# Patient Record
Sex: MALE | Race: WHITE | Employment: OTHER | URBAN - METROPOLITAN AREA
[De-identification: names, ages, dates, MRNs, and addresses within clinical notes are randomized per-mention and may not be internally consistent; named-entity substitution may affect disease eponyms.]

---

## 2017-11-16 ENCOUNTER — CASE MANAGEMENT (OUTPATIENT)
Dept: EMERGENCY DEPT | Age: 62
End: 2017-11-16

## 2017-11-16 PROBLEM — R79.89 ELEVATED LFTS: Status: ACTIVE | Noted: 2017-11-16

## 2017-11-16 PROBLEM — K62.89 PROCTITIS: Status: ACTIVE | Noted: 2017-11-16

## 2017-11-16 PROBLEM — K62.89 RECTAL MASS: Status: ACTIVE | Noted: 2017-11-16

## 2017-11-16 PROBLEM — E87.5 HYPERKALEMIA: Status: ACTIVE | Noted: 2017-11-16

## 2017-12-11 ENCOUNTER — OFFICE VISIT (OUTPATIENT)
Dept: FAMILY MEDICINE CLINIC | Age: 62
End: 2017-12-11

## 2017-12-11 VITALS
BODY MASS INDEX: 21.77 KG/M2 | DIASTOLIC BLOOD PRESSURE: 78 MMHG | SYSTOLIC BLOOD PRESSURE: 136 MMHG | HEIGHT: 69 IN | OXYGEN SATURATION: 95 % | HEART RATE: 66 BPM | TEMPERATURE: 98.1 F | WEIGHT: 147 LBS

## 2017-12-11 DIAGNOSIS — R74.8 ELEVATED LIVER ENZYMES: Primary | ICD-10-CM

## 2017-12-11 DIAGNOSIS — F10.10 ALCOHOL ABUSE: ICD-10-CM

## 2017-12-11 LAB
A/G RATIO: 0.8 (ref 1.1–2.2)
ALBUMIN SERPL-MCNC: 4 G/DL (ref 3.4–5)
ALP BLD-CCNC: 98 U/L (ref 40–129)
ALT SERPL-CCNC: 51 U/L (ref 10–40)
ANION GAP SERPL CALCULATED.3IONS-SCNC: 19 MMOL/L (ref 3–16)
AST SERPL-CCNC: 119 U/L (ref 15–37)
BASOPHILS ABSOLUTE: 0 K/UL (ref 0–0.2)
BASOPHILS RELATIVE PERCENT: 1.2 %
BILIRUB SERPL-MCNC: 1.8 MG/DL (ref 0–1)
BUN BLDV-MCNC: 11 MG/DL (ref 7–20)
CALCIUM SERPL-MCNC: 9.7 MG/DL (ref 8.3–10.6)
CHLORIDE BLD-SCNC: 99 MMOL/L (ref 99–110)
CO2: 22 MMOL/L (ref 21–32)
CREAT SERPL-MCNC: 0.6 MG/DL (ref 0.8–1.3)
EOSINOPHILS ABSOLUTE: 0 K/UL (ref 0–0.6)
EOSINOPHILS RELATIVE PERCENT: 0.9 %
GFR AFRICAN AMERICAN: >60
GFR NON-AFRICAN AMERICAN: >60
GLOBULIN: 5.2 G/DL
GLUCOSE BLD-MCNC: 96 MG/DL (ref 70–99)
HCT VFR BLD CALC: 38.7 % (ref 40.5–52.5)
HEMOGLOBIN: 13 G/DL (ref 13.5–17.5)
LYMPHOCYTES ABSOLUTE: 0.8 K/UL (ref 1–5.1)
LYMPHOCYTES RELATIVE PERCENT: 24.2 %
MCH RBC QN AUTO: 35.5 PG (ref 26–34)
MCHC RBC AUTO-ENTMCNC: 33.7 G/DL (ref 31–36)
MCV RBC AUTO: 105.3 FL (ref 80–100)
MONOCYTES ABSOLUTE: 0.4 K/UL (ref 0–1.3)
MONOCYTES RELATIVE PERCENT: 13 %
NEUTROPHILS ABSOLUTE: 2 K/UL (ref 1.7–7.7)
NEUTROPHILS RELATIVE PERCENT: 60.7 %
PDW BLD-RTO: 13.4 % (ref 12.4–15.4)
PLATELET # BLD: 137 K/UL (ref 135–450)
PMV BLD AUTO: 9.4 FL (ref 5–10.5)
POTASSIUM SERPL-SCNC: 3.8 MMOL/L (ref 3.5–5.1)
RBC # BLD: 3.68 M/UL (ref 4.2–5.9)
SODIUM BLD-SCNC: 140 MMOL/L (ref 136–145)
TOTAL PROTEIN: 9.2 G/DL (ref 6.4–8.2)
WBC # BLD: 3.3 K/UL (ref 4–11)

## 2017-12-11 PROCEDURE — G8598 ASA/ANTIPLAT THER USED: HCPCS | Performed by: NURSE PRACTITIONER

## 2017-12-11 PROCEDURE — G8420 CALC BMI NORM PARAMETERS: HCPCS | Performed by: NURSE PRACTITIONER

## 2017-12-11 PROCEDURE — 4004F PT TOBACCO SCREEN RCVD TLK: CPT | Performed by: NURSE PRACTITIONER

## 2017-12-11 PROCEDURE — G8484 FLU IMMUNIZE NO ADMIN: HCPCS | Performed by: NURSE PRACTITIONER

## 2017-12-11 PROCEDURE — G8427 DOCREV CUR MEDS BY ELIG CLIN: HCPCS | Performed by: NURSE PRACTITIONER

## 2017-12-11 PROCEDURE — 1111F DSCHRG MED/CURRENT MED MERGE: CPT | Performed by: NURSE PRACTITIONER

## 2017-12-11 PROCEDURE — 99204 OFFICE O/P NEW MOD 45 MIN: CPT | Performed by: NURSE PRACTITIONER

## 2017-12-11 PROCEDURE — 3017F COLORECTAL CA SCREEN DOC REV: CPT | Performed by: NURSE PRACTITIONER

## 2017-12-11 PROCEDURE — 36415 COLL VENOUS BLD VENIPUNCTURE: CPT | Performed by: NURSE PRACTITIONER

## 2017-12-11 ASSESSMENT — ENCOUNTER SYMPTOMS
BLOOD IN STOOL: 0
SHORTNESS OF BREATH: 0
DIARRHEA: 0
NAUSEA: 0
CONSTIPATION: 0
VOMITING: 0
WHEEZING: 0

## 2017-12-11 NOTE — PROGRESS NOTES
Subjective:      Patient ID: Smita Castellanos is a 58 y.o. male. HPI pt is here to establish care. Pt said the last beer was Thursday. Pt is an acholics. Pt said he wants to stop drinking. Pt was recently hospitalized for alcoholic. 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 Pt will go part time in January. Review of Systems   Respiratory: Negative for shortness of breath and wheezing. Gastrointestinal: Negative for blood in stool, constipation, diarrhea, nausea and vomiting. All other systems reviewed and are negative. Objective:   Physical Exam   Constitutional: He is oriented to person, place, and time. He appears well-developed and well-nourished. Eyes: Conjunctivae are normal. Pupils are equal, round, and reactive to light. Cardiovascular: Normal rate, regular rhythm and normal heart sounds. No murmur heard. Pulmonary/Chest: Effort normal and breath sounds normal. He has no wheezes. He has no rales. Abdominal: Bowel sounds are normal. There is no tenderness. There is no rebound and no guarding. Neurological: He is alert and oriented to person, place, and time. Tremor a resting state. Continues when holding object. Slight head tremor as well. Skin: Skin is warm and dry. Psychiatric: He has a normal mood and affect. His behavior is normal. Judgment and thought content normal.   Vitals reviewed. Assessment:      1. Elevated liver enzymes  [facility] Grace Maciel MD    COMPREHENSIVE METABOLIC PANEL    CBC Auto Differential   2. Alcohol abuse  [address], MD    COMPREHENSIVE METABOLIC PANEL    CBC Auto Differential           Plan:      Alyssia Michaud was seen today for establish care. Diagnoses and all orders for this visit:    Elevated liver enzymes - will check patient's CBC along with liver enzymes today.  Patient will make appointment with Dr. Magno Phelps MD  - COMPREHENSIVE METABOLIC PANEL  -     CBC Auto Differential    Alcohol abuse -had a long talk today with patient about not drinking. Patient educated about liver results from ultrasound. Patient will see Dr. Corry Travis for evaluation. 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 Talk to patient about AA as well as outpatient or inpatient rehabilitation. Patient is not interested at this time. -     Ul. Lindsay Bradley MD  -     COMPREHENSIVE METABOLIC PANEL  -     CBC Auto Differential          patient will follow-up in one month. Suspect patient will do better on some kind of antianxiety medication. Is almost patient is drinking will not refill Librium.

## 2017-12-13 ENCOUNTER — TELEPHONE (OUTPATIENT)
Dept: FAMILY MEDICINE CLINIC | Age: 62
End: 2017-12-13

## 2017-12-13 RX ORDER — CHLORDIAZEPOXIDE HYDROCHLORIDE 25 MG/1
25 CAPSULE, GELATIN COATED ORAL 3 TIMES DAILY PRN
Qty: 30 CAPSULE | Refills: 1 | OUTPATIENT
Start: 2017-12-13

## 2017-12-13 NOTE — TELEPHONE ENCOUNTER
Pt called back requesting refill on librium. States this was given to him at the hospital and he thought the refill was being sent the day of his appt, but was not.

## 2017-12-13 NOTE — TELEPHONE ENCOUNTER
Pt is calling back to talk to Benjy. Pt said she called him. Pt is available at [phone] today. Pt was here on 12/11/17 for ov.

## 2017-12-14 RX ORDER — BUSPIRONE HYDROCHLORIDE 7.5 MG/1
7.5 TABLET ORAL 2 TIMES DAILY
Qty: 60 TABLET | Refills: 0 | Status: SHIPPED | OUTPATIENT
Start: 2017-12-14 | End: 2021-04-16 | Stop reason: ALTCHOICE

## 2017-12-14 NOTE — TELEPHONE ENCOUNTER
Patient called back about his prescription, advised him per Catee it was denied because he is still drinking and it is lethal when is drinking. He stated \"no I haven't had a drink since Monday and I'm going to quit drinking. \" Asking for Catee or someone call him back.

## 2017-12-15 ENCOUNTER — TELEPHONE (OUTPATIENT)
Dept: FAMILY MEDICINE CLINIC | Age: 62
End: 2017-12-15

## 2017-12-27 ENCOUNTER — INITIAL CONSULT (OUTPATIENT)
Dept: GASTROENTEROLOGY | Age: 62
End: 2017-12-27

## 2017-12-27 VITALS
HEIGHT: 69 IN | SYSTOLIC BLOOD PRESSURE: 126 MMHG | BODY MASS INDEX: 21.92 KG/M2 | DIASTOLIC BLOOD PRESSURE: 80 MMHG | WEIGHT: 148 LBS

## 2017-12-27 DIAGNOSIS — R63.4 WEIGHT LOSS: ICD-10-CM

## 2017-12-27 DIAGNOSIS — D69.6 THROMBOCYTOPENIA (HCC): ICD-10-CM

## 2017-12-27 DIAGNOSIS — R79.89 ELEVATED LFTS: ICD-10-CM

## 2017-12-27 DIAGNOSIS — D62 ACUTE BLOOD LOSS ANEMIA: ICD-10-CM

## 2017-12-27 DIAGNOSIS — F10.10 ALCOHOL ABUSE: Primary | ICD-10-CM

## 2017-12-27 PROCEDURE — 99204 OFFICE O/P NEW MOD 45 MIN: CPT | Performed by: INTERNAL MEDICINE

## 2017-12-27 NOTE — PROGRESS NOTES
[address]  [facility]  Phone: [phone]    CHIEF COMPLAINT     Chief Complaint   Patient presents with   [facility].     NP- elevated LFT's, referred by Dr. Soumya NGO     Thank you Soheila Tripp CNP for asking me to see Yola Garcia in consultation. He is a  [2] White [1] 58 y.o. . male seen independently who presents with the following GI complaints:  . Puneet Rivera  Has a new diagnosis of alcoholic cirrhosis by Ct. He is noted to be slightly anemic. CT showed rectal thickening and colonoscopy with CGI showed some polyps. He has lost 15lbs or so in the past month. Annamarie Loge has been started in this same time and he has cut down on his drinking some. He drank most of his life. His father made moonshine. He talks about wanting to start Chantix in 6 months. There is a mild thrombocytopenia. CHADD, F actin, A1AT, iron studies noted. AST>ALT c/w alcoholic liver disease. Alcohol level tony 6 weeks ago. Last Encounter Reviewed:   Pertinent PMH, FH, SH is reviewed below.   Last EGD: none  Last Colonoscopy: 12/2017 polyps, NA    No components found for: HGBA1C  BP Readings from Last 3 Encounters:   12/27/17 126/80   12/11/17 136/78   11/17/17 (!) 153/92     Health Maintenance   Topic Date Due    HIV screen  04/30/1970    DTaP/Tdap/Td vaccine (1 - Tdap) 04/30/1974    Pneumococcal med risk (1 of 1 - PPSV23) 04/30/1974    Smoker: low dose lung CT screening  04/30/2010    Zostavax vaccine  04/30/2015    Flu vaccine (1) 09/01/2017    Colon Cancer Screen FIT/FOBT  11/16/2018    Lipid screen  04/05/2021    Hepatitis C screen  Completed       No components found for: [facility]     PAST MEDICAL HISTORY     Past Medical History:   Diagnosis Date    Hepatitis C antibody positive in blood 11/16/2017    MI (myocardial infarction)      FAMILY HISTORY     Family History   Problem Relation Age of Onset    Other Mother      SOCIAL HISTORY     Social History     Social History    Marital status:      Spouse name: N/A    Number of children: N/A    Years of education: N/A     Occupational History    Not on file. Social History Main Topics    Smoking status: Current Every Day Smoker     Packs/day: 1.00    Smokeless tobacco: Never Used    Alcohol use 3.0 oz/week     5 Cans of beer per week    Drug use: No    Sexual activity: Not on file     Other Topics Concern    Not on file     Social History Narrative    No narrative on file     SURGICAL HISTORY     Past Surgical History:   Procedure Laterality Date    CARDIAC CATHETERIZATION      COLONOSCOPY  11/17/2017    Initial Colonoscopy; Multiple Colon Polyps     CURRENT MEDICATIONS   (This list may include medications prescribed during this encounter as epic can not insert only the list prior to this encounter.)  Current Outpatient Rx   Medication Sig Dispense Refill    aspirin 81 MG tablet Take 81 mg by mouth daily      busPIRone (BUSPAR) 7.5 MG tablet Take 1 tablet by mouth 2 times daily 60 tablet 0    folic acid (FOLVITE) 1 MG tablet Take 1 tablet by mouth daily 30 tablet 3    vitamin B-1 100 MG tablet Take 1 tablet by mouth daily 30 tablet 0    famotidine (PEPCID) 20 MG tablet Take 1 tablet by mouth 2 times daily 60 tablet 0    Multiple Vitamins-Minerals (THERAPEUTIC MULTIVITAMIN-MINERALS) tablet Take 1 tablet by mouth daily 30 tablet 0     ALLERGIES     Allergies   Allergen Reactions    Codeine Hives     IMMUNIZATIONS     There is no immunization history on file for this patient. REVIEW OF SYSTEMS   See HPI for further details and pertinent postiives. Negative for the following:  Constitutional: Negative for weight change. Negative for appetite change and fatigue. HENT: Negative for nosebleeds, sore throat, mouth sores, and voice change. Respiratory: Negative for cough, choking and chest tightness.    Cardiovascular: Negative for chest 12/27/2018    Protime-INR     Standing Status:   Future     Standing Expiration Date:   12/27/2018     Order Specific Question:   Daily Coumadin Dose? Answer:   none    NV ESOPHAGOGASTRODUODENOSCOPY TRANSORAL DIAGNOSTIC     With diprivan     Puneet was seen today for establish care. Diagnoses and all orders for this visit:    Alcohol abuse  -     US Gallbladder Ruq; Future  -     Protime-INR; Future    Thrombocytopenia ([address]  -     US Gallbladder Ruq; Future  -     Protime-INR; Future    Elevated LFTs  -     AFP TUMOR MARKER; Future  -     US Gallbladder Ruq; Future  -     IgA; Future  -     Tissue Transglutaminase, IgA; Future  -     Protime-INR; Future    Acute blood loss anemia  -     NV ESOPHAGOGASTRODUODENOSCOPY TRANSORAL DIAGNOSTIC    Weight loss        ORDERED FUTURE/PENDING TESTS       FOLLOWUP   Return for EGD.           David LE 12/27/17 2:33 PM    CC:  Gaurang Washington, CNP

## 2017-12-29 ENCOUNTER — TELEPHONE (OUTPATIENT)
Dept: GASTROENTEROLOGY | Age: 62
End: 2017-12-29

## 2018-01-09 ENCOUNTER — TELEPHONE (OUTPATIENT)
Dept: GASTROENTEROLOGY | Age: 63
End: 2018-01-09

## 2018-01-09 NOTE — LETTER
[address] [facility]  [address] [phone]  f [phone]    Salomon Pena MD                        [facility] 18    2:14 PM    Facility:   [facility] ENDO                                                            Procedure Date & Time: [email or web address]       Pt arrival: 9:00AM                                                                                      Patient Name:  Fiona Hudson     :  1955 PCP:  Guillermina Soto CNP       Home Ph:    [phone] (home)           SSN:                                         PROCEDURE:  EGD                                                      [id number]    DIAGNOSIS:  Anemia                      D62    Anesthesia: _none_  Time Needed: 20 minutes  Pt Position:  lateral, right side up         Outpatient _X_                                    _x_PREP:  NPO 8 hours prior                           _____Cardiac Clearance by; ___________     Medications to be stopped 5 days before procedure: _________  Additional / Special Orders:                                                                                                                                                                                                                                 Puneet Rivera    1955                                                    Endoscopy Order   IN ACCORDANCE WITH OUR FORMULARY SYSTEM, A GENERIC EQUIVALENT DRUG MAY BE DISPNSED AND ADMINISTERED UNLESS D. A. W. IS WRITTEN WITH THE MEDICATION ORDER.   DATE HOUR PHYSICIAN:  RECORD DATE, HOUR AND SIGN EACH ENTRY   18 10:00Am 1)  Admit for:   Colonoscopy  EGD     Anesthesia/MAC        ERCP     Upper EUS     Lower EUS                             2)  Diagnosis: D62     3)  Establish IV access     Solution:  0.9 Normal Saline   Lactated Ringers    Other:                            Rate:   Pamela Abreu      Other:

## 2018-01-17 ENCOUNTER — TELEPHONE (OUTPATIENT)
Dept: GASTROENTEROLOGY | Age: 63
End: 2018-01-17

## 2018-01-18 ENCOUNTER — HOSPITAL ENCOUNTER (OUTPATIENT)
Dept: ULTRASOUND IMAGING | Age: 63
Discharge: HOME | End: 2018-01-18
Attending: INTERNAL MEDICINE | Admitting: INTERNAL MEDICINE

## 2018-01-18 ENCOUNTER — HOSPITAL ENCOUNTER (OUTPATIENT)
Dept: SURGERY | Age: 63
Discharge: HOME | End: 2018-01-18
Attending: INTERNAL MEDICINE | Admitting: INTERNAL MEDICINE

## 2018-01-18 VITALS
OXYGEN SATURATION: 96 % | HEART RATE: 65 BPM | DIASTOLIC BLOOD PRESSURE: 78 MMHG | RESPIRATION RATE: 18 BRPM | SYSTOLIC BLOOD PRESSURE: 115 MMHG | TEMPERATURE: 98.5 F

## 2018-01-18 DIAGNOSIS — D69.6 THROMBOCYTOPENIA (HCC): ICD-10-CM

## 2018-01-18 DIAGNOSIS — F10.10 UNCOMPLICATED ALCOHOL ABUSE: ICD-10-CM

## 2018-01-18 DIAGNOSIS — R79.89 ELEVATED LFTS: ICD-10-CM

## 2018-01-18 DIAGNOSIS — F10.10 ALCOHOL ABUSE: ICD-10-CM

## 2018-01-18 LAB
IGA: 273 MG/DL (ref 70–400)
INR BLD: 1.16 (ref 0.85–1.15)
PROTHROMBIN TIME: 13.1 SEC (ref 9.6–13)

## 2018-01-18 RX ORDER — HYDROMORPHONE HCL 110MG/55ML
0.5 PATIENT CONTROLLED ANALGESIA SYRINGE INTRAVENOUS EVERY 5 MIN PRN
Status: DISCONTINUED | OUTPATIENT
Start: 2018-01-18 | End: 2018-01-19 | Stop reason: HOSPADM

## 2018-01-18 RX ORDER — HYDROMORPHONE HCL 110MG/55ML
0.25 PATIENT CONTROLLED ANALGESIA SYRINGE INTRAVENOUS EVERY 5 MIN PRN
Status: DISCONTINUED | OUTPATIENT
Start: 2018-01-18 | End: 2018-01-19 | Stop reason: HOSPADM

## 2018-01-18 RX ORDER — SODIUM CHLORIDE, SODIUM LACTATE, POTASSIUM CHLORIDE, CALCIUM CHLORIDE 600; 310; 30; 20 MG/100ML; MG/100ML; MG/100ML; MG/100ML
INJECTION, SOLUTION INTRAVENOUS ONCE
Status: COMPLETED | OUTPATIENT
Start: 2018-01-18 | End: 2018-01-18

## 2018-01-18 RX ORDER — ONDANSETRON 2 MG/ML
4 INJECTION INTRAMUSCULAR; INTRAVENOUS PRN
Status: DISCONTINUED | OUTPATIENT
Start: 2018-01-18 | End: 2018-01-19 | Stop reason: HOSPADM

## 2018-01-18 RX ORDER — OXYCODONE HYDROCHLORIDE AND ACETAMINOPHEN 5; 325 MG/1; MG/1
2 TABLET ORAL PRN
Status: ACTIVE | OUTPATIENT
Start: 2018-01-18 | End: 2018-01-18

## 2018-01-18 RX ORDER — LABETALOL HYDROCHLORIDE 5 MG/ML
5 INJECTION, SOLUTION INTRAVENOUS EVERY 10 MIN PRN
Status: DISCONTINUED | OUTPATIENT
Start: 2018-01-18 | End: 2018-01-19 | Stop reason: HOSPADM

## 2018-01-18 RX ORDER — MORPHINE SULFATE 4 MG/ML
1 INJECTION, SOLUTION INTRAMUSCULAR; INTRAVENOUS EVERY 5 MIN PRN
Status: DISCONTINUED | OUTPATIENT
Start: 2018-01-18 | End: 2018-01-19 | Stop reason: HOSPADM

## 2018-01-18 RX ORDER — DIPHENHYDRAMINE HYDROCHLORIDE 50 MG/ML
12.5 INJECTION INTRAMUSCULAR; INTRAVENOUS
Status: ACTIVE | OUTPATIENT
Start: 2018-01-18 | End: 2018-01-18

## 2018-01-18 RX ORDER — OXYCODONE HYDROCHLORIDE AND ACETAMINOPHEN 5; 325 MG/1; MG/1
1 TABLET ORAL PRN
Status: ACTIVE | OUTPATIENT
Start: 2018-01-18 | End: 2018-01-18

## 2018-01-18 RX ORDER — MORPHINE SULFATE 10 MG/ML
2 INJECTION, SOLUTION INTRAMUSCULAR; INTRAVENOUS EVERY 5 MIN PRN
Status: DISCONTINUED | OUTPATIENT
Start: 2018-01-18 | End: 2018-01-19 | Stop reason: HOSPADM

## 2018-01-18 RX ORDER — PROMETHAZINE HYDROCHLORIDE 25 MG/ML
6.25 INJECTION, SOLUTION INTRAMUSCULAR; INTRAVENOUS
Status: DISCONTINUED | OUTPATIENT
Start: 2018-01-18 | End: 2018-01-19 | Stop reason: HOSPADM

## 2018-01-18 RX ORDER — MEPERIDINE HYDROCHLORIDE 25 MG/ML
12.5 INJECTION INTRAMUSCULAR; INTRAVENOUS; SUBCUTANEOUS EVERY 5 MIN PRN
Status: DISCONTINUED | OUTPATIENT
Start: 2018-01-18 | End: 2018-01-19 | Stop reason: HOSPADM

## 2018-01-18 RX ORDER — HYDRALAZINE HYDROCHLORIDE 20 MG/ML
5 INJECTION INTRAMUSCULAR; INTRAVENOUS EVERY 10 MIN PRN
Status: DISCONTINUED | OUTPATIENT
Start: 2018-01-18 | End: 2018-01-19 | Stop reason: HOSPADM

## 2018-01-18 RX ADMIN — SODIUM CHLORIDE, SODIUM LACTATE, POTASSIUM CHLORIDE, CALCIUM CHLORIDE: 600; 310; 30; 20 INJECTION, SOLUTION INTRAVENOUS at 10:11

## 2018-01-18 NOTE — ANESTHESIA PRE-OP
Department of Anesthesiology  Preprocedure Note       Name:  Gonzalo Kat   Age:  58 y.o.  :  1955                                          MRN:  [id number]         Date:  2018      Surgeon:    Procedure:    Medications prior to admission:   Prior to Admission medications    Medication Sig Start Date End Date Taking? Authorizing Provider   aspirin 81 MG tablet Take 81 mg by mouth daily    Historical Provider, MD   busPIRone (BUSPAR) 7.5 MG tablet Take 1 tablet by mouth 2 times daily 17   Bunny Seat, CNP   folic acid (FOLVITE) 1 MG tablet Take 1 tablet by mouth daily 17   Whit Betancourt MD   vitamin B-1 100 MG tablet Take 1 tablet by mouth daily 17   Whit Betancourt MD   famotidine (PEPCID) 20 MG tablet Take 1 tablet by mouth 2 times daily 17   Whit Betancourt MD   Multiple Vitamins-Minerals (THERAPEUTIC MULTIVITAMIN-MINERALS) tablet Take 1 tablet by mouth daily 17  Whit Betancourt MD       Current medications:    Current Outpatient Prescriptions   Medication Sig Dispense Refill    aspirin 81 MG tablet Take 81 mg by mouth daily      busPIRone (BUSPAR) 7.5 MG tablet Take 1 tablet by mouth 2 times daily 60 tablet 0    folic acid (FOLVITE) 1 MG tablet Take 1 tablet by mouth daily 30 tablet 3    vitamin B-1 100 MG tablet Take 1 tablet by mouth daily 30 tablet 0    famotidine (PEPCID) 20 MG tablet Take 1 tablet by mouth 2 times daily 60 tablet 0    Multiple Vitamins-Minerals (THERAPEUTIC MULTIVITAMIN-MINERALS) tablet Take 1 tablet by mouth daily 30 tablet 0     No current facility-administered medications for this encounter. Allergies:     Allergies   Allergen Reactions    Codeine Hives       Problem List:    Patient Active Problem List   Diagnosis Code    Open wound of finger with complication G88.363O    Chronic dermatitis L30.9    Chest pain R07.9    Unstable angina (HCC) I20.0    Angina pectoris (HCC) I20.9    Alcohol abuse F10.10    Negative Endo/Other ROS                    Abdominal:           Vascular:                                      Anesthesia Plan      general     ASA 3     (I discussed with the patient the risks and benefits of PIV, general anesthesia, IV Narcotics, PACU. All questions were answered the patient agrees with the plan.)  Induction: intravenous. Cath 4/5/2016  LM/LAD/Cx: <20%  RCA 20-30%  LVEF 55-60%    Pre-Operative Diagnosis: ANEMIA    58 y.o.   BMI:  There is no height or weight on file to calculate BMI. Vitals:    01/18/18 0937   BP: (!) 156/99   Pulse: 73   Resp: 18   Temp: 98.5 °F (36.9 °C)   TempSrc: Oral   SpO2: 98%       Allergies   Allergen Reactions    Codeine Hives       Social History   Substance Use Topics    Smoking status: Current Every Day Smoker     Packs/day: 1.00     Years: 45.00    Smokeless tobacco: Never Used      Comment: refused, is seeing primary physician.      Alcohol use 3.0 oz/week     5 Cans of beer per week      Comment: 2 beers a day       LABS:    CBC  Lab Results   Component Value Date/Time    WBC 3.3 (L) 12/11/2017 11:54 AM    HGB 13.0 (L) 12/11/2017 11:54 AM    HCT 38.7 (L) 12/11/2017 11:54 AM     12/11/2017 11:54 AM     RENAL  Lab Results   Component Value Date/Time     12/11/2017 11:54 AM    K 3.8 12/11/2017 11:54 AM    CL 99 12/11/2017 11:54 AM    CO2 22 12/11/2017 11:54 AM    BUN 11 12/11/2017 11:54 AM    CREATININE 0.6 (L) 12/11/2017 11:54 AM    GLUCOSE 96 12/11/2017 11:54 AM     COAGS  Lab Results   Component Value Date/Time    PROTIME 13.1 (H) 01/18/2018 08:36 AM    INR 1.16 (H) 01/18/2018 08:36 AM    APTT 32.8 04/05/2016 10:30 AM       Katelin Ken MD   1/18/2018

## 2018-01-18 NOTE — PROGRESS NOTES
Discharge        2.  Weight & Height Noted ( PRN)        3.   Assess Baseline SPo2 (PRN)         4.  Monitor SAO2 Continuously During Sedation/ Analgesia & Until Patient Meets D/C Criteria.        5.  Resuscitation Equipment Available (PRN)    GASTROINTESTINAL  INTERDISCIPLINARY    Goal: Bowel Sounds Maintained   Interventions        1.  Evaluate Baseline Bowel Sounds, (PRN), & Prior to [address]       2.  Monitor Abdominal status of Patient Throughout Procedure       KNOWLEDGE DEFICIT, EDUCATION, DISCHARGE PLAN    INTERDISCIPLINARY    Patient / SO verbalize Understanding Of Procedural discharge Instructions  Interventions        1.   Obtain Informed Consent (PRN)         2.   Initiate ENDO Plan of Care, Answer Questions (PRN)         3.  Assess Patients Ability to Understand Information (PRN)        3.   Discharge Planning ; Assure Presence of  upon Patient [address]       4.   Education / Communication Ongoing As Appropriate        5.  Keep Families Aware of Delays As Appropriate        6.  Reinforce Discharge Teaching / Post Procedure Instructions (PRN)    PAIN MANAGEMENT  INTERDISCIPLINARY    Goal:Patient Return to Pre Procedure Comfort  Interventions        1.   Assess Baseline Pain Level (PRN)        2.   Intra Procedure ; Evaluation & Assessment Of Pain is Ongoing        3.  Post procedure; Assess Pain Level Once Awake/ Prior To Discharge        2.   Administer Analgesics as Ordered (PRN)         3.   Assess Effectiveness of Pain Management (PRN)           Re-Assess Patient after all Interventions.  Assess Pain Level 30 - 60 Minutes After Pain Management Intervention.        4.  Provide Discharge Teaching

## 2018-01-19 LAB — TISSUE TRANSGLUTAMINASE IGA: 1 U/ML (ref 0–3)

## 2018-01-20 LAB — AFP: 3.6 UG/L

## 2018-07-09 PROBLEM — R53.1 RIGHT SIDED WEAKNESS: Status: ACTIVE | Noted: 2018-07-09

## 2018-07-10 ENCOUNTER — TELEPHONE (OUTPATIENT)
Dept: ORTHOPEDIC SURGERY | Age: 63
End: 2018-07-10

## 2018-07-16 ENCOUNTER — OFFICE VISIT (OUTPATIENT)
Dept: ORTHOPEDIC SURGERY | Age: 63
End: 2018-07-16

## 2018-07-16 VITALS
SYSTOLIC BLOOD PRESSURE: 132 MMHG | HEART RATE: 66 BPM | DIASTOLIC BLOOD PRESSURE: 76 MMHG | BODY MASS INDEX: 22.96 KG/M2 | WEIGHT: 155 LBS | HEIGHT: 69 IN

## 2018-07-16 DIAGNOSIS — M50.00 HNP (HERNIATED NUCLEUS PULPOSUS) WITH MYELOPATHY, CERVICAL: Primary | ICD-10-CM

## 2018-07-16 PROCEDURE — 1111F DSCHRG MED/CURRENT MED MERGE: CPT | Performed by: PHYSICIAN ASSISTANT

## 2018-07-16 PROCEDURE — 99213 OFFICE O/P EST LOW 20 MIN: CPT | Performed by: PHYSICIAN ASSISTANT

## 2018-07-16 PROCEDURE — G8427 DOCREV CUR MEDS BY ELIG CLIN: HCPCS | Performed by: PHYSICIAN ASSISTANT

## 2018-07-16 PROCEDURE — 3017F COLORECTAL CA SCREEN DOC REV: CPT | Performed by: PHYSICIAN ASSISTANT

## 2018-07-16 PROCEDURE — G8598 ASA/ANTIPLAT THER USED: HCPCS | Performed by: PHYSICIAN ASSISTANT

## 2018-07-16 PROCEDURE — G8420 CALC BMI NORM PARAMETERS: HCPCS | Performed by: PHYSICIAN ASSISTANT

## 2018-07-16 PROCEDURE — 4004F PT TOBACCO SCREEN RCVD TLK: CPT | Performed by: PHYSICIAN ASSISTANT

## 2018-07-16 RX ORDER — TRAMADOL HYDROCHLORIDE 50 MG/1
50 TABLET ORAL EVERY 8 HOURS PRN
Qty: 30 TABLET | Refills: 0 | Status: SHIPPED | OUTPATIENT
Start: 2018-07-16 | End: 2018-07-26

## 2018-07-24 ENCOUNTER — OFFICE VISIT (OUTPATIENT)
Dept: ORTHOPEDIC SURGERY | Age: 63
End: 2018-07-24

## 2018-07-24 VITALS
BODY MASS INDEX: 30.43 KG/M2 | WEIGHT: 155 LBS | DIASTOLIC BLOOD PRESSURE: 58 MMHG | HEIGHT: 60 IN | HEART RATE: 89 BPM | SYSTOLIC BLOOD PRESSURE: 108 MMHG

## 2018-07-24 DIAGNOSIS — M50.00 HNP (HERNIATED NUCLEUS PULPOSUS) WITH MYELOPATHY, CERVICAL: Primary | ICD-10-CM

## 2018-07-24 DIAGNOSIS — M50.30 DDD (DEGENERATIVE DISC DISEASE), CERVICAL: ICD-10-CM

## 2018-07-24 DIAGNOSIS — M54.12 CERVICAL RADICULITIS: ICD-10-CM

## 2018-07-24 PROCEDURE — 1111F DSCHRG MED/CURRENT MED MERGE: CPT | Performed by: PHYSICIAN ASSISTANT

## 2018-07-24 PROCEDURE — G8417 CALC BMI ABV UP PARAM F/U: HCPCS | Performed by: PHYSICIAN ASSISTANT

## 2018-07-24 PROCEDURE — G8427 DOCREV CUR MEDS BY ELIG CLIN: HCPCS | Performed by: PHYSICIAN ASSISTANT

## 2018-07-24 PROCEDURE — 99213 OFFICE O/P EST LOW 20 MIN: CPT | Performed by: PHYSICIAN ASSISTANT

## 2018-07-24 PROCEDURE — 4004F PT TOBACCO SCREEN RCVD TLK: CPT | Performed by: PHYSICIAN ASSISTANT

## 2018-07-24 PROCEDURE — G8598 ASA/ANTIPLAT THER USED: HCPCS | Performed by: PHYSICIAN ASSISTANT

## 2018-07-24 PROCEDURE — 3017F COLORECTAL CA SCREEN DOC REV: CPT | Performed by: PHYSICIAN ASSISTANT

## 2018-07-24 RX ORDER — GABAPENTIN 100 MG/1
CAPSULE ORAL
Qty: 60 CAPSULE | Refills: 0 | Status: SHIPPED | OUTPATIENT
Start: 2018-07-24 | End: 2021-04-16 | Stop reason: ALTCHOICE

## 2018-07-24 NOTE — LETTER
10. **PLEASE BRING A LIST OF YOUR CURRENT MEDICATIONS TO YOUR PROCEDURE**    Insurance Information:    Our office will contact your insurance company to complete any prior authorization notification that    needs to be completed prior to your procedure. Please make sure we are notified of any insurance changes prior to your procedure. If you have any questions, please feel free to contact me at [phone]. [phone]  Thank you,     Steve Prakash LPN   to Dr. Kira Last    **[address] WITH [address] AT [address] ON [address] . [facility]  [address]  [address] [facility], [place]ARU[address]Leanne Meol [address]  [phone] [address]                     ________________________________________________________________________________________      [address] [place]      1. Admit to preop. 2. Start IV 1000 ml LR at [place] or _____ml/hr for planned conscious sedation     3. May inject 1 % Lidocaine 0.1 ml Intradermal to numb IV site     4. Protime/INR if patient is on Coumadin     5. Urine Pregnancy Test (females only) - 12 -50 years     6. Accu Check Glucose if diabetic. Notify physician if <80 or >250.      7. Sedate all neurotomies        ________________________________________________________________________________________      POST-OPERATIVE ORDERS - DR. MOURA      1. Admit to Post Op Phase 2     2. Implement Standards of Care for Phase 2 Post Op     3. Check Site - May discharge when site is free of bleeding     4. Discharge to home after meets Phase 2 criteria     5. Discharge cervical patients after 30 minutes and when meets Phase 2 criteria. 6. Give discharge instruction sheet     7.  For Diabetic patient, if blood sugar less than 80 in preop, Recheck blood sugar in Post Op. 8. Discontinue IV     9.  For Nausea may give Zofran 4 MG IV/IM/ODT                 ________________________________________________________________________________________        7/24/18 2:38 PM

## 2018-07-24 NOTE — PROGRESS NOTES
New Patient: SPINE    CHIEF COMPLAINT:    Chief Complaint   Patient presents with    Neck Pain       HISTORY OF PRESENT ILLNESS:                The patient is a 61 y.o. male history of CAD, elevated LFTs, alcohol abuse per Epic referred by Dr. Xiang Frey for [facility]. He reports a 3 week history of atraumatic constant aching right-sided neck/trap and scapular pain. At times symptoms radiate to the right deltoid. His neck pain is most bothersome. He is unable to describe any true exacerbating or palliative factors. At times reports right arm weakness. He was hospitalized in July and followed up with Dr. Emma Elizondo office whom has recommended KATHERINE. Other conservative management includes NSAIDs, tramadol, baclofen, MDP. No improvement at this time. He denies any distal radiating pain, no progressive numbness tingling or progressive weakness. No fine motor difficulty or gait instability. He states the pain medication he has been receiving is \"nothing but a piece of shit\"       Pain Assessment  Location of Pain: Neck  Severity of Pain: 10  Quality of Pain: Aching, Dull, Sharp  Duration of Pain: Persistent  Frequency of Pain: Constant  Aggravating Factors: Stairs, Walking, Standing, Squatting, Kneeling, Exercise, Straightening, Stretching, Bending  Limiting Behavior: Yes  Relieving Factors: Rest  Result of Injury: No  Work-Related Injury: No  Are there other pain locations you wish to document?: No    The pain assessment was noted & reviewed in the medical record today.      Current/Past Treatment:   · Physical Therapy: YES in-patient at [facility] [place]  · Chiropractic: no  · Injection: no  · Medications:  NSAIDs, MDP, baclofen, tramadol    · Surgery/Consult: Dr. Chacon Speaker rec: Geovanna Nunez    Past Medical History: Medical history form was reviewed today & scanned into the media tab  Past Medical History:   Diagnosis Date    Hepatitis C antibody positive in blood 11/16/2017    MI (myocardial infarction)       Past Surgical History:

## 2018-07-24 NOTE — LETTER
  folic acid (FOLVITE) 1 MG tablet, Take 1 tablet by mouth daily, Disp: 30 tablet, Rfl: 3    vitamin B-1 100 MG tablet, Take 1 tablet by mouth daily, Disp: 30 tablet, Rfl: 0    famotidine (PEPCID) 20 MG tablet, Take 1 tablet by mouth 2 times daily, Disp: 60 tablet, Rfl: 0    Multiple Vitamins-Minerals (THERAPEUTIC MULTIVITAMIN-MINERALS) tablet, Take 1 tablet by mouth daily, Disp: 30 tablet, Rfl: 0

## 2018-07-30 ENCOUNTER — TELEPHONE (OUTPATIENT)
Dept: ORTHOPEDIC SURGERY | Age: 63
End: 2018-07-30

## 2018-08-01 ENCOUNTER — TELEPHONE (OUTPATIENT)
Dept: ORTHOPEDIC SURGERY | Age: 63
End: 2018-08-01

## 2018-08-02 ENCOUNTER — TELEPHONE (OUTPATIENT)
Dept: ORTHOPEDIC SURGERY | Age: 63
End: 2018-08-02

## 2018-08-07 ENCOUNTER — TELEPHONE (OUTPATIENT)
Dept: ORTHOPEDIC SURGERY | Age: 63
End: 2018-08-07

## 2018-08-08 ENCOUNTER — TELEPHONE (OUTPATIENT)
Dept: ORTHOPEDIC SURGERY | Age: 63
End: 2018-08-08

## 2018-08-13 ENCOUNTER — HOSPITAL ENCOUNTER (OUTPATIENT)
Age: 63
Setting detail: OUTPATIENT SURGERY
Discharge: HOME | End: 2018-08-13
Attending: PHYSICAL MEDICINE & REHABILITATION | Admitting: PHYSICAL MEDICINE & REHABILITATION
Payer: COMMERCIAL

## 2018-08-13 VITALS
TEMPERATURE: 97.8 F | SYSTOLIC BLOOD PRESSURE: 145 MMHG | DIASTOLIC BLOOD PRESSURE: 91 MMHG | HEART RATE: 69 BPM | RESPIRATION RATE: 16 BRPM | OXYGEN SATURATION: 97 %

## 2018-08-13 PROCEDURE — 2500000003 HC RX 250 WO HCPCS: Performed by: PHYSICAL MEDICINE & REHABILITATION

## 2018-08-13 PROCEDURE — 7100000010 HC PHASE II RECOVERY - FIRST 15 MIN: Performed by: PHYSICAL MEDICINE & REHABILITATION

## 2018-08-13 PROCEDURE — 3600000012 HC SURGERY LEVEL 2 ADDTL 15MIN: Performed by: PHYSICAL MEDICINE & REHABILITATION

## 2018-08-13 PROCEDURE — 99152 MOD SED SAME PHYS/QHP 5/>YRS: CPT | Performed by: PHYSICAL MEDICINE & REHABILITATION

## 2018-08-13 PROCEDURE — 2580000003 HC RX 258: Performed by: PHYSICAL MEDICINE & REHABILITATION

## 2018-08-13 PROCEDURE — 3600000002 HC SURGERY LEVEL 2 BASE: Performed by: PHYSICAL MEDICINE & REHABILITATION

## 2018-08-13 PROCEDURE — 2709999900 HC NON-CHARGEABLE SUPPLY: Performed by: PHYSICAL MEDICINE & REHABILITATION

## 2018-08-13 PROCEDURE — 7100000011 HC PHASE II RECOVERY - ADDTL 15 MIN: Performed by: PHYSICAL MEDICINE & REHABILITATION

## 2018-08-13 PROCEDURE — 6360000002 HC RX W HCPCS: Performed by: PHYSICAL MEDICINE & REHABILITATION

## 2018-08-13 RX ORDER — 0.9 % SODIUM CHLORIDE 0.9 %
VIAL (ML) INJECTION PRN
Status: DISCONTINUED | OUTPATIENT
Start: 2018-08-13 | End: 2018-08-13 | Stop reason: HOSPADM

## 2018-08-13 RX ORDER — SODIUM CHLORIDE, SODIUM LACTATE, POTASSIUM CHLORIDE, CALCIUM CHLORIDE 600; 310; 30; 20 MG/100ML; MG/100ML; MG/100ML; MG/100ML
INJECTION, SOLUTION INTRAVENOUS CONTINUOUS
Status: DISCONTINUED | OUTPATIENT
Start: 2018-08-13 | End: 2018-08-13 | Stop reason: HOSPADM

## 2018-08-13 RX ORDER — DEXAMETHASONE SODIUM PHOSPHATE 10 MG/ML
INJECTION, SOLUTION INTRAMUSCULAR; INTRAVENOUS PRN
Status: DISCONTINUED | OUTPATIENT
Start: 2018-08-13 | End: 2018-08-13 | Stop reason: HOSPADM

## 2018-08-13 RX ORDER — MIDAZOLAM HYDROCHLORIDE 5 MG/ML
INJECTION INTRAMUSCULAR; INTRAVENOUS PRN
Status: DISCONTINUED | OUTPATIENT
Start: 2018-08-13 | End: 2018-08-13 | Stop reason: HOSPADM

## 2018-08-13 RX ORDER — FENTANYL CITRATE 50 UG/ML
INJECTION, SOLUTION INTRAMUSCULAR; INTRAVENOUS PRN
Status: DISCONTINUED | OUTPATIENT
Start: 2018-08-13 | End: 2018-08-13 | Stop reason: HOSPADM

## 2018-08-13 RX ORDER — LIDOCAINE HYDROCHLORIDE 10 MG/ML
INJECTION, SOLUTION EPIDURAL; INFILTRATION; INTRACAUDAL; PERINEURAL PRN
Status: DISCONTINUED | OUTPATIENT
Start: 2018-08-13 | End: 2018-08-13 | Stop reason: HOSPADM

## 2018-08-13 RX ORDER — MIDAZOLAM HYDROCHLORIDE 1 MG/ML
INJECTION INTRAMUSCULAR; INTRAVENOUS
Status: DISCONTINUED
Start: 2018-08-13 | End: 2018-08-13 | Stop reason: HOSPADM

## 2018-08-13 RX ADMIN — SODIUM CHLORIDE, POTASSIUM CHLORIDE, SODIUM LACTATE AND CALCIUM CHLORIDE: 600; 310; 30; 20 INJECTION, SOLUTION INTRAVENOUS at 09:56

## 2018-08-13 RX ADMIN — LIDOCAINE HYDROCHLORIDE 0.1 ML: 10 INJECTION, SOLUTION EPIDURAL; INFILTRATION; INTRACAUDAL; PERINEURAL at 09:56

## 2018-08-13 ASSESSMENT — PAIN - FUNCTIONAL ASSESSMENT: PAIN_FUNCTIONAL_ASSESSMENT: 0-10

## 2018-08-13 ASSESSMENT — PAIN DESCRIPTION - DESCRIPTORS: DESCRIPTORS: ACHING;THROBBING;CONSTANT

## 2018-08-13 NOTE — POST SEDATION
Sedation Post Procedure Note    Patient Name: Justo Prasad   YOB: 1955  Room/Bed: EG OR/NONE  Medical Record Number: [id number]  Date: 2018   Time: 10:16 AM         POST SEDATION ASSESSMENT      Patient:  Deangelo Leigh   :  1955  Medical Record No.:  [id number]   Date:  2018  Physician:  Nuvia Kelsey M.D.       Patient location: Recovery  Level of consciousness: Awake, Alert, Oriented  Pain Control: Good  Respiration: Adequate  Post-op assessment: No sedation complications    Last Vitals:   Vitals:    18 0958   BP: (!) 148/97   Pulse: 93   Resp: 20   Temp:    SpO2: 95%     Post-op Vitals: Stable    F Claude Perez  10:16 AM

## 2018-08-13 NOTE — H&P
HISTORY AND PHYSICAL/PRE-SEDATION ASSESSMENT    Patient:  Miriam Pascal   :  1955  Medical Record No.:  [id number]   Date:  2018  Physician:  Grace Ojeda M.D. Facility: [facility]     Nursing History and Physical reviewed and agreed upon. Additional findings:    Allergies:  Codeine    Home Medications:    Prior to Admission medications    Medication Sig Start Date End Date Taking? Authorizing Provider   gabapentin (NEURONTIN) 100 MG capsule i po BID & QHS. 18  Elin Connelly PA-C   baclofen (LIORESAL) 10 MG tablet Take 1 tablet by mouth 3 times daily 7/10/18   AMY Deleon CNP   ChlordiazePOXIDE HCl (LIBRIUM PO) Take by mouth    Historical Provider, MD   aspirin 81 MG tablet Take 81 mg by mouth daily Every other day    Historical Provider, MD   busPIRone (BUSPAR) 7.5 MG tablet Take 1 tablet by mouth 2 times daily  Patient taking differently: Take 7.5 mg by mouth daily as needed  17   AMY Wen CNP   folic acid (FOLVITE) 1 MG tablet Take 1 tablet by mouth daily 17   Brigette Esqueda MD   vitamin B-1 100 MG tablet Take 1 tablet by mouth daily 17   Brigette Esqueda MD   famotidine (PEPCID) 20 MG tablet Take 1 tablet by mouth 2 times daily 17   Brigette Esqueda MD   Multiple Vitamins-Minerals (THERAPEUTIC MULTIVITAMIN-MINERALS) tablet Take 1 tablet by mouth daily 17  Brigette Esqueda MD       Vitals: Stable       PHYSICAL EXAM:  HENT: Airway patent and reviewed  Cardiovascular: Normal rate, regular rhythm, normal heart sounds. Pulmonary/Chest: No wheezes. No rhonchi. No rales. Abdominal: Soft. Bowel sounds are normal. No distension. ASA CLASS:         []   I. Normal, healthy adult           [x]   II.  Mild systemic disease            []   III.   Severe systemic disease      Sedation plan:   [x]  Local              [x]  Minimal                  []  General

## 2019-01-31 ENCOUNTER — TELEPHONE (OUTPATIENT)
Dept: GASTROENTEROLOGY | Age: 64
End: 2019-01-31

## 2020-11-03 ENCOUNTER — HOSPITAL ENCOUNTER (OUTPATIENT)
Age: 65
Discharge: HOME | End: 2020-11-03
Payer: MEDICARE

## 2020-11-03 LAB
A/G RATIO: 0.9 (ref 1.1–2.2)
ALBUMIN SERPL-MCNC: 4.2 G/DL (ref 3.4–5)
ALP BLD-CCNC: 139 U/L (ref 40–129)
ALT SERPL-CCNC: 73 U/L (ref 10–40)
AMMONIA: 26 UMOL/L (ref 16–60)
ANION GAP SERPL CALCULATED.3IONS-SCNC: 13 MMOL/L (ref 3–16)
AST SERPL-CCNC: 92 U/L (ref 15–37)
BASOPHILS ABSOLUTE: 0 K/UL (ref 0–0.2)
BASOPHILS RELATIVE PERCENT: 0.5 %
BILIRUB SERPL-MCNC: 2.4 MG/DL (ref 0–1)
BUN BLDV-MCNC: 15 MG/DL (ref 7–20)
CALCIUM SERPL-MCNC: 10 MG/DL (ref 8.3–10.6)
CHLORIDE BLD-SCNC: 103 MMOL/L (ref 99–110)
CO2: 21 MMOL/L (ref 21–32)
CREAT SERPL-MCNC: 0.5 MG/DL (ref 0.8–1.3)
EOSINOPHILS ABSOLUTE: 0 K/UL (ref 0–0.6)
EOSINOPHILS RELATIVE PERCENT: 0.6 %
GFR AFRICAN AMERICAN: >60
GFR NON-AFRICAN AMERICAN: >60
GLOBULIN: 4.6 G/DL
GLUCOSE BLD-MCNC: 198 MG/DL (ref 70–99)
HCT VFR BLD CALC: 32.1 % (ref 40.5–52.5)
HEMOGLOBIN: 9.8 G/DL (ref 13.5–17.5)
INR BLD: 1.38 (ref 0.86–1.14)
IRON SATURATION: 8 % (ref 20–50)
IRON: 43 UG/DL (ref 59–158)
LYMPHOCYTES ABSOLUTE: 0.6 K/UL (ref 1–5.1)
LYMPHOCYTES RELATIVE PERCENT: 19.9 %
MCH RBC QN AUTO: 24.4 PG (ref 26–34)
MCHC RBC AUTO-ENTMCNC: 30.5 G/DL (ref 31–36)
MCV RBC AUTO: 79.9 FL (ref 80–100)
MONOCYTES ABSOLUTE: 0.3 K/UL (ref 0–1.3)
MONOCYTES RELATIVE PERCENT: 10.1 %
NEUTROPHILS ABSOLUTE: 2.1 K/UL (ref 1.7–7.7)
NEUTROPHILS RELATIVE PERCENT: 68.9 %
PDW BLD-RTO: 20.2 % (ref 12.4–15.4)
PLATELET # BLD: 117 K/UL (ref 135–450)
PMV BLD AUTO: 8.9 FL (ref 5–10.5)
POTASSIUM SERPL-SCNC: 4 MMOL/L (ref 3.5–5.1)
PROTHROMBIN TIME: 16.1 SEC (ref 10–13.2)
RBC # BLD: 4.02 M/UL (ref 4.2–5.9)
SODIUM BLD-SCNC: 137 MMOL/L (ref 136–145)
TOTAL IRON BINDING CAPACITY: 544 UG/DL (ref 260–445)
TOTAL PROTEIN: 8.8 G/DL (ref 6.4–8.2)
WBC # BLD: 3 K/UL (ref 4–11)

## 2020-11-03 PROCEDURE — 83550 IRON BINDING TEST: CPT

## 2020-11-03 PROCEDURE — 85025 COMPLETE CBC W/AUTO DIFF WBC: CPT

## 2020-11-03 PROCEDURE — 87522 HEPATITIS C REVRS TRNSCRPJ: CPT

## 2020-11-03 PROCEDURE — 80053 COMPREHEN METABOLIC PANEL: CPT

## 2020-11-03 PROCEDURE — 85610 PROTHROMBIN TIME: CPT

## 2020-11-03 PROCEDURE — 82140 ASSAY OF AMMONIA: CPT

## 2020-11-03 PROCEDURE — 36415 COLL VENOUS BLD VENIPUNCTURE: CPT

## 2020-11-03 PROCEDURE — 83540 ASSAY OF IRON: CPT

## 2020-11-05 LAB
HCV QNT BY NAAT IU/ML: ABNORMAL
HCV QNT BY NAAT LOG IU/ML: 4.07 LOG IU/ML
INTERPRETATION: DETECTED

## 2021-04-16 ENCOUNTER — APPOINTMENT (OUTPATIENT)
Dept: GENERAL RADIOLOGY | Age: 66
End: 2021-04-16
Payer: MEDICARE

## 2021-04-16 ENCOUNTER — HOSPITAL ENCOUNTER (OUTPATIENT)
Age: 66
Setting detail: OBSERVATION
Discharge: HOME | End: 2021-04-17
Attending: EMERGENCY MEDICINE | Admitting: INTERNAL MEDICINE
Payer: MEDICARE

## 2021-04-16 DIAGNOSIS — R94.31 ABNORMAL EKG: ICD-10-CM

## 2021-04-16 DIAGNOSIS — K70.30 ALCOHOLIC CIRRHOSIS OF LIVER WITHOUT ASCITES (HCC): ICD-10-CM

## 2021-04-16 DIAGNOSIS — R07.9 CHEST PAIN, UNSPECIFIED TYPE: Primary | ICD-10-CM

## 2021-04-16 DIAGNOSIS — D61.818 PANCYTOPENIA (HCC): ICD-10-CM

## 2021-04-16 LAB
A/G RATIO: 0.7 (ref 1.1–2.2)
ALBUMIN SERPL-MCNC: 4.2 G/DL (ref 3.4–5)
ALP BLD-CCNC: 195 U/L (ref 40–129)
ALT SERPL-CCNC: 28 U/L (ref 10–40)
ANION GAP SERPL CALCULATED.3IONS-SCNC: 11 MMOL/L (ref 3–16)
ANISOCYTOSIS: ABNORMAL
AST SERPL-CCNC: 99 U/L (ref 15–37)
BASOPHILS ABSOLUTE: 0.1 K/UL (ref 0–0.2)
BASOPHILS RELATIVE PERCENT: 3.7 %
BILIRUB SERPL-MCNC: 3.7 MG/DL (ref 0–1)
BUN BLDV-MCNC: 9 MG/DL (ref 7–20)
CALCIUM SERPL-MCNC: 9.5 MG/DL (ref 8.3–10.6)
CHLORIDE BLD-SCNC: 99 MMOL/L (ref 99–110)
CO2: 26 MMOL/L (ref 21–32)
CREAT SERPL-MCNC: 0.7 MG/DL (ref 0.8–1.3)
EKG ATRIAL RATE: 59 BPM
EKG ATRIAL RATE: 70 BPM
EKG DIAGNOSIS: NORMAL
EKG DIAGNOSIS: NORMAL
EKG P AXIS: -11 DEGREES
EKG P AXIS: 20 DEGREES
EKG P-R INTERVAL: 152 MS
EKG P-R INTERVAL: 158 MS
EKG Q-T INTERVAL: 382 MS
EKG Q-T INTERVAL: 434 MS
EKG QRS DURATION: 92 MS
EKG QRS DURATION: 92 MS
EKG QTC CALCULATION (BAZETT): 412 MS
EKG QTC CALCULATION (BAZETT): 429 MS
EKG R AXIS: -21 DEGREES
EKG R AXIS: -27 DEGREES
EKG T AXIS: -72 DEGREES
EKG T AXIS: 22 DEGREES
EKG VENTRICULAR RATE: 59 BPM
EKG VENTRICULAR RATE: 70 BPM
EOSINOPHILS ABSOLUTE: 0 K/UL (ref 0–0.6)
EOSINOPHILS RELATIVE PERCENT: 1.5 %
GFR AFRICAN AMERICAN: >60
GFR NON-AFRICAN AMERICAN: >60
GLOBULIN: 5.8 G/DL
GLUCOSE BLD-MCNC: 108 MG/DL (ref 70–99)
HCT VFR BLD CALC: 35.3 % (ref 40.5–52.5)
HEMOGLOBIN: 11.3 G/DL (ref 13.5–17.5)
LIPASE: 34 U/L (ref 13–60)
LYMPHOCYTES ABSOLUTE: 0.8 K/UL (ref 1–5.1)
LYMPHOCYTES RELATIVE PERCENT: 24.8 %
MAGNESIUM: 1.3 MG/DL (ref 1.8–2.4)
MCH RBC QN AUTO: 30.4 PG (ref 26–34)
MCHC RBC AUTO-ENTMCNC: 31.9 G/DL (ref 31–36)
MCV RBC AUTO: 95.2 FL (ref 80–100)
MONOCYTES ABSOLUTE: 0.3 K/UL (ref 0–1.3)
MONOCYTES RELATIVE PERCENT: 10.2 %
NEUTROPHILS ABSOLUTE: 1.8 K/UL (ref 1.7–7.7)
NEUTROPHILS RELATIVE PERCENT: 59.8 %
PDW BLD-RTO: 19.1 % (ref 12.4–15.4)
PLATELET # BLD: 70 K/UL (ref 135–450)
PLATELET SLIDE REVIEW: ADEQUATE
PMV BLD AUTO: 8.3 FL (ref 5–10.5)
POIKILOCYTES: ABNORMAL
POTASSIUM REFLEX MAGNESIUM: 3.7 MMOL/L (ref 3.5–5.1)
RBC # BLD: 3.71 M/UL (ref 4.2–5.9)
SARS-COV-2, NAAT: NOT DETECTED
SLIDE REVIEW: ABNORMAL
SODIUM BLD-SCNC: 136 MMOL/L (ref 136–145)
TARGET CELLS: ABNORMAL
TOTAL PROTEIN: 10 G/DL (ref 6.4–8.2)
TROPONIN: <0.01 NG/ML
WBC # BLD: 3 K/UL (ref 4–11)

## 2021-04-16 PROCEDURE — 99285 EMERGENCY DEPT VISIT HI MDM: CPT

## 2021-04-16 PROCEDURE — 6370000000 HC RX 637 (ALT 250 FOR IP): Performed by: PHYSICIAN ASSISTANT

## 2021-04-16 PROCEDURE — 6360000002 HC RX W HCPCS: Performed by: INTERNAL MEDICINE

## 2021-04-16 PROCEDURE — 6370000000 HC RX 637 (ALT 250 FOR IP): Performed by: EMERGENCY MEDICINE

## 2021-04-16 PROCEDURE — 93005 ELECTROCARDIOGRAM TRACING: CPT | Performed by: INTERNAL MEDICINE

## 2021-04-16 PROCEDURE — 83735 ASSAY OF MAGNESIUM: CPT

## 2021-04-16 PROCEDURE — 99218 PR INITIAL OBSERVATION CARE/DAY 30 MINUTES: CPT | Performed by: NURSE PRACTITIONER

## 2021-04-16 PROCEDURE — 36415 COLL VENOUS BLD VENIPUNCTURE: CPT

## 2021-04-16 PROCEDURE — G0378 HOSPITAL OBSERVATION PER HR: HCPCS

## 2021-04-16 PROCEDURE — 96375 TX/PRO/DX INJ NEW DRUG ADDON: CPT

## 2021-04-16 PROCEDURE — 80053 COMPREHEN METABOLIC PANEL: CPT

## 2021-04-16 PROCEDURE — 84484 ASSAY OF TROPONIN QUANT: CPT

## 2021-04-16 PROCEDURE — 2580000003 HC RX 258: Performed by: PHYSICIAN ASSISTANT

## 2021-04-16 PROCEDURE — 93005 ELECTROCARDIOGRAM TRACING: CPT | Performed by: EMERGENCY MEDICINE

## 2021-04-16 PROCEDURE — 85025 COMPLETE CBC W/AUTO DIFF WBC: CPT

## 2021-04-16 PROCEDURE — 93010 ELECTROCARDIOGRAM REPORT: CPT | Performed by: INTERNAL MEDICINE

## 2021-04-16 PROCEDURE — 6370000000 HC RX 637 (ALT 250 FOR IP): Performed by: INTERNAL MEDICINE

## 2021-04-16 PROCEDURE — 83690 ASSAY OF LIPASE: CPT

## 2021-04-16 PROCEDURE — 6360000002 HC RX W HCPCS: Performed by: EMERGENCY MEDICINE

## 2021-04-16 PROCEDURE — 71045 X-RAY EXAM CHEST 1 VIEW: CPT

## 2021-04-16 PROCEDURE — 87635 SARS-COV-2 COVID-19 AMP PRB: CPT

## 2021-04-16 PROCEDURE — C9113 INJ PANTOPRAZOLE SODIUM, VIA: HCPCS | Performed by: EMERGENCY MEDICINE

## 2021-04-16 RX ORDER — PANTOPRAZOLE SODIUM 40 MG/10ML
40 INJECTION, POWDER, LYOPHILIZED, FOR SOLUTION INTRAVENOUS ONCE
Status: COMPLETED | OUTPATIENT
Start: 2021-04-16 | End: 2021-04-16

## 2021-04-16 RX ORDER — POTASSIUM CHLORIDE 20 MEQ/1
40 TABLET, EXTENDED RELEASE ORAL PRN
Status: DISCONTINUED | OUTPATIENT
Start: 2021-04-16 | End: 2021-04-17 | Stop reason: HOSPADM

## 2021-04-16 RX ORDER — ONDANSETRON 2 MG/ML
4 INJECTION INTRAMUSCULAR; INTRAVENOUS EVERY 6 HOURS PRN
Status: DISCONTINUED | OUTPATIENT
Start: 2021-04-16 | End: 2021-04-17 | Stop reason: HOSPADM

## 2021-04-16 RX ORDER — MORPHINE SULFATE 2 MG/ML
2 INJECTION, SOLUTION INTRAMUSCULAR; INTRAVENOUS
Status: COMPLETED | OUTPATIENT
Start: 2021-04-16 | End: 2021-04-16

## 2021-04-16 RX ORDER — MORPHINE SULFATE 2 MG/ML
2 INJECTION, SOLUTION INTRAMUSCULAR; INTRAVENOUS
Status: DISCONTINUED | OUTPATIENT
Start: 2021-04-16 | End: 2021-04-17 | Stop reason: HOSPADM

## 2021-04-16 RX ORDER — PROMETHAZINE HYDROCHLORIDE 25 MG/1
12.5 TABLET ORAL EVERY 6 HOURS PRN
Status: DISCONTINUED | OUTPATIENT
Start: 2021-04-16 | End: 2021-04-17 | Stop reason: HOSPADM

## 2021-04-16 RX ORDER — POLYETHYLENE GLYCOL 3350 17 G/17G
17 POWDER, FOR SOLUTION ORAL DAILY PRN
Status: DISCONTINUED | OUTPATIENT
Start: 2021-04-16 | End: 2021-04-17 | Stop reason: HOSPADM

## 2021-04-16 RX ORDER — SODIUM CHLORIDE 0.9 % (FLUSH) 0.9 %
5-40 SYRINGE (ML) INJECTION EVERY 12 HOURS SCHEDULED
Status: DISCONTINUED | OUTPATIENT
Start: 2021-04-16 | End: 2021-04-17 | Stop reason: HOSPADM

## 2021-04-16 RX ORDER — ATORVASTATIN CALCIUM 40 MG/1
40 TABLET, FILM COATED ORAL NIGHTLY
Status: DISCONTINUED | OUTPATIENT
Start: 2021-04-16 | End: 2021-04-17 | Stop reason: HOSPADM

## 2021-04-16 RX ORDER — M-VIT,TX,IRON,MINS/CALC/FOLIC 27MG-0.4MG
1 TABLET ORAL DAILY
Status: DISCONTINUED | OUTPATIENT
Start: 2021-04-16 | End: 2021-04-17 | Stop reason: HOSPADM

## 2021-04-16 RX ORDER — ASPIRIN 81 MG/1
162 TABLET, CHEWABLE ORAL ONCE
Status: COMPLETED | OUTPATIENT
Start: 2021-04-16 | End: 2021-04-16

## 2021-04-16 RX ORDER — MAGNESIUM SULFATE 1 G/100ML
1000 INJECTION INTRAVENOUS PRN
Status: DISCONTINUED | OUTPATIENT
Start: 2021-04-16 | End: 2021-04-17 | Stop reason: HOSPADM

## 2021-04-16 RX ORDER — SODIUM CHLORIDE 9 MG/ML
25 INJECTION, SOLUTION INTRAVENOUS PRN
Status: DISCONTINUED | OUTPATIENT
Start: 2021-04-16 | End: 2021-04-17 | Stop reason: HOSPADM

## 2021-04-16 RX ORDER — POTASSIUM CHLORIDE 7.45 MG/ML
10 INJECTION INTRAVENOUS PRN
Status: DISCONTINUED | OUTPATIENT
Start: 2021-04-16 | End: 2021-04-17 | Stop reason: HOSPADM

## 2021-04-16 RX ORDER — ASPIRIN 81 MG/1
81 TABLET, CHEWABLE ORAL DAILY
Status: DISCONTINUED | OUTPATIENT
Start: 2021-04-17 | End: 2021-04-17 | Stop reason: HOSPADM

## 2021-04-16 RX ORDER — SODIUM CHLORIDE 0.9 % (FLUSH) 0.9 %
5-40 SYRINGE (ML) INJECTION PRN
Status: DISCONTINUED | OUTPATIENT
Start: 2021-04-16 | End: 2021-04-17 | Stop reason: HOSPADM

## 2021-04-16 RX ORDER — PANTOPRAZOLE SODIUM 40 MG/10ML
40 INJECTION, POWDER, LYOPHILIZED, FOR SOLUTION INTRAVENOUS DAILY
Status: DISCONTINUED | OUTPATIENT
Start: 2021-04-17 | End: 2021-04-17 | Stop reason: HOSPADM

## 2021-04-16 RX ORDER — NITROGLYCERIN 0.4 MG/1
0.4 TABLET SUBLINGUAL EVERY 5 MIN PRN
Status: DISCONTINUED | OUTPATIENT
Start: 2021-04-16 | End: 2021-04-17 | Stop reason: HOSPADM

## 2021-04-16 RX ORDER — PANTOPRAZOLE SODIUM 40 MG/1
40 GRANULE, DELAYED RELEASE ORAL
Status: ON HOLD | COMMUNITY
End: 2021-09-19

## 2021-04-16 RX ORDER — FENTANYL CITRATE 50 UG/ML
50 INJECTION, SOLUTION INTRAMUSCULAR; INTRAVENOUS ONCE
Status: COMPLETED | OUTPATIENT
Start: 2021-04-16 | End: 2021-04-16

## 2021-04-16 RX ORDER — LEVETIRACETAM 500 MG/1
500 TABLET ORAL 2 TIMES DAILY
Status: ON HOLD | COMMUNITY
End: 2021-09-19

## 2021-04-16 RX ADMIN — PANTOPRAZOLE SODIUM 40 MG: 40 INJECTION, POWDER, FOR SOLUTION INTRAVENOUS at 13:16

## 2021-04-16 RX ADMIN — MORPHINE SULFATE 2 MG: 2 INJECTION, SOLUTION INTRAMUSCULAR; INTRAVENOUS at 22:10

## 2021-04-16 RX ADMIN — Medication 10 ML: at 22:10

## 2021-04-16 RX ADMIN — FENTANYL CITRATE 50 MCG: 50 INJECTION, SOLUTION INTRAMUSCULAR; INTRAVENOUS at 13:16

## 2021-04-16 RX ADMIN — ASPIRIN 162 MG: 81 TABLET, CHEWABLE ORAL at 13:16

## 2021-04-16 RX ADMIN — ATORVASTATIN CALCIUM 40 MG: 40 TABLET, FILM COATED ORAL at 22:10

## 2021-04-16 RX ADMIN — NITROGLYCERIN 0.4 MG: 0.4 TABLET SUBLINGUAL at 18:10

## 2021-04-16 RX ADMIN — LIDOCAINE HYDROCHLORIDE: 20 SOLUTION ORAL; TOPICAL at 21:13

## 2021-04-16 RX ADMIN — NITROGLYCERIN 0.5 INCH: 20 OINTMENT TOPICAL at 12:19

## 2021-04-16 ASSESSMENT — PAIN DESCRIPTION - LOCATION: LOCATION: CHEST

## 2021-04-16 ASSESSMENT — PAIN SCALES - GENERAL
PAINLEVEL_OUTOF10: 8
PAINLEVEL_OUTOF10: 8

## 2021-04-16 ASSESSMENT — PAIN - FUNCTIONAL ASSESSMENT: PAIN_FUNCTIONAL_ASSESSMENT: PREVENTS OR INTERFERES SOME ACTIVE ACTIVITIES AND ADLS

## 2021-04-16 ASSESSMENT — PAIN DESCRIPTION - PAIN TYPE
TYPE: ACUTE PAIN
TYPE: ACUTE PAIN

## 2021-04-16 ASSESSMENT — PAIN DESCRIPTION - FREQUENCY: FREQUENCY: CONTINUOUS

## 2021-04-16 ASSESSMENT — PAIN DESCRIPTION - DESCRIPTORS: DESCRIPTORS: SHARP

## 2021-04-16 ASSESSMENT — PAIN DESCRIPTION - PROGRESSION
CLINICAL_PROGRESSION: NOT CHANGED
CLINICAL_PROGRESSION: NOT CHANGED

## 2021-04-16 NOTE — H&P
Hospital Medicine History & Physical      PCP: Referring Not In System (Inactive)    Date of Admission: 4/16/2021    Date of Service: Pt seen/examined on 4/16/2021     Chief Complaint:    Chief Complaint   Patient presents with    Chest Pain       History Of Present Illness: The patient is a 72 y.o. male with hepatitis C and alcoholic cirrhosis  who presented to [facility] ED with complaint of chest pain that started last night. Patient describes the pain as a tightness. It was associated with nothing. Aggravated by nothing. Alleviated by nothing. At its worst the pain was a 9/10 on the pain scale. He states he occasionally gets dizzy and falls. Denies any URI symptoms, cough, shortness of breath, pain with inspiration, recent new exercises or heavy lifting, abd pain, n/v/d. The patient has not had a cardiac work up in the past     Cardiac risk factors:   - HTN denies  - HLD denies  - DM denies  - Tobacco abuse current  - FHx of CAD brother; sister, father    Past Medical History:        Diagnosis Date    Hepatitis C antibody positive in blood 11/16/2017    MI (myocardial infarction) [facility])        Past Surgical History:        Procedure Laterality Date    CARDIAC CATHETERIZATION      heart cath    COLONOSCOPY  11/17/2017    Initial Colonoscopy; Multiple Colon Polyps    [place] NJX DX/THER SBST INTRLMNR CRV/THRC W/IMG GDN Right 8/13/2018    RIGHT CERVICAL SEVEN THORACIC ONE EPIDURAL STEROID INJECTION SITE CONFIRMED BY FLUOROSCOPY performed by Valente Rascon MD at [address] [place]  01/18/2018    Gastritis, [place]       Medications Prior to Admission:    Prior to Admission medications    Medication Sig Start Date End Date Taking?  Authorizing Provider   pantoprazole sodium (PROTONIX) 40 MG PACK packet Take 40 mg by mouth every morning (before breakfast)   Yes Historical Provider, MD   levETIRAcetam (KEPPRA) 500 MG tablet Take 500 mg by mouth 2 times nonfocal    Psych: Oriented x 3. No anxiety or agitation. CARDIAC ENZYMES  Recent Labs     04/16/21  1104   TROPONINI <0.01       U/A:    Lab Results   Component Value Date    COLORU Yellow 07/09/2018    CLARITYU Clear 07/09/2018    SPECGRAV <=1.005 07/09/2018    LEUKOCYTESUR Negative 07/09/2018    BLOODU Negative 07/09/2018    GLUCOSEU Negative 07/09/2018       CULTURES  COVID: pending    EKG:  I have reviewed the EKG with the following interpretation:   Sinus rhythm with Premature supraventricular complexes with occasional and consecutive Premature ventricular complexes, ST & T wave abnormality, consider inferolateral ischemia, Abnormal ECGWhen compared with ECG of 08-JUL-2018 22:30,, Premature supraventricular complexes are now Present, ST now depressed in Inferior leads, T wave inversion now evident in Inferior leads, Inverted T waves have replaced nonspecific T wave abnormality in Lateral leads, QT has shortened    RADIOLOGY  XR CHEST PORTABLE   Final Result   No radiographic evidence of acute pulmonary disease. ASSESSMENT/PLAN:    Chest pain  - Admitted for cardiac evaluation to PCU  - Serial troponin negative x2, EKG abnormal; as above. Repeat EKG tomorrow am.   - monitor on tele. Consult cardiology. - aspirin, Atorvastatin  - NPO after midnight. Stress test ordered. Alcoholic cirrhosis  Hepatitis C  Elevated LFT's  Hyperbilirubinemia   - monitor LFT's    Thrombocytopenia   Pancytopenia (due to liver disease)  - platelets 70. Hold Lovenox, use SCD's. -  Monitor CBC    H/o esophageal varices  - IV PPI Daily.      DVT Prophylaxis: SCD's  Diet: Low sodium diet; no Caffeine   Code Status: Full Code      Noe PULLIAM-C  4/16/2021

## 2021-04-16 NOTE — PLAN OF CARE
36E with alcoholic cirrhosis presents with chest pressure, concern for cardiac etiology.  Admit to PCU, trend trop and lexiscan myoview in AM

## 2021-04-16 NOTE — ED NOTES
Spoke with PCU at this time about bed status and she states house keeping states bed is still dirty and it may be awhile before they get to [address], RN  04/16/21 2545 [facility]  04/16/21 4777

## 2021-04-16 NOTE — ED NOTES
Spoke with stacey in dietary and ordered a tray with low sodium of 2 grams and no caffeine at this time.       Lalita Phillips RN  04/16/21 2569

## 2021-04-16 NOTE — ED NOTES
Pt called out and c/o increase in chest pain from 6 to a 7-8 at this time. Yaritza Allen NP/hospitalist made aware.       Tom Esparza RN  04/16/21 6161

## 2021-04-16 NOTE — ED NOTES
Troponin and EKG complete per Merlin Ensign, NP orders. Nitro given, see MAR. Report given to floor nurse, Manda Quinn. Patient transported to PCU in wheelchair.       Wily Gonzalez RN  04/16/21 0476

## 2021-04-16 NOTE — ED NOTES
[address] called Gregorio Missy Benites  04/16/21 1650 [facility] called back. Edith Clarke  04/16/21 1250    1235 -  called back hung up before Dr. Sonya Bentley got to call.  She was on phone with Hospitalist.     Edith Clarke  04/16/21 1250    1303 -  called back     [place]  04/16/21 1306

## 2021-04-16 NOTE — ED PROVIDER NOTES
2215 [facility] ED    CHIEF COMPLAINT  Chest Pain       HISTORY OF PRESENT ILLNESS  Crystal Lou is a 72 y.o. male with a past medical history of coronary artery disease and alcoholic cirrhosis and hepatitis C who presents to the ED complaining of chest pain. The patient states that the pain started last night at midnight. He describes it as a pressure sensation across his chest.  It is not radiating. It is exertional.  He has some mild shortness of breath, not too significant. The pain is not pleuritic. He has not taken anything for the pain. He denies change in cough or sputum production. Denies fever or chills. Denies diaphoresis or dizziness. He has history of esophageal varices. Most recent banding was several months ago he sees a GI at [facility]. He denies any nausea vomiting or hematemesis. Denies hematochezia or melena. He does take medications for cirrhosis. When asked if he still drinks alcohol, his wife states \"he sneaks it. \"    No other complaints, modifying factors or associated symptoms. I have reviewed the following from the nursing documentation.     Past Medical History:   Diagnosis Date    COPD (chronic obstructive pulmonary disease) (HCC)     Hepatitis C antibody positive in blood 11/16/2017    History of blood transfusion     MI (myocardial infarction) [facility])      Past Surgical History:   Procedure Laterality Date    CARDIAC CATHETERIZATION      heart cath    COLONOSCOPY  11/17/2017    Initial Colonoscopy; Multiple Colon Polyps    ENDOSCOPY, COLON, DIAGNOSTIC      EYE SURGERY      cataract removal    [place] NJX DX/THER SBST INTRLMNR CRV/THRC W/IMG GDN Right 8/13/2018    RIGHT CERVICAL SEVEN THORACIC ONE EPIDURAL STEROID INJECTION SITE CONFIRMED BY FLUOROSCOPY performed by Lele Patel MD at [address]  01/18/2018    Gastritis, [place]     Family History   Problem Relation Age of Onset    Other Mother      Social History  0.9 % sodium chloride infusion  25 mL Intravenous PRN David Halsted, PA-C        promethazine (PHENERGAN) tablet 12.5 mg  12.5 mg Oral Q6H PRN David Halsted, PA-C        Or    ondansetron (ZOFRAN) injection 4 mg  4 mg Intravenous Q6H PRN David Halsted, PA-C        polyethylene glycol (GLYCOLAX) packet 17 g  17 g Oral Daily PRN David Halsted, PA-C        [START ON 4/17/2021] aspirin chewable tablet 81 mg  81 mg Oral Daily David Halsted, PA-C        atorvastatin (LIPITOR) tablet 40 mg  40 mg Oral Nightly David Halsted, PA-C        nitroGLYCERIN (NITROSTAT) SL tablet 0.4 mg  0.4 mg Sublingual Q5 Min PRN David Halsted, PA-C   0.4 mg at 04/16/21 1810    regadenoson (LEXISCAN) injection 0.4 mg  0.4 mg Intravenous ONCE PRN David Halsted, PA-C        [START ON 4/17/2021] pantoprazole (PROTONIX) injection 40 mg  40 mg Intravenous Daily David Halsted, PA-C        morphine (PF) injection 2 mg  2 mg Intravenous Once PRN Shirley Phillips, DO         Allergies   Allergen Reactions    Codeine Hives       REVIEW OF SYSTEMS  10 systems reviewed, pertinent positives per HPI otherwise noted to be negative. PHYSICAL EXAM  /72   Pulse 55   Temp 97.7 °F (36.5 °C) (Oral)   Resp 16   Ht 5' 8\" (1.727 m)   Wt 152 lb 7 oz (69.1 kg)   SpO2 98%   BMI 23.18 kg/m²    Physical exam:  General appearance: awake and cooperative. Mild pain distress. Chronically ill appearing, acutely non toxic. Skin: jaundiced. Warm and dry. No rashes or lesions. HENT: Normocephalic. Atraumatic. Mucus membranes are dry. Neck: supple  Eyes: DAMARI. EOM intact. Scleral icterus present   Heart: RRR. No murmurs. Lungs: Respirations unlabored. Breath sounds diminished diffusely. No wheezes, rales, or rhonchi. fair air exchange  Abdomen: distended but soft. Mild, diffuse tenderness (patient states is chronic). Non distended. No peritoneal signs. Musculoskeletal: No extremity edema. Compartments soft. No deformity. No tenderness in the extremities. All extremities neurovascularly intact. Radial, Dp, and PT pulses +2/4 bilaterally  Neurological: Alert and oriented. No focal deficits. No aphasia or dysarthria. No gait ataxia. Psychiatric: Normal mood and affect. LABS  I have reviewed all labs for this visit.    Results for orders placed or performed during the hospital encounter of 04/16/21   COVID-19, Rapid   Result Value Ref Range    SARS-CoV-2, NAAT Not Detected Not Detected   CBC Auto Differential   Result Value Ref Range    WBC 3.0 (L) 4.0 - 11.0 K/uL    RBC 3.71 (L) 4.20 - 5.90 M/uL    Hemoglobin 11.3 (L) 13.5 - 17.5 g/dL    Hematocrit 35.3 (L) 40.5 - 52.5 %    MCV 95.2 80.0 - 100.0 fL    MCH 30.4 26.0 - 34.0 pg    MCHC 31.9 31.0 - 36.0 g/dL    RDW 19.1 (H) 12.4 - 15.4 %    Platelets 70 (L) 017 - 450 K/uL    MPV 8.3 5.0 - 10.5 fL    PLATELET SLIDE REVIEW Adequate     SLIDE REVIEW see below     Neutrophils % 59.8 %    Lymphocytes % 24.8 %    Monocytes % 10.2 %    Eosinophils % 1.5 %    Basophils % 3.7 %    Neutrophils Absolute 1.8 1.7 - 7.7 K/uL    Lymphocytes Absolute 0.8 (L) 1.0 - 5.1 K/uL    Monocytes Absolute 0.3 0.0 - 1.3 K/uL    Eosinophils Absolute 0.0 0.0 - 0.6 K/uL    Basophils Absolute 0.1 0.0 - 0.2 K/uL    Anisocytosis 1+ (A)     Poikilocytes 1+ (A)     Target Cells 1+ (A)    Comprehensive Metabolic Panel w/ Reflex to MG   Result Value Ref Range    Sodium 136 136 - 145 mmol/L    Potassium reflex Magnesium 3.7 3.5 - 5.1 mmol/L    Chloride 99 99 - 110 mmol/L    CO2 26 21 - 32 mmol/L    Anion Gap 11 3 - 16    Glucose 108 (H) 70 - 99 mg/dL    BUN 9 7 - 20 mg/dL    CREATININE 0.7 (L) 0.8 - 1.3 mg/dL    GFR Non-African American >60 >60    GFR African American >60 >60    Calcium 9.5 8.3 - 10.6 mg/dL    Total Protein 10.0 (H) 6.4 - 8.2 g/dL    Albumin 4.2 3.4 - 5.0 g/dL    Albumin/Globulin Ratio 0.7 (L) 1.1 - 2.2    Total Bilirubin 3.7 (H) 0.0 - 1.0 mg/dL    Alkaline Phosphatase 195 (H) 40 - 129 U/L normal ECGWhen compared with ECG of 16-APR-2021 17:57,Fusion complexes are now PresentNonspecific T wave abnormality no longer evident in Lateral leads       ECG  The Ekg interpreted by me shows 1108  normal sinus rhythm with a rate of 70 with PVC  Axis is   Left axis deviation  QTc is  within an acceptable range  Intervals and Durations are unremarkable. ST Segments: T wave inversions II and III   change from prior EKG dated 7/8/18    The Ekg interpreted by me shows 1757  Sinus bradycardia with a rate of 59 with frequent PVC  Axis is   Left axis deviation  QTc is  within an acceptable range  Intervals and Durations are unremarkable. ST Segments: no acute change  No significant change from prior EKG dated today 4/16          RADIOLOGY  Xr Chest Portable    Result Date: 4/16/2021  EXAMINATION: ONE XRAY VIEW OF THE CHEST 4/16/2021 11:18 am COMPARISON: Chest x-ray dated 07/08/2018 HISTORY: ORDERING SYSTEM PROVIDED HISTORY: chest pain TECHNOLOGIST PROVIDED HISTORY: Reason for exam:->chest pain Reason for Exam: Chest pain Acuity: Acute Type of Exam: Initial FINDINGS: HEART/MEDIASTINUM: The cardiomediastinal silhouette is within normal limits. PLEURA/LUNGS: There are no focal consolidations or pleural effusions. There is no appreciable pneumothorax. Calcified granuloma noted in the left lower lung. BONES/SOFT TISSUE: No acute abnormality. No radiographic evidence of acute pulmonary disease. ED COURSE/MDM  Patient seen and evaluated. Old records reviewed. Labs and imaging reviewed and results discussed with patient. Patient is a 78-year-old male with a history of cirrhosis and coronary artery disease presenting with chest pain. His vital signs are within normal limits. He has chronically ill but acutely nontoxic on appearance. His chest pain is concerning for cardiac etiology. He is given nitro here, denies any change in his pain.   I did hold off initially on giving aspirin due to his history of esophageal varices. I spoke with GI, discussed the case with them, and they state that if the banding has not occurred within the past several months, aspirin should be appropriate for the patient. Therefore he is given aspirin 162. The patient's EKG does show a change when comparison from EKG in 2018. There are T wave inversions in the inferior and lateral leads. There are PVCs as well. He did have some increase in chest pain several hours into his stay, EKG is repeated which again shows the nonspecific T wave inversions, and additional PVCs shown. Second troponin is also negative. His pain was controlled with fentanyl as his blood pressures are soft. The patient has labs indicating liver disease with his known history. He is pancytopenic. He also has an elevated bilirubin of 3.5 today, increased from prior studies. His abdomen is distended but soft, no indication for CT acutely. He will require admission for further treatment of chest pain. I spoke with hospitalist ISAIAH who accepts the patient for admission.       During the patient's ED course, the patient was given:  Medications   therapeutic multivitamin-minerals 1 tablet (1 tablet Oral Not Given 4/16/21 1845)   sodium chloride flush 0.9 % injection 5-40 mL (has no administration in time range)   sodium chloride flush 0.9 % injection 5-40 mL (has no administration in time range)   0.9 % sodium chloride infusion (has no administration in time range)   promethazine (PHENERGAN) tablet 12.5 mg (has no administration in time range)     Or   ondansetron (ZOFRAN) injection 4 mg (has no administration in time range)   polyethylene glycol (GLYCOLAX) packet 17 g (has no administration in time range)   aspirin chewable tablet 81 mg (has no administration in time range)   atorvastatin (LIPITOR) tablet 40 mg (has no administration in time range)   nitroGLYCERIN (NITROSTAT) SL tablet 0.4 mg (0.4 mg Sublingual Given 4/16/21 1810)   regadenoson (LEXISCAN) injection 0.4 mg (has no administration in time range)   pantoprazole (PROTONIX) injection 40 mg (has no administration in time range)   morphine (PF) injection 2 mg (has no administration in time range)   nitroglycerin (NITRO-BID) 2 % ointment 0.5 inch (0.5 inches Topical Given 4/16/21 1219)   fentaNYL (SUBLIMAZE) injection 50 mcg (50 mcg Intravenous Given 4/16/21 1316)   aspirin chewable tablet 162 mg (162 mg Oral Given 4/16/21 1316)   pantoprazole (PROTONIX) injection 40 mg (40 mg Intravenous Given 4/16/21 1316)   aluminum & magnesium hydroxide-simethicone (MAALOX) 30 mL, lidocaine viscous hcl (XYLOCAINE) 5 mL (GI COCKTAIL) ( Oral Given 4/16/21 2113)        CLINICAL IMPRESSION  1. Chest pain, unspecified type    2. Abnormal EKG    3. Alcoholic cirrhosis of liver without ascites (HCC)    4. Pancytopenia (HCC)        Blood pressure 111/72, pulse 55, temperature 97.7 °F (36.5 °C), temperature source Oral, resp. rate 16, height 5' 8\" (1.727 m), weight 152 lb 7 oz (69.1 kg), SpO2 98 %. Patient was given scripts for the following medications. I counseled patient how to take these medications. Current Discharge Medication List          Follow-up with:  No follow-up provider specified. DISCLAIMER: This chart was created using Dragon dictation software. Efforts were made by me to ensure accuracy, however some errors may be present due to limitations of this technology and occasionally words are not transcribed correctly.        Yvrose [place]  04/16/21 3708

## 2021-04-17 ENCOUNTER — APPOINTMENT (OUTPATIENT)
Dept: NUCLEAR MEDICINE | Age: 66
End: 2021-04-17
Payer: MEDICARE

## 2021-04-17 VITALS
HEIGHT: 68 IN | SYSTOLIC BLOOD PRESSURE: 123 MMHG | DIASTOLIC BLOOD PRESSURE: 70 MMHG | RESPIRATION RATE: 18 BRPM | HEART RATE: 58 BPM | OXYGEN SATURATION: 99 % | WEIGHT: 153.8 LBS | BODY MASS INDEX: 23.31 KG/M2 | TEMPERATURE: 97.2 F

## 2021-04-17 LAB
ALBUMIN SERPL-MCNC: 3.2 G/DL (ref 3.4–5)
ALP BLD-CCNC: 129 U/L (ref 40–129)
ALT SERPL-CCNC: 20 U/L (ref 10–40)
ANION GAP SERPL CALCULATED.3IONS-SCNC: 10 MMOL/L (ref 3–16)
AST SERPL-CCNC: 76 U/L (ref 15–37)
BILIRUB SERPL-MCNC: 3.4 MG/DL (ref 0–1)
BILIRUBIN DIRECT: 2.3 MG/DL (ref 0–0.3)
BILIRUBIN, INDIRECT: 1.1 MG/DL (ref 0–1)
BUN BLDV-MCNC: 17 MG/DL (ref 7–20)
CALCIUM SERPL-MCNC: 8.4 MG/DL (ref 8.3–10.6)
CHLORIDE BLD-SCNC: 100 MMOL/L (ref 99–110)
CO2: 22 MMOL/L (ref 21–32)
CREAT SERPL-MCNC: 0.7 MG/DL (ref 0.8–1.3)
EKG ATRIAL RATE: 67 BPM
EKG ATRIAL RATE: 75 BPM
EKG DIAGNOSIS: NORMAL
EKG DIAGNOSIS: NORMAL
EKG P AXIS: 46 DEGREES
EKG P AXIS: 55 DEGREES
EKG P-R INTERVAL: 170 MS
EKG P-R INTERVAL: 172 MS
EKG Q-T INTERVAL: 410 MS
EKG Q-T INTERVAL: 420 MS
EKG QRS DURATION: 92 MS
EKG QRS DURATION: 98 MS
EKG QTC CALCULATION (BAZETT): 443 MS
EKG QTC CALCULATION (BAZETT): 457 MS
EKG R AXIS: -18 DEGREES
EKG R AXIS: -37 DEGREES
EKG T AXIS: 46 DEGREES
EKG T AXIS: 48 DEGREES
EKG VENTRICULAR RATE: 67 BPM
EKG VENTRICULAR RATE: 75 BPM
GFR AFRICAN AMERICAN: >60
GFR NON-AFRICAN AMERICAN: >60
GLUCOSE BLD-MCNC: 108 MG/DL (ref 70–99)
HCT VFR BLD CALC: 28.7 % (ref 40.5–52.5)
HEMOGLOBIN: 9.2 G/DL (ref 13.5–17.5)
LV EF: 56 %
LVEF MODALITY: NORMAL
MAGNESIUM: 2.8 MG/DL (ref 1.8–2.4)
MCH RBC QN AUTO: 30.3 PG (ref 26–34)
MCHC RBC AUTO-ENTMCNC: 32.1 G/DL (ref 31–36)
MCV RBC AUTO: 94.5 FL (ref 80–100)
PDW BLD-RTO: 18.7 % (ref 12.4–15.4)
PLATELET # BLD: 60 K/UL (ref 135–450)
PMV BLD AUTO: 8.8 FL (ref 5–10.5)
POTASSIUM REFLEX MAGNESIUM: 3.7 MMOL/L (ref 3.5–5.1)
RBC # BLD: 3.04 M/UL (ref 4.2–5.9)
SODIUM BLD-SCNC: 132 MMOL/L (ref 136–145)
TOTAL PROTEIN: 7.8 G/DL (ref 6.4–8.2)
WBC # BLD: 2.4 K/UL (ref 4–11)

## 2021-04-17 PROCEDURE — 96376 TX/PRO/DX INJ SAME DRUG ADON: CPT

## 2021-04-17 PROCEDURE — 36415 COLL VENOUS BLD VENIPUNCTURE: CPT

## 2021-04-17 PROCEDURE — 93005 ELECTROCARDIOGRAM TRACING: CPT | Performed by: PHYSICIAN ASSISTANT

## 2021-04-17 PROCEDURE — 93010 ELECTROCARDIOGRAM REPORT: CPT | Performed by: INTERNAL MEDICINE

## 2021-04-17 PROCEDURE — A9502 TC99M TETROFOSMIN: HCPCS | Performed by: INTERNAL MEDICINE

## 2021-04-17 PROCEDURE — 78452 HT MUSCLE IMAGE SPECT MULT: CPT

## 2021-04-17 PROCEDURE — 93017 CV STRESS TEST TRACING ONLY: CPT

## 2021-04-17 PROCEDURE — 6370000000 HC RX 637 (ALT 250 FOR IP): Performed by: PHYSICIAN ASSISTANT

## 2021-04-17 PROCEDURE — 83735 ASSAY OF MAGNESIUM: CPT

## 2021-04-17 PROCEDURE — 96366 THER/PROPH/DIAG IV INF ADDON: CPT

## 2021-04-17 PROCEDURE — 85027 COMPLETE CBC AUTOMATED: CPT

## 2021-04-17 PROCEDURE — 80048 BASIC METABOLIC PNL TOTAL CA: CPT

## 2021-04-17 PROCEDURE — C9113 INJ PANTOPRAZOLE SODIUM, VIA: HCPCS | Performed by: PHYSICIAN ASSISTANT

## 2021-04-17 PROCEDURE — 80076 HEPATIC FUNCTION PANEL: CPT

## 2021-04-17 PROCEDURE — 6360000002 HC RX W HCPCS: Performed by: INTERNAL MEDICINE

## 2021-04-17 PROCEDURE — 3430000000 HC RX DIAGNOSTIC RADIOPHARMACEUTICAL: Performed by: INTERNAL MEDICINE

## 2021-04-17 PROCEDURE — 6360000002 HC RX W HCPCS: Performed by: PHYSICIAN ASSISTANT

## 2021-04-17 PROCEDURE — G0378 HOSPITAL OBSERVATION PER HR: HCPCS

## 2021-04-17 PROCEDURE — 2580000003 HC RX 258: Performed by: PHYSICIAN ASSISTANT

## 2021-04-17 PROCEDURE — 99205 OFFICE O/P NEW HI 60 MIN: CPT | Performed by: INTERNAL MEDICINE

## 2021-04-17 PROCEDURE — 96365 THER/PROPH/DIAG IV INF INIT: CPT

## 2021-04-17 RX ORDER — MAGNESIUM SULFATE IN WATER 40 MG/ML
2000 INJECTION, SOLUTION INTRAVENOUS
Status: COMPLETED | OUTPATIENT
Start: 2021-04-17 | End: 2021-04-17

## 2021-04-17 RX ADMIN — ASPIRIN 81 MG: 81 TABLET, CHEWABLE ORAL at 13:32

## 2021-04-17 RX ADMIN — REGADENOSON 0.4 MG: 0.08 INJECTION, SOLUTION INTRAVENOUS at 10:45

## 2021-04-17 RX ADMIN — MORPHINE SULFATE 2 MG: 2 INJECTION, SOLUTION INTRAMUSCULAR; INTRAVENOUS at 09:22

## 2021-04-17 RX ADMIN — Medication 1 TABLET: at 13:32

## 2021-04-17 RX ADMIN — TETROFOSMIN 33.9 MILLICURIE: 1.38 INJECTION, POWDER, LYOPHILIZED, FOR SOLUTION INTRAVENOUS at 10:45

## 2021-04-17 RX ADMIN — MAGNESIUM SULFATE IN WATER 2000 MG: 40 INJECTION, SOLUTION INTRAVENOUS at 03:35

## 2021-04-17 RX ADMIN — PANTOPRAZOLE SODIUM 40 MG: 40 INJECTION, POWDER, FOR SOLUTION INTRAVENOUS at 09:23

## 2021-04-17 RX ADMIN — MORPHINE SULFATE 2 MG: 2 INJECTION, SOLUTION INTRAMUSCULAR; INTRAVENOUS at 05:31

## 2021-04-17 RX ADMIN — Medication 10 ML: at 09:23

## 2021-04-17 RX ADMIN — MAGNESIUM SULFATE IN WATER 2000 MG: 40 INJECTION, SOLUTION INTRAVENOUS at 01:01

## 2021-04-17 RX ADMIN — TETROFOSMIN 11 MILLICURIE: 1.38 INJECTION, POWDER, LYOPHILIZED, FOR SOLUTION INTRAVENOUS at 09:50

## 2021-04-17 ASSESSMENT — PAIN SCALES - GENERAL
PAINLEVEL_OUTOF10: 6
PAINLEVEL_OUTOF10: 6

## 2021-04-17 ASSESSMENT — PAIN DESCRIPTION - ORIENTATION: ORIENTATION: RIGHT;LEFT

## 2021-04-17 ASSESSMENT — PAIN DESCRIPTION - PROGRESSION
CLINICAL_PROGRESSION: NOT CHANGED
CLINICAL_PROGRESSION: GRADUALLY WORSENING
CLINICAL_PROGRESSION: NOT CHANGED

## 2021-04-17 ASSESSMENT — PAIN DESCRIPTION - ONSET: ONSET: ON-GOING

## 2021-04-17 ASSESSMENT — PAIN DESCRIPTION - FREQUENCY: FREQUENCY: CONTINUOUS

## 2021-04-17 ASSESSMENT — PAIN DESCRIPTION - DESCRIPTORS
DESCRIPTORS: ACHING
DESCRIPTORS: SHARP

## 2021-04-17 ASSESSMENT — PAIN DESCRIPTION - LOCATION: LOCATION: CHEST

## 2021-04-17 ASSESSMENT — PAIN - FUNCTIONAL ASSESSMENT: PAIN_FUNCTIONAL_ASSESSMENT: PREVENTS OR INTERFERES SOME ACTIVE ACTIVITIES AND ADLS

## 2021-04-17 NOTE — PROGRESS NOTES
Pt completed Lexiscan stress test. Pt denies CP. Pt is drinking a cola while waiting to have second scan completed and then will return to assigned room in PCU.

## 2021-04-17 NOTE — PROGRESS NOTES
Shift assessment completed, a/ox4. C/o cp, new orders obtained and cp improved with morphine 2mg IV. VSS. In bed with call light in reach.

## 2021-04-17 NOTE — PROGRESS NOTES
Patient ambulated off the unit for discharge with nursing. He had his personal possessions with him.

## 2021-04-17 NOTE — CONSULTS
[address] 81    Consultation   Date: 4/17/2021  Reason for Consultation: chest pain   Consult Requesting Physician: Kinga Hooper MD     Chief Complaint   Patient presents with    Chest Pain     HPI: Priyank Abraham is a 72 y.o. male with hepatitis C and alcoholic cirrhosis  , esophageal varices and banding . He had pressure pain on his chest, across the whole chest going to lower border on the left in the evening and could not sleep and therefore came to hospital. He still has 6/10 pain. There is tenderness over chest.    He smokes and drinks. He had a LHC in 2016 with nonobstructive disease.   negative Tn      Past Medical History:   Diagnosis Date    COPD [facility]chronic obstructive pulmonary disease) ([facility] [address]     Hepatitis C antibody positive in blood 11/16/2017    History of blood transfusion     MI (myocardial infarction) [facility])         Past Surgical History:   Procedure Laterality Date    CARDIAC CATHETERIZATION      heart cath    COLONOSCOPY  11/17/2017    Initial Colonoscopy; Multiple Colon Polyps    ENDOSCOPY, COLON, DIAGNOSTIC      EYE SURGERY      cataract removal    TN NJX DX/THER SBST INTRLMNR CRV/THRC W/IMG GDN Right 8/13/2018    RIGHT CERVICAL SEVEN THORACIC ONE EPIDURAL STEROID INJECTION SITE CONFIRMED BY FLUOROSCOPY performed by Franklyn Álvarez MD at [address]  01/18/2018    Gastritis, [place]       Allergies   Allergen Reactions    Codeine Hives       Social History:  Reviewed. reports that he has been smoking. He has a 45.00 pack-year smoking history. He has never used smokeless tobacco. He reports current alcohol use of about 5.0 standard drinks of alcohol per week. He reports that he does not use drugs. Family History:  Reviewed. family history includes Other in his mother. Review of System:  All other systems reviewed and are negative except for that noted above.  Pertinent negatives are:     · General: negative for fever, chills   · Ophthalmic ROS: negative for - eye pain or loss of vision  · ENT ROS: negative for - headaches, sore throat   · Respiratory: negative for - cough, sputum  · Cardiovascular: Reviewed in HPI  · Gastrointestinal: negative for - abdominal pain, diarrhea, N/V  · Hematology: negative for - bleeding, blood clots, bruising or jaundice  · Genito-Urinary:  negative for - Dysuria or incontinence  · Musculoskeletal: negative for - Joint swelling, muscle pain  · Neurological: negative for - confusion, dizziness, headaches   · Psychiatric: No anxiety, no depression. · Dermatological: negative for - rash    Physical Examination:  Vitals:    21 0515   BP: (!) 110/58   Pulse: 73   Resp: 16   Temp: 97 °F (36.1 °C)   SpO2: 95%      In: 180 [P.O.:180]  Out: 400    Wt Readings from Last 3 Encounters:   21 153 lb 12.8 oz (69.8 kg)   18 155 lb (70.3 kg)   18 155 lb (70.3 kg)     Temp  Av.9 °F (36.6 °C)  Min: 97 °F (36.1 °C)  Max: 98.8 °F (37.1 °C)  Pulse  Av.9  Min: 55  Max: 91  BP  Min: 95/69  Max: 130/86  SpO2  Av.6 %  Min: 92 %  Max: 99 %    Intake/Output Summary (Last 24 hours) at 2021 0819  Last data filed at 2021 2210  Gross per 24 hour   Intake 180 ml   Output 400 ml   Net -220 ml       · Telemetry: Sinus rhythm   · Constitutional: Oriented. No distress. · Head: Normocephalic and atraumatic. · Mouth/Throat: Oropharynx is clear and moist.   · Eyes: Conjunctivae normal. EOM are normal.   · Neck: Neck supple. No rigidity. No JVD present. · Cardiovascular: Normal rate, regular rhythm, S1&S2. · Pulmonary/Chest: Bilateral respiratory sounds. No wheezes, No rhonchi.  + chest wall tenderness  · Abdominal: Soft. Bowel sounds present. No distension, No tenderness. · Musculoskeletal: No tenderness. No edema    · Lymphadenopathy: Has no cervical adenopathy. · Neurological: Alert and oriented.  Cranial nerve appears intact, No Gross deficit   · Skin: Skin is warm and dry. No rash noted. · Psychiatric: Has a normal behavior     Labs, diagnostic and imaging results reviewed. Reviewed. Recent Labs     04/16/21  1104 04/17/21  0553    132*   K 3.7 3.7   CL 99 100   CO2 26 22   BUN 9 17   CREATININE 0.7* 0.7*     Recent Labs     04/16/21  1104 04/17/21  0553   WBC 3.0* 2.4*   HGB 11.3* 9.2*   HCT 35.3* 28.7*   MCV 95.2 94.5   PLT 70* 60*     Lab Results   Component Value Date    TROPONINI <0.01 04/16/2021     No results found for: BNP  Lab Results   Component Value Date    PROTIME 16.1 11/03/2020    PROTIME 13.0 07/08/2018    PROTIME 13.1 01/18/2018    INR 1.38 11/03/2020    INR 1.14 07/08/2018    INR 1.16 01/18/2018     Lab Results   Component Value Date    CHOL 153 07/09/2018    HDL 51 07/09/2018    TRIG 85 07/09/2018       ECG: Sinus rhythm with frequent Premature ventricular complexesLeft axis deviationAbnormal   Echo:2018   Conclusions      Summary   Normal left ventricular systolic function with ejection fraction of 55-60%. No regional wall motion abnormalites are seen. Grade I diastolic dysfunction with normal filling pressure. Aortic valve appears sclerotic but opens adequately. A bubble study was performed and fails to show evidence of shunting.   Cath: 2016  LM/LAD/Cx: <20%  RCA 20-30%  LVEF 55-60%       Scheduled Meds:   therapeutic multivitamin-minerals  1 tablet Oral Daily    sodium chloride flush  5-40 mL Intravenous 2 times per day    aspirin  81 mg Oral Daily    atorvastatin  40 mg Oral Nightly    pantoprazole  40 mg Intravenous Daily     Continuous Infusions:   sodium chloride       PRN Meds:.sodium chloride flush, sodium chloride, promethazine **OR** ondansetron, polyethylene glycol, nitroGLYCERIN, regadenoson, magnesium sulfate, potassium chloride **OR** potassium alternative oral replacement **OR** potassium chloride, morphine     Patient Active Problem List    Diagnosis Date Noted    Abnormal EKG     [facility]olic cirrhosis of liver without ascites ([facility] [address]     Foraminal stenosis of cervical region     Right sided weakness 07/09/2018    Cervical disc disease with myelopathy     Numbness     Elevated LFTs 11/16/2017    Hyperkalemia 11/16/2017    Rectal mass 11/16/2017    Proctitis 11/16/2017    Alcohol abuse 04/06/2016    Hypokalemia 04/06/2016    Thrombocytopenia (HCC) 04/06/2016    Macrocytosis 04/06/2016    Elevated transaminase level 04/06/2016    Coronary artery disease involving native coronary artery of native heart without angina pectoris     Chest pain 04/05/2016    Unstable angina ([facility] [address] 04/05/2016    Angina pectoris ([facility] [address]     Chronic dermatitis 09/21/2015    Open wound of finger with complication 68/95/4249      Active Hospital Problems    Diagnosis Date Noted    Chest pain [R07.9] 04/05/2016       Assessment:       Plan:    - chest pain, negative Tn     Although pain is atypical, given his Hx of CAD and risk factors including smoking, I would do a stress test to assess for any significant ischemia. If stress is normal, he can be discharged from cardiac standpoint. Lifestyle mod were discussed    - CAD   Mild on last LHC   On statin and asa   Advised to stop smoking      - smoker and ETOH use     I advised him to stop given his cirrhosis and Hx of CAD    - pancytopenia     Likely due to cirrhosis and splenomegaly      - Hyponatremia     Adequate intake    - Cirrhosis and elevated bilirubin     As per primary team       Thank you for allowing me to participate in the care of Puneet Rivera     NOTE: This report was transcribed using voice recognition software. Every effort was made to ensure accuracy, however, inadvertent computerized transcription errors may be present.

## 2021-04-17 NOTE — PROGRESS NOTES
Patient read and given his discharge instructions. He denied having any questions related to post-hospitalization or follow up.

## 2021-04-17 NOTE — PROGRESS NOTES
Spoke with Dr. Dunia Mckeon re: Lolita Forrest result. Per MD patient is ok to be discharged from cardiology standpoint. He does not need follow up with cardiology at this time.

## 2021-09-18 ENCOUNTER — APPOINTMENT (OUTPATIENT)
Dept: GENERAL RADIOLOGY | Age: 66
End: 2021-09-18
Payer: MEDICARE

## 2021-09-18 ENCOUNTER — HOSPITAL ENCOUNTER (OUTPATIENT)
Age: 66
Setting detail: OBSERVATION
Discharge: HOME | End: 2021-09-21
Attending: EMERGENCY MEDICINE | Admitting: INTERNAL MEDICINE
Payer: MEDICARE

## 2021-09-18 DIAGNOSIS — Z87.19 HISTORY OF CIRRHOSIS: ICD-10-CM

## 2021-09-18 DIAGNOSIS — Z86.19 HISTORY OF HEPATITIS C: ICD-10-CM

## 2021-09-18 DIAGNOSIS — R29.6 MULTIPLE FALLS: Primary | ICD-10-CM

## 2021-09-18 DIAGNOSIS — R55 SYNCOPE AND COLLAPSE: ICD-10-CM

## 2021-09-18 PROBLEM — R53.1 WEAKNESS: Status: ACTIVE | Noted: 2021-09-18

## 2021-09-18 LAB
A/G RATIO: 0.8 (ref 1.1–2.2)
ALBUMIN SERPL-MCNC: 3.7 G/DL (ref 3.4–5)
ALP BLD-CCNC: 104 U/L (ref 40–129)
ALT SERPL-CCNC: 19 U/L (ref 10–40)
ANION GAP SERPL CALCULATED.3IONS-SCNC: 12 MMOL/L (ref 3–16)
AST SERPL-CCNC: 63 U/L (ref 15–37)
BASOPHILS ABSOLUTE: 0 K/UL (ref 0–0.2)
BASOPHILS RELATIVE PERCENT: 2 %
BILIRUB SERPL-MCNC: 3 MG/DL (ref 0–1)
BILIRUBIN URINE: ABNORMAL
BLOOD, URINE: NEGATIVE
BUN BLDV-MCNC: 13 MG/DL (ref 7–20)
CALCIUM SERPL-MCNC: 9.1 MG/DL (ref 8.3–10.6)
CHLORIDE BLD-SCNC: 101 MMOL/L (ref 99–110)
CLARITY: CLEAR
CO2: 22 MMOL/L (ref 21–32)
COLOR: YELLOW
CREAT SERPL-MCNC: 0.6 MG/DL (ref 0.8–1.3)
EKG ATRIAL RATE: 115 BPM
EKG DIAGNOSIS: NORMAL
EKG Q-T INTERVAL: 332 MS
EKG QRS DURATION: 94 MS
EKG QTC CALCULATION (BAZETT): 459 MS
EKG R AXIS: -34 DEGREES
EKG T AXIS: 89 DEGREES
EKG VENTRICULAR RATE: 115 BPM
EOSINOPHILS ABSOLUTE: 0 K/UL (ref 0–0.6)
EOSINOPHILS RELATIVE PERCENT: 1.3 %
GFR AFRICAN AMERICAN: >60
GFR NON-AFRICAN AMERICAN: >60
GLOBULIN: 4.8 G/DL
GLUCOSE BLD-MCNC: 95 MG/DL (ref 70–99)
GLUCOSE URINE: NEGATIVE MG/DL
HCT VFR BLD CALC: 27.3 % (ref 40.5–52.5)
HEMOGLOBIN: 8.5 G/DL (ref 13.5–17.5)
INFLUENZA A: NOT DETECTED
INFLUENZA B: NOT DETECTED
INR BLD: 1.57 (ref 0.88–1.12)
KETONES, URINE: ABNORMAL MG/DL
LEUKOCYTE ESTERASE, URINE: NEGATIVE
LIPASE: 30 U/L (ref 13–60)
LYMPHOCYTES ABSOLUTE: 0.5 K/UL (ref 1–5.1)
LYMPHOCYTES RELATIVE PERCENT: 19.5 %
MCH RBC QN AUTO: 26.2 PG (ref 26–34)
MCHC RBC AUTO-ENTMCNC: 31 G/DL (ref 31–36)
MCV RBC AUTO: 84.7 FL (ref 80–100)
MICROSCOPIC EXAMINATION: ABNORMAL
MONOCYTES ABSOLUTE: 0.3 K/UL (ref 0–1.3)
MONOCYTES RELATIVE PERCENT: 13.5 %
NEUTROPHILS ABSOLUTE: 1.5 K/UL (ref 1.7–7.7)
NEUTROPHILS RELATIVE PERCENT: 63.7 %
NITRITE, URINE: NEGATIVE
PDW BLD-RTO: 22.4 % (ref 12.4–15.4)
PH UA: 8 (ref 5–8)
PLATELET # BLD: 54 K/UL (ref 135–450)
PMV BLD AUTO: 8.5 FL (ref 5–10.5)
POTASSIUM REFLEX MAGNESIUM: 4 MMOL/L (ref 3.5–5.1)
PROTEIN UA: NEGATIVE MG/DL
PROTHROMBIN TIME: 18.1 SEC (ref 9.9–12.7)
RBC # BLD: 3.22 M/UL (ref 4.2–5.9)
SARS-COV-2 RNA, RT PCR: NOT DETECTED
SODIUM BLD-SCNC: 135 MMOL/L (ref 136–145)
SPECIFIC GRAVITY UA: 1.01 (ref 1–1.03)
TOTAL PROTEIN: 8.5 G/DL (ref 6.4–8.2)
TROPONIN: <0.01 NG/ML
URINE TYPE: ABNORMAL
UROBILINOGEN, URINE: 4 E.U./DL
WBC # BLD: 2.4 K/UL (ref 4–11)

## 2021-09-18 PROCEDURE — 87636 SARSCOV2 & INF A&B AMP PRB: CPT

## 2021-09-18 PROCEDURE — 73502 X-RAY EXAM HIP UNI 2-3 VIEWS: CPT

## 2021-09-18 PROCEDURE — G0378 HOSPITAL OBSERVATION PER HR: HCPCS

## 2021-09-18 PROCEDURE — 80053 COMPREHEN METABOLIC PANEL: CPT

## 2021-09-18 PROCEDURE — 71045 X-RAY EXAM CHEST 1 VIEW: CPT

## 2021-09-18 PROCEDURE — 96375 TX/PRO/DX INJ NEW DRUG ADDON: CPT

## 2021-09-18 PROCEDURE — 96374 THER/PROPH/DIAG INJ IV PUSH: CPT

## 2021-09-18 PROCEDURE — 93010 ELECTROCARDIOGRAM REPORT: CPT | Performed by: INTERNAL MEDICINE

## 2021-09-18 PROCEDURE — 84484 ASSAY OF TROPONIN QUANT: CPT

## 2021-09-18 PROCEDURE — 96361 HYDRATE IV INFUSION ADD-ON: CPT

## 2021-09-18 PROCEDURE — 6370000000 HC RX 637 (ALT 250 FOR IP): Performed by: INTERNAL MEDICINE

## 2021-09-18 PROCEDURE — 85610 PROTHROMBIN TIME: CPT

## 2021-09-18 PROCEDURE — 83690 ASSAY OF LIPASE: CPT

## 2021-09-18 PROCEDURE — 93005 ELECTROCARDIOGRAM TRACING: CPT | Performed by: EMERGENCY MEDICINE

## 2021-09-18 PROCEDURE — 2580000003 HC RX 258: Performed by: STUDENT IN AN ORGANIZED HEALTH CARE EDUCATION/TRAINING PROGRAM

## 2021-09-18 PROCEDURE — 81003 URINALYSIS AUTO W/O SCOPE: CPT

## 2021-09-18 PROCEDURE — 73560 X-RAY EXAM OF KNEE 1 OR 2: CPT

## 2021-09-18 PROCEDURE — 73610 X-RAY EXAM OF ANKLE: CPT

## 2021-09-18 PROCEDURE — 99284 EMERGENCY DEPT VISIT MOD MDM: CPT

## 2021-09-18 PROCEDURE — 6360000002 HC RX W HCPCS: Performed by: STUDENT IN AN ORGANIZED HEALTH CARE EDUCATION/TRAINING PROGRAM

## 2021-09-18 PROCEDURE — 85025 COMPLETE CBC W/AUTO DIFF WBC: CPT

## 2021-09-18 RX ORDER — PANTOPRAZOLE SODIUM 40 MG/1
40 TABLET, DELAYED RELEASE ORAL
Status: DISCONTINUED | OUTPATIENT
Start: 2021-09-19 | End: 2021-09-21 | Stop reason: HOSPADM

## 2021-09-18 RX ORDER — ACETAMINOPHEN 650 MG/1
650 SUPPOSITORY RECTAL EVERY 6 HOURS PRN
Status: DISCONTINUED | OUTPATIENT
Start: 2021-09-18 | End: 2021-09-18

## 2021-09-18 RX ORDER — ACETAMINOPHEN 325 MG/1
650 TABLET ORAL EVERY 6 HOURS PRN
Status: DISCONTINUED | OUTPATIENT
Start: 2021-09-18 | End: 2021-09-18

## 2021-09-18 RX ORDER — SODIUM CHLORIDE 9 MG/ML
25 INJECTION, SOLUTION INTRAVENOUS PRN
Status: DISCONTINUED | OUTPATIENT
Start: 2021-09-18 | End: 2021-09-21 | Stop reason: HOSPADM

## 2021-09-18 RX ORDER — SODIUM CHLORIDE 0.9 % (FLUSH) 0.9 %
5-40 SYRINGE (ML) INJECTION PRN
Status: DISCONTINUED | OUTPATIENT
Start: 2021-09-18 | End: 2021-09-21 | Stop reason: HOSPADM

## 2021-09-18 RX ORDER — ONDANSETRON 4 MG/1
4 TABLET, ORALLY DISINTEGRATING ORAL EVERY 8 HOURS PRN
Status: DISCONTINUED | OUTPATIENT
Start: 2021-09-18 | End: 2021-09-21 | Stop reason: HOSPADM

## 2021-09-18 RX ORDER — ONDANSETRON 2 MG/ML
4 INJECTION INTRAMUSCULAR; INTRAVENOUS ONCE
Status: COMPLETED | OUTPATIENT
Start: 2021-09-18 | End: 2021-09-18

## 2021-09-18 RX ORDER — ONDANSETRON 2 MG/ML
4 INJECTION INTRAMUSCULAR; INTRAVENOUS EVERY 6 HOURS PRN
Status: DISCONTINUED | OUTPATIENT
Start: 2021-09-18 | End: 2021-09-21 | Stop reason: HOSPADM

## 2021-09-18 RX ORDER — LEVETIRACETAM 500 MG/1
500 TABLET ORAL 2 TIMES DAILY
Status: DISCONTINUED | OUTPATIENT
Start: 2021-09-18 | End: 2021-09-19

## 2021-09-18 RX ORDER — 0.9 % SODIUM CHLORIDE 0.9 %
500 INTRAVENOUS SOLUTION INTRAVENOUS ONCE
Status: COMPLETED | OUTPATIENT
Start: 2021-09-18 | End: 2021-09-18

## 2021-09-18 RX ORDER — FENTANYL CITRATE 50 UG/ML
25 INJECTION, SOLUTION INTRAMUSCULAR; INTRAVENOUS ONCE
Status: COMPLETED | OUTPATIENT
Start: 2021-09-18 | End: 2021-09-18

## 2021-09-18 RX ORDER — IBUPROFEN 400 MG/1
400 TABLET ORAL EVERY 6 HOURS PRN
Status: DISCONTINUED | OUTPATIENT
Start: 2021-09-18 | End: 2021-09-21 | Stop reason: HOSPADM

## 2021-09-18 RX ORDER — SODIUM CHLORIDE 0.9 % (FLUSH) 0.9 %
5-40 SYRINGE (ML) INJECTION EVERY 12 HOURS SCHEDULED
Status: DISCONTINUED | OUTPATIENT
Start: 2021-09-18 | End: 2021-09-21 | Stop reason: HOSPADM

## 2021-09-18 RX ORDER — POLYETHYLENE GLYCOL 3350 17 G/17G
17 POWDER, FOR SOLUTION ORAL DAILY PRN
Status: DISCONTINUED | OUTPATIENT
Start: 2021-09-18 | End: 2021-09-21 | Stop reason: HOSPADM

## 2021-09-18 RX ADMIN — FENTANYL CITRATE 25 MCG: 50 INJECTION, SOLUTION INTRAMUSCULAR; INTRAVENOUS at 17:14

## 2021-09-18 RX ADMIN — ONDANSETRON HYDROCHLORIDE 4 MG: 2 INJECTION, SOLUTION INTRAMUSCULAR; INTRAVENOUS at 17:14

## 2021-09-18 RX ADMIN — SODIUM CHLORIDE 500 ML: 9 INJECTION, SOLUTION INTRAVENOUS at 15:49

## 2021-09-18 RX ADMIN — IBUPROFEN 400 MG: 400 TABLET, FILM COATED ORAL at 22:17

## 2021-09-18 ASSESSMENT — PAIN SCALES - GENERAL
PAINLEVEL_OUTOF10: 9
PAINLEVEL_OUTOF10: 5
PAINLEVEL_OUTOF10: 5

## 2021-09-18 ASSESSMENT — PAIN DESCRIPTION - LOCATION: LOCATION: LEG;HIP

## 2021-09-18 ASSESSMENT — PAIN DESCRIPTION - ONSET: ONSET: ON-GOING

## 2021-09-18 ASSESSMENT — PAIN DESCRIPTION - ORIENTATION: ORIENTATION: RIGHT

## 2021-09-18 ASSESSMENT — PAIN DESCRIPTION - DESCRIPTORS: DESCRIPTORS: SORE

## 2021-09-18 ASSESSMENT — PAIN DESCRIPTION - PROGRESSION: CLINICAL_PROGRESSION: NOT CHANGED

## 2021-09-18 ASSESSMENT — PAIN DESCRIPTION - FREQUENCY: FREQUENCY: CONTINUOUS

## 2021-09-18 ASSESSMENT — PAIN DESCRIPTION - PAIN TYPE: TYPE: ACUTE PAIN

## 2021-09-18 NOTE — ED PROVIDER NOTES
As physician-in-triage, I performed a medical screening history and physical exam on this patient. HISTORY OF PRESENT ILLNESS  Larissa Muro is a 77 y.o. male who states that he has had episodes of dizziness and near syncope for nearly a year since starting on nadolol. Patient states this has caused him to fall frequently. PCP took him off nadolol a few weeks ago however he still is falling. Patient is now complaining of right leg pain from hip to ankle from a fall two days ago. PHYSICAL EXAM  /89   Pulse 107   Temp 98.3 °F (36.8 °C) (Oral)   Resp 17     On exam, patient is resting comfortably. CTAB. Tachycardic. No murmur. AOx3. Please see NIHSS documented elsewhere as 0. Patient discussed with Dr. Satinder Stephenson who will follow through on workup. Comment: Please note this report has been produced using speech recognition software and may contain errors related to that system including errors in grammar, punctuation, and spelling, as well as words and phrases that may be inappropriate. If there are any questions or concerns please feel free to contact the dictating provider for clarification.         Bailee Mena MD  09/18/21 [id number]

## 2021-09-18 NOTE — ED NOTES
Medicine returned call for admission, discussing care with Dr. Leah Dunn @ this time     Sarah Trevino RN  09/18/21

## 2021-09-18 NOTE — ED PROVIDER NOTES
[address] ED      CHIEF COMPLAINT  Fall (Pt states he has had multiple dizzy spells and falls d/t script of Nadolol 20mg. Pt no longer taking, c/o right leg and hip pain from fall earlier this week. )     HISTORY OF PRESENT ILLNESS  Yfn Delgado is a 77 y.o. male  who presents to the ED complaining of episodes of lightheadedness, one episode of syncope. Patient states that he has been having multiple dizzy spells and falls over the past year, does have a history of alcoholic cirrhosis and hepatitis C, was previously taking nadolol. His primary care provider took him off of the nadolol, he states that since then he has had decreased frequency of falls but is still having them for the most part a few times a week. He denies chest pain or shortness of breath associated with the symptoms. He states that he really just feels lightheaded, not a room spinning sensation. He states that he has pain in his right hip traveling down to the bottom of his right leg since a fall that occurred recently and that is really what brought him into the ED today. He has not been taking anything at home for the pain. He denies head trauma. He denies other complaints or concerns. No other complaints, modifying factors or associated symptoms. I have reviewed the following from the nursing documentation.     Past Medical History:   Diagnosis Date    COPD (chronic obstructive pulmonary disease) ([facility] [address]     Hepatitis C antibody positive in blood 11/16/2017    History of blood transfusion     MI ([facility] infarction[facility])      Past Surgical History:   Procedure Laterality Date    CARDIAC CATHETERIZATION      heart cath    COLONOSCOPY  11/17/2017    Initial Colonoscopy; Multiple Colon Polyps    ENDOSCOPY, COLON, DIAGNOSTIC      EYE SURGERY      cataract removal    DE NJX DX/THER SBST INTRLMNR CRV/THRC W/IMG GDN Right 8/13/2018    RIGHT CERVICAL SEVEN THORACIC ONE EPIDURAL STEROID INJECTION SITE CONFIRMED BY Dispense Refill    pantoprazole sodium (PROTONIX) 40 MG PACK packet Take 40 mg by mouth every morning (before breakfast)      levETIRAcetam (KEPPRA) 500 MG tablet Take 500 mg by mouth 2 times daily Started 2/4/21 supposed to be on but not taking it due to concerns about dizziness. Started having seizures over past year possibly related to intermittent alcohol abuse  - stopping and starting back.  ChlordiazePOXIDE HCl (LIBRIUM PO) Take by mouth      Multiple Vitamins-Minerals (THERAPEUTIC MULTIVITAMIN-MINERALS) tablet Take 1 tablet by mouth daily 30 tablet 0     Allergies   Allergen Reactions    Codeine Hives       REVIEW OF SYSTEMS  10 systems reviewed, pertinent positives per HPI otherwise noted to be negative. PHYSICAL EXAM  BP (!) 159/98   Pulse 113   Temp 98.3 °F (36.8 °C) (Oral)   Resp 19   SpO2 95%    GENERAL APPEARANCE: Awake and alert. Cooperative. No acute distress. HENT: Normocephalic. Atraumatic. NECK: Supple. EYES: PERRL. EOM's grossly intact. HEART/CHEST: Tachycardic and irregular. No murmurs. LUNGS: Respirations unlabored. CTAB. Good air exchange. Speaking comfortably in full sentences. ABDOMEN: Distended, mild diffuse tenderness palpation. No masses. No organomegaly. No guarding or rebound. MUSCULOSKELETAL: No extremity edema. Compartments soft. No deformity. Tender to palpation over the right hip and right knee. All extremities neurovascularly intact. SKIN: Warm and dry. No acute rashes. NEUROLOGICAL: Alert and oriented. CN's 2-12 intact. No gross facial drooping. Strength 5/5, sensation intact. Gait normal.  PSYCHIATRIC: Normal mood and affect. LABS  I have reviewed all labs for this visit.    Results for orders placed or performed during the hospital encounter of 09/18/21   COVID-19 & Influenza Combo    Specimen: Nasopharyngeal Swab; Nose   Result Value Ref Range    SARS-CoV-2 RNA, RT PCR NOT DETECTED NOT DETECTED    INFLUENZA A NOT DETECTED NOT DETECTED INFLUENZA B NOT DETECTED NOT DETECTED   CBC Auto Differential   Result Value Ref Range    WBC 2.4 (L) 4.0 - 11.0 K/uL    RBC 3.22 (L) 4.20 - 5.90 M/uL    Hemoglobin 8.5 (L) 13.5 - 17.5 g/dL    Hematocrit 27.3 (L) 40.5 - 52.5 %    MCV 84.7 80.0 - 100.0 fL    MCH 26.2 26.0 - 34.0 pg    MCHC 31.0 31.0 - 36.0 g/dL    RDW 22.4 (H) 12.4 - 15.4 %    Platelets 54 (L) 929 - 450 K/uL    MPV 8.5 5.0 - 10.5 fL    Neutrophils % 63.7 %    Lymphocytes % 19.5 %    Monocytes % 13.5 %    Eosinophils % 1.3 %    Basophils % 2.0 %    Neutrophils Absolute 1.5 (L) 1.7 - 7.7 K/uL    Lymphocytes Absolute 0.5 (L) 1.0 - 5.1 K/uL    Monocytes Absolute 0.3 0.0 - 1.3 K/uL    Eosinophils Absolute 0.0 0.0 - 0.6 K/uL    Basophils Absolute 0.0 0.0 - 0.2 K/uL   Comprehensive Metabolic Panel w/ Reflex to MG   Result Value Ref Range    Sodium 135 (L) 136 - 145 mmol/L    Potassium reflex Magnesium 4.0 3.5 - 5.1 mmol/L    Chloride 101 99 - 110 mmol/L    CO2 22 21 - 32 mmol/L    Anion Gap 12 3 - 16    Glucose 95 70 - 99 mg/dL    BUN 13 7 - 20 mg/dL    CREATININE 0.6 (L) 0.8 - 1.3 mg/dL    GFR Non-African American >60 >60    GFR African American >60 >60    Calcium 9.1 8.3 - 10.6 mg/dL    Total Protein 8.5 (H) 6.4 - 8.2 g/dL    Albumin 3.7 3.4 - 5.0 g/dL    Albumin/Globulin Ratio 0.8 (L) 1.1 - 2.2    Total Bilirubin 3.0 (H) 0.0 - 1.0 mg/dL    Alkaline Phosphatase 104 40 - 129 U/L    ALT 19 10 - 40 U/L    AST 63 (H) 15 - 37 U/L    Globulin 4.8 g/dL   Lipase   Result Value Ref Range    Lipase 30.0 13.0 - 60.0 U/L   Troponin   Result Value Ref Range    Troponin <0.01 <0.01 ng/mL   Protime-INR   Result Value Ref Range    Protime 18.1 (H) 9.9 - 12.7 sec    INR 1.57 (H) 0.88 - 1.12   Urinalysis, reflex to microscopic   Result Value Ref Range    Color, UA Yellow Straw/Yellow    Clarity, UA Clear Clear    Glucose, Ur Negative Negative mg/dL    Bilirubin Urine SMALL (A) Negative    Ketones, Urine TRACE (A) Negative mg/dL    Specific Gravity, UA 1.015 1.005 - 1.030 Blood, Urine Negative Negative    pH, UA 8.0 5.0 - 8.0    Protein, UA Negative Negative mg/dL    Urobilinogen, Urine 4.0 (A) <2.0 E.U./dL    Nitrite, Urine Negative Negative    Leukocyte Esterase, Urine Negative Negative    Microscopic Examination Not Indicated     Urine Type NotGiven    EKG 12 Lead   Result Value Ref Range    Ventricular Rate 115 BPM    Atrial Rate 115 BPM    QRS Duration 94 ms    Q-T Interval 332 ms    QTc Calculation (Bazett) 459 ms    R Axis -34 degrees    T Axis 89 degrees    Diagnosis       sinus tachycardia PVC's isolated couplets and salvos. Left axis deviationNonspecific ST and T wave abnormalityAbnormal ECGWhen compared with ECG of 18-SEP-2021 15:11, (unconfirmed)Current undetermined rhythm precludes rhythm comparison, needs reviewConfirmed by Kira Price MD, [address] ([id number]) on 9/18/2021 4:36:38 PM       ECG  The Ekg interpreted by me shows  normal sinus rhythm with a rate of 115  Axis is   Left axis deviation  QTc is  prolonged at 459  Intervals and Durations are unremarkable. ST Segments: nonspecific changes  Frequent PVCs  PVCs increased, otherwise similar compared to previous performed 04/17/2021    RADIOLOGY  XR ANKLE RIGHT (MIN 3 VIEWS)   Final Result   No acute bony or joint abnormalities seen in the pelvis, right hip, right   ankle or right knee         XR KNEE RIGHT (1-2 VIEWS)   Final Result   No acute bony or joint abnormalities seen in the pelvis, right hip, right   ankle or right knee         XR HIP RIGHT (2-3 VIEWS)   Final Result   No acute bony or joint abnormalities seen in the pelvis, right hip, right   ankle or right knee         XR CHEST PORTABLE   Final Result   Negative chest radiograph. ED COURSE/MDM  Patient seen and evaluated. Old records reviewed. Labs and imaging reviewed and results discussed with patient.       Patient is a 60-year-old male, with history of alcoholic cirrhosis and hepatitis C, presenting with concerns for multiple falls at home, subsequent right leg pain. Full HPI as detailed above. Upon arrival in the ED, vitals remarkable for tachycardia, EKG without evidence of acute ischemic changes, does have very frequent PVCs. Treated with 500 mL fluid bolus, did remain tachycardic. X-rays of the right hip, knee, and ankle performed and were negative for any acute findings. Chest x-ray negative as well. Patient with chronic anemia and thrombocytopenia on CBC which has slightly worsened compared to previous. Troponin negative. UA negative for infection. Covid and influenza are negative as well. Given the patient's multiple falls, as well as one episode of syncope, and chronic illness, feel that he would benefit from hospitalization for further work-up and treatment of his condition. Findings were discussed with the patient was comfortable agreement with plan of care. During the patient's ED course, the patient was given:  Medications   0.9 % sodium chloride bolus (0 mLs IntraVENous Stopped 9/18/21 1714)   fentaNYL (SUBLIMAZE) injection 25 mcg (25 mcg IntraVENous Given 9/18/21 1714)   ondansetron (ZOFRAN) injection 4 mg (4 mg IntraVENous Given 9/18/21 1714)        CLINICAL IMPRESSION  1. Multiple falls    2. Syncope and collapse    3. History of hepatitis C    4. History of cirrhosis        Blood pressure (!) 159/98, pulse 113, temperature 98.3 °F (36.8 °C), temperature source Oral, resp. rate 19, SpO2 95 %. DISPOSITION  Puneet Rivera was admitted in stable condition. Patient was given scripts for the following medications. I counseled patient how to take these medications. New Prescriptions    No medications on file       Follow-up with:  No follow-up provider specified. DISCLAIMER: This chart was created using Dragon dictation software. Efforts were made by me to ensure accuracy, however some errors may be present due to limitations of this technology and occasionally words are not transcribed correctly.        Frankey Fusi E Pako Mehta MD  09/18/21 0639

## 2021-09-19 LAB
A/G RATIO: 0.7 (ref 1.1–2.2)
ALBUMIN SERPL-MCNC: 3.2 G/DL (ref 3.4–5)
ALP BLD-CCNC: 124 U/L (ref 40–129)
ALT SERPL-CCNC: 13 U/L (ref 10–40)
ANION GAP SERPL CALCULATED.3IONS-SCNC: 8 MMOL/L (ref 3–16)
ANISOCYTOSIS: ABNORMAL
AST SERPL-CCNC: 38 U/L (ref 15–37)
BASOPHILS ABSOLUTE: 0 K/UL (ref 0–0.2)
BASOPHILS RELATIVE PERCENT: 1 %
BILIRUB SERPL-MCNC: 1.9 MG/DL (ref 0–1)
BUN BLDV-MCNC: 22 MG/DL (ref 7–20)
CALCIUM SERPL-MCNC: 8.3 MG/DL (ref 8.3–10.6)
CHLORIDE BLD-SCNC: 101 MMOL/L (ref 99–110)
CO2: 23 MMOL/L (ref 21–32)
CREAT SERPL-MCNC: 0.8 MG/DL (ref 0.8–1.3)
EOSINOPHILS ABSOLUTE: 0 K/UL (ref 0–0.6)
EOSINOPHILS RELATIVE PERCENT: 2 %
GFR AFRICAN AMERICAN: >60
GFR NON-AFRICAN AMERICAN: >60
GLOBULIN: 4.5 G/DL
GLUCOSE BLD-MCNC: 115 MG/DL (ref 70–99)
HCT VFR BLD CALC: 23.9 % (ref 40.5–52.5)
HEMATOLOGY PATH CONSULT: YES
HEMOGLOBIN: 7.4 G/DL (ref 13.5–17.5)
LYMPHOCYTES ABSOLUTE: 0.8 K/UL (ref 1–5.1)
LYMPHOCYTES RELATIVE PERCENT: 45 %
MCH RBC QN AUTO: 26.6 PG (ref 26–34)
MCHC RBC AUTO-ENTMCNC: 30.9 G/DL (ref 31–36)
MCV RBC AUTO: 86.1 FL (ref 80–100)
MONOCYTES ABSOLUTE: 0.1 K/UL (ref 0–1.3)
MONOCYTES RELATIVE PERCENT: 8 %
NEUTROPHILS ABSOLUTE: 0.7 K/UL (ref 1.7–7.7)
NEUTROPHILS RELATIVE PERCENT: 44 %
PDW BLD-RTO: 22 % (ref 12.4–15.4)
PLATELET # BLD: 48 K/UL (ref 135–450)
PLATELET SLIDE REVIEW: ABNORMAL
PMV BLD AUTO: 8.5 FL (ref 5–10.5)
POLYCHROMASIA: ABNORMAL
POTASSIUM REFLEX MAGNESIUM: 4 MMOL/L (ref 3.5–5.1)
RBC # BLD: 2.77 M/UL (ref 4.2–5.9)
SLIDE REVIEW: ABNORMAL
SODIUM BLD-SCNC: 132 MMOL/L (ref 136–145)
TARGET CELLS: ABNORMAL
TOTAL PROTEIN: 7.7 G/DL (ref 6.4–8.2)
WBC # BLD: 1.7 K/UL (ref 4–11)

## 2021-09-19 PROCEDURE — 96375 TX/PRO/DX INJ NEW DRUG ADDON: CPT

## 2021-09-19 PROCEDURE — 2580000003 HC RX 258: Performed by: INTERNAL MEDICINE

## 2021-09-19 PROCEDURE — 6360000002 HC RX W HCPCS: Performed by: INTERNAL MEDICINE

## 2021-09-19 PROCEDURE — G0378 HOSPITAL OBSERVATION PER HR: HCPCS

## 2021-09-19 PROCEDURE — 2580000003 HC RX 258: Performed by: PHYSICIAN ASSISTANT

## 2021-09-19 PROCEDURE — 85025 COMPLETE CBC W/AUTO DIFF WBC: CPT

## 2021-09-19 PROCEDURE — 97165 OT EVAL LOW COMPLEX 30 MIN: CPT

## 2021-09-19 PROCEDURE — 97116 GAIT TRAINING THERAPY: CPT

## 2021-09-19 PROCEDURE — 6370000000 HC RX 637 (ALT 250 FOR IP): Performed by: PHYSICIAN ASSISTANT

## 2021-09-19 PROCEDURE — 80053 COMPREHEN METABOLIC PANEL: CPT

## 2021-09-19 PROCEDURE — 97162 PT EVAL MOD COMPLEX 30 MIN: CPT

## 2021-09-19 PROCEDURE — 36415 COLL VENOUS BLD VENIPUNCTURE: CPT

## 2021-09-19 PROCEDURE — 99223 1ST HOSP IP/OBS HIGH 75: CPT | Performed by: PHYSICIAN ASSISTANT

## 2021-09-19 PROCEDURE — 6370000000 HC RX 637 (ALT 250 FOR IP): Performed by: INTERNAL MEDICINE

## 2021-09-19 RX ORDER — TRAMADOL HYDROCHLORIDE 50 MG/1
50 TABLET ORAL EVERY 6 HOURS PRN
Status: DISCONTINUED | OUTPATIENT
Start: 2021-09-19 | End: 2021-09-21 | Stop reason: HOSPADM

## 2021-09-19 RX ORDER — KETOROLAC TROMETHAMINE 30 MG/ML
15 INJECTION, SOLUTION INTRAMUSCULAR; INTRAVENOUS ONCE
Status: COMPLETED | OUTPATIENT
Start: 2021-09-19 | End: 2021-09-19

## 2021-09-19 RX ORDER — SODIUM CHLORIDE 9 MG/ML
INJECTION, SOLUTION INTRAVENOUS CONTINUOUS
Status: DISCONTINUED | OUTPATIENT
Start: 2021-09-19 | End: 2021-09-20

## 2021-09-19 RX ORDER — LIDOCAINE 4 G/G
1 PATCH TOPICAL DAILY
Status: DISCONTINUED | OUTPATIENT
Start: 2021-09-19 | End: 2021-09-21 | Stop reason: HOSPADM

## 2021-09-19 RX ADMIN — TRAMADOL HYDROCHLORIDE 50 MG: 50 TABLET, FILM COATED ORAL at 18:32

## 2021-09-19 RX ADMIN — SODIUM CHLORIDE, PRESERVATIVE FREE 10 ML: 5 INJECTION INTRAVENOUS at 08:15

## 2021-09-19 RX ADMIN — TRAMADOL HYDROCHLORIDE 50 MG: 50 TABLET, FILM COATED ORAL at 12:17

## 2021-09-19 RX ADMIN — LEVETIRACETAM 500 MG: 500 TABLET ORAL at 08:13

## 2021-09-19 RX ADMIN — PANTOPRAZOLE SODIUM 40 MG: 40 TABLET, DELAYED RELEASE ORAL at 08:13

## 2021-09-19 RX ADMIN — KETOROLAC TROMETHAMINE 15 MG: 30 INJECTION, SOLUTION INTRAMUSCULAR; INTRAVENOUS at 02:17

## 2021-09-19 RX ADMIN — SODIUM CHLORIDE: 9 INJECTION, SOLUTION INTRAVENOUS at 13:34

## 2021-09-19 ASSESSMENT — PAIN SCALES - GENERAL
PAINLEVEL_OUTOF10: 8
PAINLEVEL_OUTOF10: 7
PAINLEVEL_OUTOF10: 8
PAINLEVEL_OUTOF10: 7
PAINLEVEL_OUTOF10: 8
PAINLEVEL_OUTOF10: 8

## 2021-09-19 NOTE — ED NOTES
Pt is requesting additional pain medication. States that the ibuprofen is not looking. Pt does appear to be uncomfortable and in pain.  Will contact hospitalist for additional orders     Noé Patel RN  09/19/21 1254

## 2021-09-19 NOTE — PROGRESS NOTES
Inpatient Occupational Therapy  Evaluation and Treatment  Discharge Summary    Unit: ED  Date:  9/19/2021  Patient Name:    Nathan Macias  Admitting diagnosis:  Weakness [R53.1]  Admit Date:  9/18/2021  Precautions/Restrictions/WB Status/ Lines/ Wounds/ Oxygen: fall risk, IV, bed/chair alarm, telemetry and continuous pulse ox    Treatment Time:  900-930  (OBSERVATION)   Treatment Number: 1     Billable Treatment Time: 5 minutes   Total Treatment Time:   10   minutes    Patient Goals for Therapy:  \" to go home \"      Discharge Recommendations: Home with PRN assistance   DME needs for discharge: needs met (Pt declines need for walker)        Therapy recommendations for staff:  Assist of 1 with use of gait belt for all ambulation within halls    History of Present Illness: Ed note, 9/18/2021, Zaria Pal:   \" 77 y.o. male  who presents to the ED complaining of episodes of lightheadedness, one episode of syncope. Patient states that he has been having multiple dizzy spells and falls over the past year, does have a history of alcoholic cirrhosis and hepatitis C, was previously taking nadolol. His primary care provider took him off of the nadolol, he states that since then he has had decreased frequency of falls but is still having them for the most part a few times a week. He denies chest pain or shortness of breath associated with the symptoms. He states that he really just feels lightheaded, not a room spinning sensation. He states that he has pain in his right hip traveling down to the bottom of his right leg since a fall that occurred recently and that is really what brought him into the ED today. He has not been taking anything at home for the pain. He denies head trauma. He denies other complaints or concerns. \"     Home Health S4 Level Recommendation:  NA    AM-PAC Score: AM-PAC Inpatient Daily Activity Raw Score: 22  Pt scored a 22/24 on the AM-PAC ADL Inpatient form.  Current research shows that an AM-PAC score of 18 or greater is associated with a discharge to the patient's home setting. Preadmission Environment:    Pt. Lives with spouse. Pt does not have access to  assistance. Home environment:  apartment   Steps to enter first floor:  No steps    Steps to second floor:  N/A  Bathroom: tub/shower combo, grab bars (1 in the shower and 1 outside the shower)  and standard toilet  Equipment owned:  [facility])    Preadmission Status / PLOF:  History of falls   Yes  Pt. Able to drive   Yes  Pt Fully independent with ADL's  Yes  Pt. Required assistance from family for:  Laundry     Pt sleeps in flat bed. Pt. Fully independent for transfers and gait and walked with: No Device    Pain:  Yes  Ratin  Location:  R hip   Pain Medicine Status:  No request made      Cognition:    A&O Person, Place, Time and Situation   Able to follow 2 step commands, consistently. Subjective:  Pt supine in bed upon therapist arrival. Pt agreeable to work with therapy this date. Upper Extremity ROM:   WFL,  pt able to perform all bed mobility, transfers, and gait without ROM limitation. Upper Extremity Strength:    BUE strength WFL, but not formally assessed w/ MMT    Upper Extremity Sensation:    WNL    Upper Extremity Proprioception:  WNL    Coordination and Tone:  WNL    Balance:  Functional Sitting Balance: WNL   Comments: Good at EOB   Functional Standing Balance:WFL   Comments: Good -     Bed mobility:    Supine to sit: Independent  Sit to supine:   Independent  Rolling:    Independent  Scooting in sitting:  Independent  Scooting to head of bed: Independent   Bridging:   Yes    Transfers:    Sit to stand:  CGA  Stand to sit:  CGA  Bed to chair:   [facility]  Standard toilet: Not Tested  Bed to [facility]:  Not Tested   Pt completed functional mobility of household distance in preparation for standing ADL/IADLs this date, CGA.      Activities of Daily Living:   UB Dressing:   Not Tested  LB Dressing:    SBA  UB Bathing:  Not Tested  LB Bathing:  Not Tested  Feeding:  Independent for ice chips   Grooming:   supervision  Toileting:  Not Tested    Activity Tolerance:   Pt completed therapy session with No adverse symptoms noted w/activity   BP (mmHg) HR (bpm) SpO2 (%) Comments   Supine, at rest 117/71 97 96 On room air     Positioning Needs: In bed, call light and needs in reach. Exercise / Activities Initiated:   NT    Patient/Family Education:   Role of OT  Recommendations for DC    Assessment of Deficits:   Pt demonstrated decreased activity tolerance related to pain, however, not limiting completion of daily needs. Pt is not in need of acute skilled occupational therapy services at this time. At end of evaluation, pt. In bed with call light and needs within reach. RN notified. Pt discharged from acute skilled occupational therapy. No goals set. No plan of care established. Please reorder if pt status changes. Thank you.      CELIA Castellon, OTR/L   [id number]           If patient discharges from this facility prior to next visit, this note will serve as the Discharge Summary

## 2021-09-19 NOTE — FLOWSHEET NOTE
09/19/21 1215   Vital Signs   Temp 98.1 °F (36.7 °C)   Temp Source Oral   Heart Rate Source Monitor   Resp 16   /78   BP Location Left upper arm   Patient Position Semi fowlers   Level of Consciousness Alert (0)   Patient Currently in Pain Yes   Pain Assessment   Pain Assessment 0-10   Pain Level 8   Oxygen Therapy   SpO2 98 %   O2 Device None (Room air)       Patient transported to  room 223 via wheel chair, fall & seizure precautions in place. Dietary notified for a tray.  PRN pain medication given; See [facility]

## 2021-09-19 NOTE — PROGRESS NOTES
Patient scored high on the betts fall risk scale. Interventions taken; verified bed/chair alarm is activated, yellow socks placed on patient, stay with me sign posted outside patients room, as well as yellow fall bracelet placed on patient.

## 2021-09-19 NOTE — PROGRESS NOTES
Pt requested writer to roll windows up on truck. Writer called security and security fulfilled request. Kiko Centeno returned to patient.

## 2021-09-19 NOTE — H&P
Hospital Medicine History & Physical      PCP: Referring Not In System (Inactive)    Date of Admission: 9/18/2021    Date of Service: Pt seen/examined on 9/19/2021    Chief Complaint:    Chief Complaint   Patient presents with    Fall     Pt states he has had multiple dizzy spells and falls d/t script of Nadolol 20mg. Pt no longer taking, c/o right leg and hip pain from fall earlier this week. History Of Present Illness: The patient is a 77 y.o. male with a PMH of Alcoholic Cirrhosis of the liver, Hepatitis C, Chronic TCP, H/o esophageal varices, Seizure DO and COPD who presented to [facility] ED with complaint of right hip pain following a fall that occurred about a week ago. He reports that he fell because he was feeling dizzy. He landed on his right hip. He did not hit his head or lose consciousness. States he has a hx of recurrent falls over the last couple of months. Previously he was on Naldolol and this was discontinued d/t recurrent falls. He states this seemed to help quite a bit but he still has the occasional dizzy spell. No room spinning sensation or hx of vertigo. He does note that dizziness tends to come on after he has changed position from sitting to standing usually once he starts walking. He does have a hx of alcohol abuse but states he was not drinking when his symptoms came on. His last drink was last Sunday. Has occasional chest pain and SOB. Denies N/V/D, melena or hematochezia. Reported last week he did have an episode of hematemesis that has since resolved. Emesis was bright red in color. He does not currently take any medications. No seizure like activity or focal weakness. No hx of arrhthymias. No palpitations. Work up in the ED: Pt is afebrile, BPs stable. Cr normal. Trop normal. AST was elevated a 63. Bili: 3. Pancytopenic on CBC. INR 1.57. COVID and flu were neg. UA w/o signs of infection. XR right hip, knee and ankle were neg. CXR neg.     Past Medical History: Diagnosis Date    COPD (chronic obstructive pulmonary disease) (HCC)     Hepatitis C antibody positive in blood 11/16/2017    History of blood transfusion     MI (myocardial infarction) [facility])        Past Surgical History:        Procedure Laterality Date    CARDIAC CATHETERIZATION      heart cath    COLONOSCOPY  11/17/2017    Initial Colonoscopy; Multiple Colon Polyps    ENDOSCOPY, COLON, DIAGNOSTIC      EYE SURGERY      cataract removal    RI NJX DX/THER SBST INTRLMNR CRV/THRC W/IMG GDN Right 8/13/2018    RIGHT CERVICAL SEVEN THORACIC ONE EPIDURAL STEROID INJECTION SITE CONFIRMED BY FLUOROSCOPY performed by Rosio Meza MD at [address]  01/18/2018    Gastritis, [place]       Medications Prior to Admission:    Prior to Admission medications    Medication Sig Start Date End Date Taking? Authorizing Provider   pantoprazole sodium (PROTONIX) 40 MG PACK packet Take 40 mg by mouth every morning (before breakfast)    Historical Provider, MD   levETIRAcetam (KEPPRA) 500 MG tablet Take 500 mg by mouth 2 times daily Started 2/4/21 supposed to be on but not taking it due to concerns about dizziness. Started having seizures over past year possibly related to intermittent alcohol abuse  - stopping and starting back. Historical Provider, MD   ChlordiazePOXIDE HCl (LIBRIUM PO) Take by mouth    Historical Provider, MD   Multiple Vitamins-Minerals (THERAPEUTIC MULTIVITAMIN-MINERALS) tablet Take 1 tablet by mouth daily 11/17/17 11/17/18  Akiko Jensen MD       Allergies:  Codeine    Social History:  The patient currently lives at home. TOBACCO:   reports that he has been smoking. He has a 45.00 pack-year smoking history. He has never used smokeless tobacco.  ETOH:   reports current alcohol use of about 5.0 standard drinks of alcohol per week.       Family History:   Positive as follows:        Problem Relation Age of Onset    Other Mother        REVIEW OF SYSTEMS:     Constitutional: Negative for fever, chills   HENT: Negative for sore throat   Eyes: Negative for redness   Respiratory: + occasional dyspnea, no cough   Cardiovascular: + intermittent chest pain - none in the last week  Gastrointestinal: Negative for vomiting, diarrhea, melena, hematochezia, + hematemesis - about a week ago that has since resolved   Genitourinary: Negative for hematuria   Musculoskeletal: Negative for arthralgias, + right hip, knee and lower leg pain   Skin: Negative for rash   Neurological: Negative for syncope, + dizziness; no weakness, no vertigo   Hematological: Negative for adenopathy   Psychiatric/Behavorial: Negative for anxiety    PHYSICAL EXAM:    /71   Pulse 97   Temp 98.3 °F (36.8 °C) (Oral)   Resp 17   SpO2 97%   Gen: No distress. Alert. Elderly  male, resting comfortably in ER bed  Eyes: PERRL. No sclera icterus. No conjunctival injection. Neck:Trachea midline. Resp: No accessory muscle use. No crackles. No wheezes. No rhonchi. On RA  CV: Regular rate. Regular rhythm. No murmur. No rub. No edema. Capillary Refill: Brisk,< 3 seconds   Peripheral Pulses: +2 palpable, equal bilaterally   GI: Soft, mild RUQ tenderness - chronic. Non-distended. Hepatomegaly. Normal bowel sounds. Skin: Warm and dry. No rash on exposed extremities. M/S: Normal PROM, decreased AROM of right hip 2/2 pain, normal flexion and extension of right knee, no palpable effusion to right knee, right lateral hip TTP, strength 4/5 on right, left 5/5; BUE strength 5/5  Neuro: Awake. Grossly non-focal, CN II-XII intact, moves all extremities, follows commands, no dysarthria, no pronator drift, normal gaze    Psych: Oriented x 4. No anxiety or agitation.      CBC:   Recent Labs     09/18/21  1500   WBC 2.4*   HGB 8.5*   HCT 27.3*   MCV 84.7   PLT 54*     BMP:   Recent Labs     09/18/21  1500   *   K 4.0      CO2 22   BUN 13   CREATININE 0.6*     LIVER PROFILE:   Recent Labs     09/18/21  1500   AST 63*   ALT 19   LIPASE 30.0   BILITOT 3.0*   ALKPHOS 104     PT/INR:   Recent Labs     09/18/21  1500   PROTIME 18.1*   INR 1.57*     UA:  Recent Labs     09/18/21  1550   COLORU Yellow   PHUR 8.0   CLARITYU Clear   SPECGRAV 1.015   LEUKOCYTESUR Negative   UROBILINOGEN 4.0*   BILIRUBINUR SMALL*   BLOODU Negative   GLUCOSEU Negative        CARDIAC ENZYMES  Recent Labs     09/18/21  1500   TROPONINI <0.01       U/A:    Lab Results   Component Value Date    COLORU Yellow 09/18/2021    CLARITYU Clear 09/18/2021    SPECGRAV 1.015 09/18/2021    LEUKOCYTESUR Negative 09/18/2021    BLOODU Negative 09/18/2021    GLUCOSEU Negative 09/18/2021     CULTURES    Rapid Influenza A/B: negative     SARS-COV-2 - Rapid: Not detected     EKG:  I have reviewed the EKG with the following interpretation:   sinus tachycardia rate of 115  PVC's isolated couplets and salvos. Left axis deviation  Nonspecific ST and T wave abnormality  Abnormal ECG  When compared with ECG of 18-SEP-2021 15:11, (unconfirmed)  Current undetermined rhythm precludes rhythm comparison, needs review  Confirmed by Monae Mittal MD, [address] ([id number]) on 9/18/2021 4:36:38 PM    RADIOLOGY    XR ANKLE RIGHT (MIN 3 VIEWS)   Final Result   No acute bony or joint abnormalities seen in the pelvis, right hip, right   ankle or right knee         XR KNEE RIGHT (1-2 VIEWS)   Final Result   No acute bony or joint abnormalities seen in the pelvis, right hip, right   ankle or right knee         XR HIP RIGHT (2-3 VIEWS)   Final Result   No acute bony or joint abnormalities seen in the pelvis, right hip, right   ankle or right knee         XR CHEST PORTABLE   Final Result   Negative chest radiograph. Pertinent previous results reviewed     Nuclear Stress Test 4/17/2021  Summary   Edi Dawkins is an inferior fixed defect consistent with diaphragmatic attenuation.   Edi Dawkins is no evidence of myocardial ischemia or scar.    Normal myocardial perfusion study.  Normal LV size and systolic function.    Left ventricular ejection fraction of 56 %. ASSESSMENT/PLAN:    Dizziness  Near Syncope  Fall  - BPs stable in ED, trop neg, EKG with multiple PVCs  - etiology: suspect 2/2 orthostatic hypotension vs recurrent PVCs  - XR chest, right hip, knee and ankle were neg  - Admitted to Med Surg with tele  - check orthostatic BPs, telemetry monitoring, gentle IVF x 1 day  - check echocardiogram  - PT/OT    Right Hip and Knee Pain  - XR right hip, knee and ankle neg for acute process  - apply heat, add Lidocaine patch for hip pain, PRN Tramadol  - PT/OT    Chronic Pancytopenia   - likely 2/2 liver disease, noted on prior admission  - WBC: 2.4, Hgb 8.5, Plt 54  - previously Hgb 9.2 in 4/2021  - monitor CBC    Elevated INR  - in setting of liver disease  - 1.57, MELD: 18  - no melena, hematochezia or hematemesis    Alcoholic Cirrhosis of the Liver  Chronic Hepatitis C  Hyperbilirubinemia  Elevated Liver Enzymes  - ALT normal, AST: 63; bili: 3; MELD: 18  - he reports that he drinks occasionally - his last drink was last weekend, denies any since  - no longer on naldolol, does not take any lactulose  - recommended complete alcohol cessation    H/o Esophageal Varices  - no longer taking protonix at home  - reports he had an episode of hematemesis last week which has since resolved  - will re-start Protonix here    Hx of Seizures  - admitted to [address] in 9/2020 for new onset seizures  - previously started on Keppra when admitted in 9/2020 but this was tapered off and discontinued at d/c; felt to be related to alcohol w/d; MRI/EEG were negative  - seizure precautions    DVT Prophylaxis: SCDs  Diet: ADULT DIET;  Regular  Code Status: Full Code    Lashonda Marks PA-C 11:02 AM 9/19/2021

## 2021-09-19 NOTE — PLAN OF CARE
Problem: Falls - Risk of:  Goal: Will remain free from falls  Description: Will remain free from falls  9/19/2021 1558 by Idalia Khan RN  Outcome: Ongoing  9/19/2021 1557 by Idalia Khan RN  Outcome: Ongoing     Problem: Pain:  Goal: Pain level will decrease  Description: Pain level will decrease  9/19/2021 1558 by Idalia Khan RN  Outcome: Ongoing  9/19/2021 1557 by Idalia Khan RN  Outcome: Ongoing     Problem: Pain:  Goal: Control of acute pain  Description: Control of acute pain  9/19/2021 1558 by Idalia Khan RN  Outcome: Ongoing  9/19/2021 1557 by Idalia Khan RN  Outcome: Ongoing

## 2021-09-19 NOTE — FLOWSHEET NOTE
09/19/21 1315   Vital Signs   Blood Pressure Lying 129/78   Pulse Lying 95 PER MINUTE   Blood Pressure Sitting 125/76   Pulse Sitting 88 PER MINUTE   Blood Pressure Standing 100/63   Pulse Standing 92 PER MINUTE       Patient denied being dizzy.

## 2021-09-19 NOTE — PROGRESS NOTES
Inpatient Physical Therapy Evaluation and Treatment    Unit: ED  Date:  9/19/2021  Patient Name:    Panchito Pal  Admitting diagnosis:  Syncope and collapse [R55]  Weakness [R53.1]  History of hepatitis C [Z86.19]  History of cirrhosis [Z87.19]  Multiple falls [R29.6]  Admit Date:  9/18/2021  Precautions/Restrictions/WB Status/ Lines/ Wounds/ Oxygen: Fall risk, Telemetry and Continuous pulse oximetry    Treatment Time: 900-930  (OBSERVATION)   Treatment Number:  1   Timed Code Treatment Minutes: 5 minutes  Total Treatment Minutes:  30  minutes    Patient Goals for Therapy: \" to go home \"          Discharge Recommendations: Home PRN assist   DME needs for discharge: Needs Met       Therapy recommendation for EMS Transport: can transport by wheelchair    Therapy recommendations for staff:   Stand by assist with use of No AD for all transfers and ambulation within room    History of Present Illness: Per H&P 9/18 Dr. Oxana Frazier ED provider: Marylene Ano is a 77 y.o. male  who presents to the ED complaining of episodes of lightheadedness, one episode of syncope. Patient states that he has been having multiple dizzy spells and falls over the past year, does have a history of alcoholic cirrhosis and hepatitis C, was previously taking nadolol. His primary care provider took him off of the nadolol, he states that since then he has had decreased frequency of falls but is still having them for the most part a few times a week. He denies chest pain or shortness of breath associated with the symptoms. He states that he really just feels lightheaded, not a room spinning sensation. He states that he has pain in his right hip traveling down to the bottom of his right leg since a fall that occurred recently and that is really what brought him into the ED today. He has not been taking anything at home for the pain. He denies head trauma. He denies other complaints or concerns. \"  X-rays of the right hip, knee, and ankle performed and were negative for any acute findings. Home Health S4 Level Recommendation:  NA  AM-PAC Mobility Score       AM-PAC Inpatient Mobility without Stair Climbing Raw Score : 20    Preadmission Environment:    Pt. Lives with spouse. Pt does not have access to  assistance. Home environment:  apartment   Steps to enter first floor:  No steps       Steps to second floor:  N/A  Bathroom: tub/shower combo, grab bars (1 in the shower and 1 outside the shower)  and standard toilet  Equipment owned:  [facility])     Preadmission Status / PLOF:  History of falls             Yes  Pt. Able to drive          Yes  Pt Fully independent with ADL's         Yes  Pt. Required assistance from family for:  Laundry     Pt sleeps in flat bed. Pt. Fully independent for transfers and gait and walked with: No Device     Pain:  Yes  Ratin  Location:  R hip   Pain Medicine Status:  No request made       Cognition:    A&O Person, Place, Time and Situation   Able to follow 2 step commands, consistently.     Subjective:  Pt supine in bed upon therapist arrival. Pt agreeable to work with therapy this date. Upper Extremity ROM/Strength  Please see OT evaluation. Lower Extremity ROM / Strength   AROM WFL: Yes    BLE strength WFL, but not formally assessed with MMT. Lower Extremity Sensation    WFL    Lower Extremity Proprioception:   WFL    Coordination and Tone  WFL    Balance  Sitting:  Normal; Independent  Comments: at EOB in unsupported sitting.     Standing: Good ; Supervision  Comments: without AD    Bed Mobility   Supine to Sit:    Independent  Sit to Supine:   Independent  Rolling:   Not Tested  Scooting in sitting: Independent  Scooting in supine:  Not Tested    Transfer Training     Sit to stand:   Supervision  Stand to sit:   Supervision  Bed to Chair:   Not Tested with use of No AD    Gait gait completed as indicated below  Distance:      150 ft  Deviations (firm surface/linoleum):  decreased yaron, antalgic pattern and decreased stance time on right  Assistive Device Used:    No AD  Level of Assist:    SBA to Supervision  Comment: good balance and activity tolerance. Stair Training deferred due to pain. Pt appears to have adequate functional strength and balance. Activity Tolerance   Pt completed therapy session with Pain noted with ambulation    Positioning Needs   Pt in bed, no alarm needed, positioned in proper neutral alignment and pressure relief provided. Call light provided and all needs within reach    Exercises Initiated  Sumit deferred secondary to treatment focus on functional mobility  NA    Other  None. Patient/Family Education   Pt educated on role of inpatient PT, POC, importance of continued activity, DC recommendations and calling for assist with mobility. Assessment  Pt seen for Physical Therapy evaluation in acute care setting. PTA pt was independent with all ADLs and functional mobility without AD. Upon evaluation pt demonstrates good functional strength. He is limited by LLE pain which he attributes as a \"bruise\" that he suffered during his last fall at home. He ambulated community distance in ED without AD at SBA/supervision level, with a moderately antalgic pattern. Discussed possible use of cane to assist with pain management in the acute healing phase. No further acute PT warranted. Recommending Home PRN assist upon discharge as patient functioning close to baseline level    Goals / Plan    One time eval and treat only. No acute PT goals established. Signature: Kalina Munoz, PT, DPT    If patient discharges from this facility prior to next visit, this note will serve as the Discharge Summary.

## 2021-09-20 LAB
A/G RATIO: 0.7 (ref 1.1–2.2)
ABO/RH: NORMAL
ALBUMIN SERPL-MCNC: 3 G/DL (ref 3.4–5)
ALP BLD-CCNC: 105 U/L (ref 40–129)
ALT SERPL-CCNC: 11 U/L (ref 10–40)
ANION GAP SERPL CALCULATED.3IONS-SCNC: 8 MMOL/L (ref 3–16)
ANISOCYTOSIS: ABNORMAL
ANTIBODY SCREEN: NORMAL
AST SERPL-CCNC: 34 U/L (ref 15–37)
ATYPICAL LYMPHOCYTE RELATIVE PERCENT: 2 % (ref 0–6)
BASOPHILS ABSOLUTE: 0 K/UL (ref 0–0.2)
BASOPHILS RELATIVE PERCENT: 1 %
BILIRUB SERPL-MCNC: 1.8 MG/DL (ref 0–1)
BLOOD BANK DISPENSE STATUS: NORMAL
BLOOD BANK PRODUCT CODE: NORMAL
BPU ID: NORMAL
BUN BLDV-MCNC: 17 MG/DL (ref 7–20)
CALCIUM SERPL-MCNC: 8.1 MG/DL (ref 8.3–10.6)
CHLORIDE BLD-SCNC: 101 MMOL/L (ref 99–110)
CO2: 22 MMOL/L (ref 21–32)
CREAT SERPL-MCNC: 0.7 MG/DL (ref 0.8–1.3)
DESCRIPTION BLOOD BANK: NORMAL
EOSINOPHILS ABSOLUTE: 0 K/UL (ref 0–0.6)
EOSINOPHILS RELATIVE PERCENT: 2 %
FERRITIN: 17 NG/ML (ref 30–400)
FOLATE: 19.19 NG/ML (ref 4.78–24.2)
GFR AFRICAN AMERICAN: >60
GFR NON-AFRICAN AMERICAN: >60
GLOBULIN: 4.2 G/DL
GLUCOSE BLD-MCNC: 92 MG/DL (ref 70–99)
HCT VFR BLD CALC: 21.7 % (ref 40.5–52.5)
HCT VFR BLD CALC: 22 % (ref 40.5–52.5)
HCT VFR BLD CALC: 24.5 % (ref 40.5–52.5)
HEMATOLOGY PATH CONSULT: NO
HEMATOLOGY PATH CONSULT: NORMAL
HEMOGLOBIN: 6.7 G/DL (ref 13.5–17.5)
HEMOGLOBIN: 7.8 G/DL (ref 13.5–17.5)
HYPOCHROMIA: ABNORMAL
IMMATURE RETIC FRACT: 0.67 (ref 0.21–0.37)
IRON SATURATION: 7 % (ref 20–50)
IRON: 29 UG/DL (ref 59–158)
LACTATE DEHYDROGENASE: 323 U/L (ref 100–190)
LYMPHOCYTES ABSOLUTE: 0.6 K/UL (ref 1–5.1)
LYMPHOCYTES RELATIVE PERCENT: 31 %
MCH RBC QN AUTO: 26.1 PG (ref 26–34)
MCHC RBC AUTO-ENTMCNC: 30.5 G/DL (ref 31–36)
MCV RBC AUTO: 85.6 FL (ref 80–100)
MONOCYTES ABSOLUTE: 0.2 K/UL (ref 0–1.3)
MONOCYTES RELATIVE PERCENT: 14 %
NEUTROPHILS ABSOLUTE: 0.9 K/UL (ref 1.7–7.7)
NEUTROPHILS RELATIVE PERCENT: 50 %
PDW BLD-RTO: 21.8 % (ref 12.4–15.4)
PLATELET # BLD: 46 K/UL (ref 135–450)
PLATELET SLIDE REVIEW: ABNORMAL
PMV BLD AUTO: 8.4 FL (ref 5–10.5)
POIKILOCYTES: ABNORMAL
POLYCHROMASIA: ABNORMAL
POTASSIUM REFLEX MAGNESIUM: 3.7 MMOL/L (ref 3.5–5.1)
RBC # BLD: 2.57 M/UL (ref 4.2–5.9)
RETICULOCYTE ABSOLUTE COUNT: 0.09 M/UL
RETICULOCYTE COUNT PCT: 3.6 % (ref 0.5–2.18)
SLIDE REVIEW: ABNORMAL
SODIUM BLD-SCNC: 131 MMOL/L (ref 136–145)
TOTAL IRON BINDING CAPACITY: 390 UG/DL (ref 260–445)
TOTAL PROTEIN: 7.2 G/DL (ref 6.4–8.2)
VITAMIN B-12: 929 PG/ML (ref 211–911)
WBC # BLD: 1.7 K/UL (ref 4–11)

## 2021-09-20 PROCEDURE — 80053 COMPREHEN METABOLIC PANEL: CPT

## 2021-09-20 PROCEDURE — 82728 ASSAY OF FERRITIN: CPT

## 2021-09-20 PROCEDURE — 6370000000 HC RX 637 (ALT 250 FOR IP): Performed by: INTERNAL MEDICINE

## 2021-09-20 PROCEDURE — G0378 HOSPITAL OBSERVATION PER HR: HCPCS

## 2021-09-20 PROCEDURE — 83010 ASSAY OF HAPTOGLOBIN QUANT: CPT

## 2021-09-20 PROCEDURE — 84238 ASSAY NONENDOCRINE RECEPTOR: CPT

## 2021-09-20 PROCEDURE — 88184 FLOWCYTOMETRY/ TC 1 MARKER: CPT

## 2021-09-20 PROCEDURE — 86900 BLOOD TYPING SEROLOGIC ABO: CPT

## 2021-09-20 PROCEDURE — 6370000000 HC RX 637 (ALT 250 FOR IP): Performed by: PHYSICIAN ASSISTANT

## 2021-09-20 PROCEDURE — 36415 COLL VENOUS BLD VENIPUNCTURE: CPT

## 2021-09-20 PROCEDURE — 85045 AUTOMATED RETICULOCYTE COUNT: CPT

## 2021-09-20 PROCEDURE — 99233 SBSQ HOSP IP/OBS HIGH 50: CPT | Performed by: INTERNAL MEDICINE

## 2021-09-20 PROCEDURE — 85018 HEMOGLOBIN: CPT

## 2021-09-20 PROCEDURE — 86923 COMPATIBILITY TEST ELECTRIC: CPT

## 2021-09-20 PROCEDURE — 88185 FLOWCYTOMETRY/TC ADD-ON: CPT

## 2021-09-20 PROCEDURE — 83550 IRON BINDING TEST: CPT

## 2021-09-20 PROCEDURE — 82746 ASSAY OF FOLIC ACID SERUM: CPT

## 2021-09-20 PROCEDURE — 94640 AIRWAY INHALATION TREATMENT: CPT

## 2021-09-20 PROCEDURE — 86901 BLOOD TYPING SEROLOGIC RH(D): CPT

## 2021-09-20 PROCEDURE — 82607 VITAMIN B-12: CPT

## 2021-09-20 PROCEDURE — P9016 RBC LEUKOCYTES REDUCED: HCPCS

## 2021-09-20 PROCEDURE — 2580000003 HC RX 258: Performed by: INTERNAL MEDICINE

## 2021-09-20 PROCEDURE — 85025 COMPLETE CBC W/AUTO DIFF WBC: CPT

## 2021-09-20 PROCEDURE — 83540 ASSAY OF IRON: CPT

## 2021-09-20 PROCEDURE — 85014 HEMATOCRIT: CPT

## 2021-09-20 PROCEDURE — 83615 LACTATE (LD) (LDH) ENZYME: CPT

## 2021-09-20 PROCEDURE — 99221 1ST HOSP IP/OBS SF/LOW 40: CPT | Performed by: INTERNAL MEDICINE

## 2021-09-20 PROCEDURE — 86850 RBC ANTIBODY SCREEN: CPT

## 2021-09-20 PROCEDURE — 36430 TRANSFUSION BLD/BLD COMPNT: CPT

## 2021-09-20 RX ORDER — SODIUM CHLORIDE 9 MG/ML
INJECTION, SOLUTION INTRAVENOUS PRN
Status: COMPLETED | OUTPATIENT
Start: 2021-09-20 | End: 2021-09-20

## 2021-09-20 RX ORDER — SODIUM CHLORIDE 9 MG/ML
INJECTION, SOLUTION INTRAVENOUS PRN
Status: DISCONTINUED | OUTPATIENT
Start: 2021-09-20 | End: 2021-09-21 | Stop reason: HOSPADM

## 2021-09-20 RX ADMIN — SODIUM CHLORIDE, PRESERVATIVE FREE 10 ML: 5 INJECTION INTRAVENOUS at 08:53

## 2021-09-20 RX ADMIN — Medication 10 ML: at 11:09

## 2021-09-20 RX ADMIN — PANTOPRAZOLE SODIUM 40 MG: 40 TABLET, DELAYED RELEASE ORAL at 07:04

## 2021-09-20 RX ADMIN — Medication 10 ML: at 14:33

## 2021-09-20 RX ADMIN — TRAMADOL HYDROCHLORIDE 50 MG: 50 TABLET, FILM COATED ORAL at 18:22

## 2021-09-20 RX ADMIN — SODIUM CHLORIDE: 9 INJECTION, SOLUTION INTRAVENOUS at 11:11

## 2021-09-20 RX ADMIN — SODIUM CHLORIDE, PRESERVATIVE FREE 10 ML: 5 INJECTION INTRAVENOUS at 20:37

## 2021-09-20 RX ADMIN — TRAMADOL HYDROCHLORIDE 50 MG: 50 TABLET, FILM COATED ORAL at 12:21

## 2021-09-20 RX ADMIN — TRAMADOL HYDROCHLORIDE 50 MG: 50 TABLET, FILM COATED ORAL at 00:50

## 2021-09-20 RX ADMIN — IPRATROPIUM BROMIDE AND ALBUTEROL 1 PUFF: 20; 100 SPRAY, METERED RESPIRATORY (INHALATION) at 15:29

## 2021-09-20 ASSESSMENT — PAIN DESCRIPTION - LOCATION
LOCATION: BACK
LOCATION: LEG;HIP;KNEE
LOCATION: HIP

## 2021-09-20 ASSESSMENT — PAIN SCALES - GENERAL
PAINLEVEL_OUTOF10: 3
PAINLEVEL_OUTOF10: 4
PAINLEVEL_OUTOF10: 7

## 2021-09-20 ASSESSMENT — PAIN DESCRIPTION - PAIN TYPE
TYPE: CHRONIC PAIN
TYPE: CHRONIC PAIN;ACUTE PAIN
TYPE: ACUTE PAIN

## 2021-09-20 ASSESSMENT — PAIN DESCRIPTION - FREQUENCY
FREQUENCY: CONTINUOUS
FREQUENCY: CONTINUOUS

## 2021-09-20 ASSESSMENT — PAIN DESCRIPTION - ORIENTATION
ORIENTATION: RIGHT
ORIENTATION: RIGHT
ORIENTATION: MID;LOWER

## 2021-09-20 ASSESSMENT — PAIN - FUNCTIONAL ASSESSMENT
PAIN_FUNCTIONAL_ASSESSMENT: ACTIVITIES ARE NOT PREVENTED
PAIN_FUNCTIONAL_ASSESSMENT: ACTIVITIES ARE NOT PREVENTED

## 2021-09-20 ASSESSMENT — PAIN DESCRIPTION - PROGRESSION
CLINICAL_PROGRESSION: NOT CHANGED
CLINICAL_PROGRESSION: NOT CHANGED

## 2021-09-20 ASSESSMENT — PAIN DESCRIPTION - DESCRIPTORS
DESCRIPTORS: ACHING;DISCOMFORT;SORE
DESCRIPTORS: ACHING;DISCOMFORT

## 2021-09-20 ASSESSMENT — PAIN DESCRIPTION - ONSET
ONSET: ON-GOING
ONSET: ON-GOING

## 2021-09-20 NOTE — CONSULTS
HEMATOLOGY/ONCOLOGY CONSULTATION:     9/20/2021 8:39 AM    REASON FOR CONSULT: Pancytopenia    PROVIDERS:  Referring Not In System (Inactive)    CHIEF COMPLAINT:     Chief Complaint   Patient presents with    Fall     Pt states he has had multiple dizzy spells and falls d/t script of Nadolol 20mg. Pt no longer taking, c/o right leg and hip pain from fall earlier this week. HISTORY OF PRESENT ILLNESS:     HPI:  77 WM with pmh Hep C/cirrhosis, alcohol abuse, esophageal varices and seizure disorder related to alcohol. The patient has been admitted with generalized weakness and after a fall at home. He did have an episode of emesis that was bright red. He has chronic pancytopenia from cirrhosis. His counts have been lower than baseline this admission.      PAST MEDICAL HISTORY:     Past Medical History:   Diagnosis Date    [facility] [address]     Hepatitis C antibody positive in blood 11/16/2017    History of blood transfusion     MI (myocardial infarction) ([facility] [address]        PAST SURGICAL HISTORY:        Past Surgical History:   Procedure Laterality Date    CARDIAC CATHETERIZATION      heart cath    COLONOSCOPY  11/17/2017    Initial Colonoscopy; Multiple Colon Polyps    ENDOSCOPY, COLON, DIAGNOSTIC      EYE SURGERY      cataract removal    IL NJX DX/THER SBST INTRLMNR CRV/THRC W/IMG GDN Right 8/13/2018    RIGHT CERVICAL SEVEN THORACIC ONE EPIDURAL STEROID INJECTION SITE CONFIRMED BY FLUOROSCOPY performed by Eddie Pineda MD at [address]  01/18/2018    Gastritis, [place]       SOCIAL HISTORY:     Social History     Socioeconomic History    Marital status:      Spouse name: Not on file    Number of children: Not on file    Years of education: Not on file    Highest education level: Not on file   Occupational History    Not on file   Tobacco Use    Smoking status: Current Every Day Smoker     Packs/day: 1.00     Years: 45.00     Pack years: 45.00    Smokeless tobacco: Never Used    Tobacco comment: refused, is seeing primary physician. Substance and Sexual Activity    Alcohol use: Yes     Alcohol/week: 5.0 standard drinks     Types: 5 Cans of beer per week    Drug use: No    Sexual activity: Not on file   Other Topics Concern    Not on file   Social History Narrative    Not on file     Social Determinants of Health     Financial Resource Strain:     Difficulty of Paying Living Expenses:    Food Insecurity:     Worried About Running Out of Food in the Last Year:     [address] in the Last Year:    Transportation Needs:     Lack of Transportation (Medical):  Lack of Transportation (Non-Medical):    Physical Activity:     Days of Exercise per Week:     Minutes of Exercise per Session:    Stress:     Feeling of Stress :    Social Connections:     Frequency of Communication with Friends and Family:     Frequency of Social Gatherings with Friends and Family:     Attends Zoroastrian Services:     Active Member of Clubs or Organizations:     Attends Club or Organization Meetings:     Marital Status:    Intimate Partner Violence:     Fear of Current or Ex-Partner:     Emotionally Abused:     Physically Abused:     Sexually Abused:        FAMILY HISTORY:     Family History   Problem Relation Age of Onset    Other Mother        ALLERGIES:     Allergies as of 09/18/2021 - Fully Reviewed 09/18/2021   Allergen Reaction Noted    Codeine Hives 05/14/2011       MEDICATIONS:     No current facility-administered medications on file prior to encounter. No current outpatient medications on file prior to encounter. REVIEW OF SYSTEMS:       10 point ROS completed. Pertinent positives in HPI, otherwise negative.      PHYSICAL EXAM:       Vitals:    09/20/21 0746   BP: 117/71   Pulse: 91   Resp: 16   Temp: 98 °F (36.7 °C)   SpO2: 96%       General appearance: alert and cooperative  Head: Normocephalic, without obvious abnormality, atraumatic  Neck: No palpable lymphadenopathy in supraclavicular or cervical chains  Lungs: Clear to auscultation bilaterally, no audible rales, wheezes or crackles  Heart: Regular rate and rhythm, S1, S2 normal  Abdomen: Soft, non-tender; bowel sounds normal; no masses,  no organomegaly  Extremities: without cyanosis, clubbing, edema or asymmetry  Skin: No jaundice, purpura or petechiae      LABS:     Lab Results   Component Value Date    WBC 1.7 (LL) 09/20/2021    HGB 6.7 (LL) 09/20/2021    HCT 22.0 (L) 09/20/2021    MCV 85.6 09/20/2021    PLT 46 (L) 09/20/2021       Lab Results   Component Value Date    GLUCOSE 92 09/20/2021    BUN 17 09/20/2021    CREATININE 0.7 (L) 09/20/2021    K 3.7 09/20/2021       Lab Results   Component Value Date    ALKPHOS 105 09/20/2021    ALT 11 09/20/2021    AST 34 09/20/2021    BILITOT 1.8 (H) 09/20/2021    BILIDIR 2.3 (H) 04/17/2021    PROT 7.2 09/20/2021       IMAGING:     XR HIP RIGHT (2-3 VIEWS)  Result Date: 9/18/2021  No acute bony or joint abnormalities seen in the pelvis, right hip, right ankle or right knee     XR KNEE RIGHT (1-2 VIEWS)  Result Date: 9/18/2021  No acute bony or joint abnormalities seen in the pelvis, right hip, right ankle or right knee     XR ANKLE RIGHT (MIN 3 VIEWS)  Result Date: 9/18/2021  No acute bony or joint abnormalities seen in the pelvis, right hip, right ankle or right knee     XR CHEST PORTABLE  Result Date: 9/18/2021  Negative chest radiograph. ASSESSMENT:     Problem List Items Addressed This Visit     Multiple falls - Primary    History of hepatitis C    History of cirrhosis      Other Visit Diagnoses     Syncope and collapse                    PLAN:     1. Pancytopenia, acute on chronic  2. Hep C/cirrhosis  3. Alcohol Abuse  4. Falls at home  6. Syncope  7. Hematemesis  8. H/O esophageal varices  9. Seizure d/o  10.  Elevated LFT    - chronic pancytopenia from Hep C/cirrhosis and on-going alcohol abuse  - acute drop this admission  - last drink 1 week ago  - could be partially dilutional, would also be concerned about bleeding with drop in Hgb and hematemesis  - consider GI consult  - transfuse 1 unit PRBC  - check iron studies, B12, folate, hemolytic panel, flow cytometry  - can add Granix if febrile or sign of infection    Elaina Gonzalez MD

## 2021-09-20 NOTE — PLAN OF CARE
Problem: Falls - Risk of:  Goal: Will remain free from falls  Description: Will remain free from falls  9/20/2021 0319 by Omar Couch RN  Outcome: Ongoing  9/19/2021 1558 by Alexis Horn RN  Outcome: Ongoing  9/19/2021 1557 by Alexis Horn RN  Outcome: Ongoing  Goal: Absence of physical injury  Description: Absence of physical injury  9/20/2021 0319 by Omar Couch RN  Outcome: Ongoing  9/19/2021 1558 by Alexis Horn RN  Outcome: Ongoing  9/19/2021 1557 by Alexis Horn RN  Outcome: Ongoing     Problem: Pain:  Goal: Pain level will decrease  Description: Pain level will decrease  9/20/2021 0319 by Omar Couch RN  Outcome: Ongoing  9/19/2021 1558 by Alexis Horn RN  Outcome: Ongoing  9/19/2021 1557 by Alexis Horn RN  Outcome: Ongoing  Goal: Control of acute pain  Description: Control of acute pain  9/20/2021 0319 by Omar Couch RN  Outcome: Ongoing  9/19/2021 1558 by lAexis Horn RN  Outcome: Ongoing  9/19/2021 1557 by Alexis Horn RN  Outcome: Ongoing  Goal: Control of chronic pain  Description: Control of chronic pain  9/20/2021 0319 by Omar Couch RN  Outcome: Ongoing  9/19/2021 1558 by Alexis Horn RN  Outcome: Ongoing  9/19/2021 1557 by Alexis Horn RN  Outcome: Ongoing  Goal: Patient's pain/discomfort is manageable  Description: Patient's pain/discomfort is manageable  9/20/2021 0319 by Omar Couch RN  Outcome: Ongoing  9/19/2021 1558 by Alexis Horn RN  Outcome: Ongoing     Problem: Infection:  Goal: Will remain free from infection  Description: Will remain free from infection  9/20/2021 0319 by Omar Couch RN  Outcome: Ongoing  9/19/2021 1558 by Alexis Horn RN  Outcome: Ongoing     Problem: Safety:  Goal: Free from accidental physical injury  Description: Free from accidental physical injury  9/20/2021 0319 by Omar Couch RN  Outcome: Ongoing  9/19/2021 1558 by Alexis Horn RN  Outcome: Ongoing  Goal: Free from intentional harm  Description: Free from intentional harm  9/20/2021 0319 by Meghan Forrester RN  Outcome: Ongoing  9/19/2021 1558 by Jaswinder Tanner RN  Outcome: Ongoing     Problem: Daily Care:  Goal: Daily care needs are met  Description: Daily care needs are met  9/20/2021 0319 by Meghan Forrester RN  Outcome: Ongoing  9/19/2021 1558 by Jaswinder Tanner RN  Outcome: Ongoing     Problem: Skin Integrity:  Goal: Skin integrity will stabilize  Description: Skin integrity will stabilize  9/20/2021 0319 by Meghan Forrester RN  Outcome: Ongoing  9/19/2021 1558 by Jaswinder Tanner RN  Outcome: Ongoing     Problem: Discharge Planning:  Goal: Patients continuum of care needs are met  Description: Patients continuum of care needs are met  9/20/2021 0319 by Meghan Forrester RN  Outcome: Ongoing  9/19/2021 1558 by Jaswinder Tanner RN  Outcome: Ongoing

## 2021-09-20 NOTE — CARE COORDINATION
Case Management Assessment  Initial Evaluation      Patient Name: Gutierrez Martinez  YOB: 1955  Diagnosis: Syncope and collapse [R55]  Weakness [R53.1]  History of hepatitis C [Z86.19]  History of cirrhosis [Z87.19]  Multiple falls [R29.6]  Date / Time: 9/18/2021  2:30 PM    Admission status/Date: 09/18/2021 Observation   Chart Reviewed: Yes      Patient Interviewed: Yes   Family Interviewed:  No      Hospitalization in the last 30 days:  No      Health Care Decision Maker :   Primary Decision Maker: Charleen Li - Spouse - [phone]    (CM - must 1st enter selection under Navigator - emergency contact- Health Care Decision Maker Relationship and pick relationship)   Who do you trust or have selected to make healthcare decisions for you      Met with: pt   Interview conducted  (bedside/phone): bedside    Current PCP: He thinks he Dr. Akira Tran and stated he has an appointment on October 12th     [facility] Medicare and Medicaid  Precert required for SNF : Y          3 night stay required -  N    ADLS  Support Systems/Care Needs: Spouse/Significant Other  Transportation: self and family    Meal Preparation: self and family    Housing  Living Arrangements: pt lives at home with his family  Steps: 0  Intent for return to present living arrangements: Yes  Identified Issues: [address] [address] [facility] with [address] : No Agency:(Services)  Type of Home Care Services: None  Passport/Waiver : No  :                      Phone Number:    Passport/Waiver Services: n/a          Durable Medical Equiptment   DME Provider: n/a  Equipment:   Walker___Cane__x (dont use)_RTS___ BSC___Shower Chair___Hospital Bed___W/C____Other________  02 at ____Liter(s)---wears(frequency)_______ [facility] ___ CPAP___ BiPap___   N/A____      Home O2 Use :  No    If No for home O2---if presently on O2 during hospitalization:  No  if yes CM to follow for potential DC O2 need  Informed of need for care provider to bring portable home O2 tank on day of discharge for nursing to connect prior to leaving:   Not Indicated  Verbalized agreement/Understanding:   Not Indicated    Community Service Affiliation  Dialysis:  No    · Agency:  · Location:  · Dialysis Schedule:  · Phone:   · Fax: Other Community Services: n/a    DISCHARGE PLAN: Explained Case Management role/services. Chart review completed. Met with pt at bedside. Pt stated he is independent at home and plans on returning home. He stated his wife helps him when he needs assistance. Discussed PT/OT recommendations for home with PRN assist and pt stated his wife helps him if needed. He denied needing HHC for RN/PT/OT when writer inquired about discharge. He denied all needs from CM. CM not following. Please notify CM if needs or concerns arise.

## 2021-09-20 NOTE — PROGRESS NOTES
PRBC x 1 started @ 3583. VS before start/after 15 minutes @ 7437. See flowsheet. Signed off with Leanne Santillan. Monitored first 15 mins. Gave prn pain med for pain level 7/10 located back see mar. A/O x 4. Bed locked/lowest position. Refuses bed check. Compliant with call light. Call light within reach.

## 2021-09-20 NOTE — PLAN OF CARE
Problem: Falls - Risk of:  Goal: Will remain free from falls  Description: Will remain free from falls  9/20/2021 0906 by Tracy Montelongo RN  Outcome: Ongoing  9/20/2021 0319 by Mely Fortune RN  Outcome: Ongoing  Goal: Absence of physical injury  Description: Absence of physical injury  9/20/2021 0906 by Tracy Montelongo RN  Outcome: Ongoing  9/20/2021 0319 by Mely Fortune RN  Outcome: Ongoing     Problem: Pain:  Goal: Pain level will decrease  Description: Pain level will decrease  9/20/2021 0906 by rTacy Montelongo RN  Outcome: Ongoing  9/20/2021 0319 by Mely Fortune RN  Outcome: Ongoing  Goal: Control of acute pain  Description: Control of acute pain  9/20/2021 0906 by Tracy Montelongo RN  Outcome: Ongoing  9/20/2021 0319 by Mely Fortune RN  Outcome: Ongoing  Goal: Control of chronic pain  Description: Control of chronic pain  9/20/2021 0906 by Tracy Montelongo RN  Outcome: Ongoing  9/20/2021 0319 by Mely Fortune RN  Outcome: Ongoing  Goal: Patient's pain/discomfort is manageable  Description: Patient's pain/discomfort is manageable  9/20/2021 0906 by Tracy Montelongo RN  Outcome: Ongoing  9/20/2021 0319 by Mely Fortune RN  Outcome: Ongoing     Problem: Infection:  Goal: Will remain free from infection  Description: Will remain free from infection  9/20/2021 0906 by Tracy Montelongo RN  Outcome: Ongoing  9/20/2021 0319 by Mely Fortune RN  Outcome: Ongoing     Problem: Safety:  Goal: Free from accidental physical injury  Description: Free from accidental physical injury  9/20/2021 0906 by Tracy Montelongo RN  Outcome: Ongoing  9/20/2021 0319 by Mely Fortune RN  Outcome: Ongoing  Goal: Free from intentional harm  Description: Free from intentional harm  9/20/2021 0906 by Tracy Montelongo RN  Outcome: Ongoing  9/20/2021 0319 by Mely Fortune RN  Outcome: Ongoing     Problem: Daily Care:  Goal: Daily care needs are met  Description: Daily care needs are met  9/20/2021 0906 by Tracy Montelongo RN  Outcome: Ongoing  9/20/2021 0319 by Yue Alcantar RN  Outcome: Ongoing     Problem: Skin Integrity:  Goal: Skin integrity will stabilize  Description: Skin integrity will stabilize  9/20/2021 0906 by Elena Villalpando RN  Outcome: Ongoing  9/20/2021 0319 by Yue Alcantar RN  Outcome: Ongoing     Problem: Discharge Planning:  Goal: Patients continuum of care needs are met  Description: Patients continuum of care needs are met  9/20/2021 0906 by Elena Villalpando RN  Outcome: Ongoing  9/20/2021 0319 by Yue Alcantar RN  Outcome: Ongoing

## 2021-09-20 NOTE — PROGRESS NOTES
Patient has rested some throughout the night, ultram gave x1 for right leg pain and chronic back pain. Patient placed in Neutropenic precautions due to low WBC count and low ANC.

## 2021-09-20 NOTE — PROGRESS NOTES
Progress Note    Admit Date:  9/18/2021    Subjective:  Mr. Romana Dallas is some better today. No active bleeding. No chest pain. No fever or chills. Objective:   /71   Pulse 91   Temp 98 °F (36.7 °C) (Oral)   Resp 16   Ht 5' 8\" (1.727 m)   Wt 165 lb 11.2 oz (75.2 kg)   SpO2 96%   BMI 25.19 kg/m²          Intake/Output Summary (Last 24 hours) at 9/20/2021 8149  Last data filed at 9/20/2021 7170  Gross per 24 hour   Intake 240 ml   Output 1850 ml   Net -1610 ml       Physical Exam:    General appearance: alert, appears stated age and cooperative  Head: Normocephalic, without obvious abnormality, atraumatic  Eyes: conjunctivae/corneas clear. PERRL, EOM's intact.   Neck: no adenopathy, no carotid bruit, no JVD, supple, symmetrical, trachea midline and thyroid not enlarged, symmetric, no tenderness/mass/nodules  Lungs: clear to auscultation bilaterally  Heart: regular rate and rhythm, S1, S2 normal, no murmur, click, rub or gallop  Abdomen: soft, non-tender; bowel sounds normal; no masses,  no organomegaly  Extremities: extremities normal, atraumatic, no cyanosis or edema  Pulses: 2+ and symmetric  Skin: Skin color, texture, turgor normal. No rashes or lesions  Neurologic: Grossly normal    Scheduled Meds:   albuterol-ipratropium  1 puff Inhalation Q6H    lidocaine  1 patch TransDERmal Daily    pantoprazole  40 mg Oral QAM AC    sodium chloride flush  5-40 mL IntraVENous 2 times per day       Continuous Infusions:   sodium chloride      sodium chloride      sodium chloride         PRN Meds:  sodium chloride, sodium chloride, traMADol, sodium chloride flush, sodium chloride, ondansetron **OR** ondansetron, polyethylene glycol, perflutren lipid microspheres, ibuprofen      Data:  CBC:   Recent Labs     09/18/21  1500 09/19/21  2248 09/20/21  0756   WBC 2.4* 1.7* 1.7*   HGB 8.5* 7.4* 6.7*   HCT 27.3* 23.9* 22.0*   MCV 84.7 86.1 85.6   PLT 54* 48* 46*     BMP:   Recent Labs     09/18/21  1500 09/19/21  2241 09/20/21  0756   * 132* 131*   K 4.0 4.0 3.7    101 101   CO2 22 23 22   BUN 13 22* 17   CREATININE 0.6* 0.8 0.7*     LIVER PROFILE:   Recent Labs     09/18/21  1500 09/19/21  2248 09/20/21  0756   AST 63* 38* 34   ALT 19 13 11   LIPASE 30.0  --   --    BILITOT 3.0* 1.9* 1.8*   ALKPHOS 104 124 105     PT/INR:   Recent Labs     09/18/21  1500   PROTIME 18.1*   INR 1.57*     Cultures:   Results for Sandeep Lutz (MRN [id number]) as of 9/20/2021 08:37   Ref. Range 9/18/2021 16:05   INFLUENZA A Latest Ref Range: NOT DETECTED  NOT DETECTED   INFLUENZA B Latest Ref Range: NOT DETECTED  NOT DETECTED   SARS-CoV-2 RNA, RT PCR Latest Ref Range: NOT DETECTED  NOT DETECTED       XR ANKLE RIGHT (MIN 3 VIEWS)   Final Result   No acute bony or joint abnormalities seen in the pelvis, right hip, right   ankle or right knee         XR KNEE RIGHT (1-2 VIEWS)   Final Result   No acute bony or joint abnormalities seen in the pelvis, right hip, right   ankle or right knee         XR HIP RIGHT (2-3 VIEWS)   Final Result   No acute bony or joint abnormalities seen in the pelvis, right hip, right   ankle or right knee         XR CHEST PORTABLE   Final Result   Negative chest radiograph. Assessment:  Active Problems:    Alcohol abuse    Thrombocytopenia (HCC)    Coronary artery disease involving native coronary artery of native heart without angina pectoris    Alcoholic cirrhosis of liver without ascites (HCC)    Hyponatremia    Weakness    Multiple falls    History of hepatitis C  Resolved Problems:    * No resolved hospital problems.  *      Plan:       Dizziness  Near Syncope  Fall  - BPs stable in ED, trop neg, EKG with multiple PVCs  - etiology: suspect 2/2 orthostatic hypotension vs recurrent PVCs  - XR chest, right hip, knee and ankle were neg  - Admitted to Med Surg with tele  - checked orthostatic BPs, telemetry monitoring, gentle IVF x 1 day  - check echocardiogram  - PT/OT     Right Hip and Knee Pain  - XR right hip, knee and ankle neg for acute process  - apply heat, add Lidocaine patch for hip pain, PRN Tramadol  - PT/OT     Chronic Pancytopenia and acute anemia with no obvious blood loss  - likely 2/2 liver disease, noted on prior admission  - WBC: 2.4, Hgb 8.5, Plt 54. Hb is 6.4. give one unit of PRBC. - previously Hgb 9.2 in 4/2021  - monitor CBC     Elevated INR  - in setting of liver disease  - 1.57, MELD: 18  - no melena, hematochezia or hematemesis     Alcoholic Cirrhosis of the Liver  Chronic Hepatitis C  Hyperbilirubinemia  Elevated Liver Enzymes  - ALT normal, AST: 63; bili: 3; MELD: 18  - he reports that he drinks occasionally - his last drink was last weekend, denies any since  - no longer on naldolol, does not take any lactulose  - recommended complete alcohol cessation     H/o Esophageal Varices  - no longer taking protonix at home  - reports he had an episode of hematemesis last week which has since resolved  - will re-start Protonix here     Hx of Seizures  - admitted to [address] in 9/2020 for new onset seizures  - previously started on Keppra when admitted in 9/2020 but this was tapered off and discontinued at d/c; felt to be related to alcohol w/d; MRI/EEG were negative  - seizure precautions     DVT Prophylaxis: SCDs  Diet: ADULT DIET;  Regular  Code Status: Full Code         Axel Antonio MD, MD 9/20/2021 9:38 AM

## 2021-09-20 NOTE — PROGRESS NOTES
AM assessment complete. Gave am meds due see mar. Applied Lidocaine patch R Hip. Blood consent signed. Angel quiroz/SCD on. A/O x 4. SBA to BR. Refuses bed check. Compliant with call light. Bed locked/lowest position. Call light within reach.

## 2021-09-20 NOTE — CONSULTS
[address] [facility],  [address] [place] [address] Ave  [place], [address] [place] [address]  Phone: [phone]   Fax:[phone]        Trevor Mejias He is a  [2] White (non-) [1] 77 y.o. . male      Main Problems/Chief Complaint for which GI service is seeing pt     Cirrhosis of the liver. Anemia. Clinical Summary      The patient has history of cirrhosis of the liver and hepatitis C. In 2019 he had about a liter of hematemesis. He was seen by hepatologist at [facility]. He underwent an EGD with variceal ligation. He has been seeing the hepatologist for further care and had an EGD done last year. He has not had a real hematemesis since then. However, about 2 months ago, while brushing the teeth he spit out a mixture of fresh blood and toothpaste. He has not had a melanotic stool. He has been admitted with multiple problems including thrombocytopenia and neutropenia. There is no documented overt bleeding since the admission. According to him, he does not drink alcohol anymore on a regular basis, but drinks only once in a while.     PAST MEDICAL HISTORY     Past Medical History:   Diagnosis Date    COPD (chronic obstructive pulmonary disease) ([facility]     Hepatitis C antibody positive in blood 11/16/2017    History of blood transfusion     MI (myocardial infarction) ([facility] [address]      FAMILY HISTORY     Family History   Problem Relation Age of Onset    Other Mother        SURGICAL HISTORY     Past Surgical History:   Procedure Laterality Date    CARDIAC CATHETERIZATION      heart cath    COLONOSCOPY  11/17/2017    Initial Colonoscopy; Multiple Colon Polyps    ENDOSCOPY, COLON, DIAGNOSTIC      EYE SURGERY      cataract removal    VT NJX DX/THER SBST INTRLMNR CRV/THRC W/IMG GDN Right 8/13/2018    RIGHT CERVICAL SEVEN THORACIC ONE EPIDURAL STEROID INJECTION SITE CONFIRMED BY FLUOROSCOPY performed by Sammie Lynn MD at [address]  01/18/2018 Gastritis, [place]     CURRENT MEDICATIONS   (This list may include medications prescribed during this encounter as epic can not insert only the list prior to this encounter.)      ALLERGIES     Allergies   Allergen Reactions    Codeine Hives         PHYSICAL EXAM   Vitals as per nursing record. (Extensive examination of systems was not undertaken considering the patient's risk of catching infection.)  Neuro: A & O X3.  Psych: Good mood and memory. Pt is able to make appropriate decisions. FINAL IMPRESSION AND RECOMMENDATIONS     The patient has cirrhosis of the liver because of the combination of alcohol use and his hepatitis C. He needs EGD on a yearly basis. But it appears to me that he is already had 1 done by his established hepatologist last year. In addition, because of much higher risk of infection and other complications given his thrombocytopenia, anemia and neutropenia. I would defer any EGD or endoscopic intervention at this time. I have strongly advised the patient to not drink alcohol at all. The total time spent face-to-face, review of the record and on the fu during this visit was 30 minutes with more than 50% of the time spent in review of the electronic record and its independent analysis revealing pancytopenia with increased risk of bleeding and infection,  coordination of care and management of cirrhosis of the liver and counseling the  patient about not drinking alcohol at all and following up with his established hematologist.              Juan Acevedo MD 9/20/21 7:44 PM EDT    CC:  Referring Not In System (Inactive)      IMPORTANT: Please note that some portions of this note may have been created using Dragon voice recognition software. Some \"sound-alike\" and totally wrong word substitutions may have taken place due to known inherent limitations of any such software, including this voice recognition software.   In spite of efforts to eliminate such errors, some may not have been corrected. So please read the note with this in mind and recognize such mistakes and understand the correct version using the  context. Thanks.

## 2021-09-20 NOTE — PROGRESS NOTES
RESPIRATORY THERAPY ASSESSMENT    Name:  Hermes Lambert  Medical Record Number:  [id number]  Age: 77 y.o. Gender: male  : 1955  Today's Date:  2021  Room:  [address] [place] [address]-    Assessment     Is the patient being admitted for a COPD or Asthma exacerbation? No   (If yes the patient will be seen every 4 hours for the first 24 hours and then reassessed)    Patient Admission Diagnosis      Allergies  Allergies   Allergen Reactions    Codeine Hives       Minimum Predicted Vital Capacity:     na          Actual Vital Capacity:      na              Pulmonary History:COPD  Home Oxygen Therapy:  room air  Home Respiratory Therapy:None   Current Respiratory Therapy:  combivent q6          Respiratory Severity Index(RSI)   Patients with orders for inhalation medications, oxygen, or any therapeutic treatment modality will be placed on Respiratory Protocol. They will be assessed with the first treatment and at least every 72 hours thereafter. The following severity scale will be used to determine frequency of treatment intervention. Smoking History: Pulmonary Disease or Smoking History, Greater than 15 pack year = 2    Social History  Social History     Tobacco Use    Smoking status: Current Every Day Smoker     Packs/day: 1.00     Years: 45.00     Pack years: 45.00    Smokeless tobacco: Never Used    Tobacco comment: refused, is seeing primary physician. Substance Use Topics    Alcohol use:  Yes     Alcohol/week: 5.0 standard drinks     Types: 5 Cans of beer per week    Drug use: No       Recent Surgical History: None = 0  Past Surgical History  Past Surgical History:   Procedure Laterality Date    CARDIAC CATHETERIZATION      heart cath    COLONOSCOPY  2017    Initial Colonoscopy; Multiple Colon Polyps    ENDOSCOPY, COLON, DIAGNOSTIC      EYE SURGERY      cataract removal    GA NJX DX/THER SBST INTRLMNR CRV/THRC W/IMG GDN Right 2018    RIGHT CERVICAL SEVEN THORACIC ONE EPIDURAL STEROID INJECTION SITE CONFIRMED BY FLUOROSCOPY performed by Darrell Perez MD at [address]  01/18/2018    Gastritis, [place]       Level of Consciousness: Alert, Oriented, and Cooperative = 0    Level of Activity: Walking with assistance = 1    Respiratory Pattern: Regular Pattern; RR 8-20 = 0    Breath Sounds: Diminshed bilaterally and/or crackles = 2    Sputum   ,  ,    Cough: Strong, spontaneous, non-productive = 0    Vital Signs   /67   Pulse 82   Temp 98.5 °F (36.9 °C) (Oral)   Resp 16   Ht 5' 8\" (1.727 m)   Wt 165 lb 11.2 oz (75.2 kg)   SpO2 97%   BMI 25.19 kg/m²   SPO2 (COPD values may differ): Greater than or equal to 92% on room air = 0    Peak Flow (asthma only): not applicable = 0    RSI: 5-6 = Q4hr PRN (every four hours as needed) for dyspnea        Plan       Goals: medication delivery, mobilize retained secretions, volume expansion and improve oxygenation    Patient/caregiver was educated on the proper method of use for Respiratory Care Devices:  Yes      Level of patient/caregiver understanding able to:   ? Verbalize understanding   ? Demonstrate understanding       ? Teach back        ? Needs reinforcement       ? No available caregiver               ? Other:     Response to education:  Excellent     Is patient being placed on Home Treatment Regimen? No     Does the patient have everything they need prior to discharge? Yes     Comments: chart reviewed, patient assessed    Plan of Care: combivent q4prn per rsi    Electronically signed by Wilda Holder RCP on 9/20/2021 at 3:25 PM    Respiratory Protocol Guidelines     1. Assessment and treatment by Respiratory Therapy will be initiated for medication and therapeutic interventions upon initiation of aerosolized medication. 2. Physician will be contacted for respiratory rate (RR) greater than 35 breaths per minute.  Therapy will be held for heart rate (HR) greater than 140 beats per minute, pending direction from physician. 3. Bronchodilators will be administered via Metered Dose Inhaler (MDI) with spacer when the following criteria are met:  a. Alert and cooperative     b. HR < 140 bpm  c. RR < 30 bpm                d. Can demonstrate a 2-3 second inspiratory hold  4. Bronchodilators will be administered via Hand Held Nebulizer [facility]) to patients when ANY of the following criteria are met  a. Incognizant or uncooperative          b. Patients treated with HHN at Home        c. Unable to demonstrate proper use of MDI with spacer     d. RR > 30 bpm   5. Bronchodilators will be delivered via Metered Dose Inhaler (MDI), HHN, Aerogen to intubated patients on mechanical ventilation. 6. Inhalation medication orders will be delivered and/or substituted as outlined below. Aerosolized Medications Ordering and Administration Guidelines:    1. All Medications will be ordered by a physician, and their frequency and/or modality will be adjusted as defined by the patients Respiratory Severity Index (RSI) score. 2. If the patient does not have documented COPD, consider discontinuing anticholinergics when RSI is less than 9.  3. If the bronchospasm worsens (increased RSI), then the bronchodilator frequency can be increased to a maximum of every 4 hours. If greater than every 4 hours is required, the physician will be contacted. 4. If the bronchospasm improves, the frequency of the bronchodilator can be decreased, based on the patient's RSI, but not less than home treatment regimen frequency. 5. Bronchodilator(s) will be discontinued if patient has a RSI less than 9 and has received no scheduled or as needed treatment for 72  Hrs. Patients Ordered on a Mucolytic Agent:    1. Must always be administered with a bronchodilator. 2. Discontinue if patient experiences worsened bronchospasm, or secretions have lessened to the point that the patient is able to clear them with a cough.     Anti-inflammatory and Combination Medications:    1. If the patient lacks prior history of lung disease, is not using inhaled anti-inflammatory medication at home, and lacks wheezing by examination or by history for at least 24 hours, contact physician for possible discontinuation.

## 2021-09-20 NOTE — ACP (ADVANCE CARE PLANNING)
Advance Care Planning   Healthcare Decision Maker:    Primary Decision Maker: Oriana Mora - [facility] - [phone]    Click here to complete Healthcare Decision Makers including selection of the Healthcare Decision Maker Relationship (ie \"Primary\").

## 2021-09-20 NOTE — CONSULTS
I have received a GI consultation request for anemia and cirrhosis. The electronic medical record was reviewed. There is no record of another EGD after the 1 in 2018 that time able to see. According to the last EGD, there were no varices. If he has not had an EGD done in last 1 year's time, then he should have 1 done. the case is discussed with the patient's nurse. There is no overt bleeding, but there has been some drop in hemoglobin. This could be related to portal hypertensive gastropathy. The patient may need an EGD to look for varices, if there is no recent EGD performed. I will see the patient later today and discussed this with him. Continue supportive therapy and correction of anemia to maintain hemoglobin above 7. Full consult to follow.

## 2021-09-21 VITALS
WEIGHT: 165.7 LBS | RESPIRATION RATE: 18 BRPM | SYSTOLIC BLOOD PRESSURE: 116 MMHG | DIASTOLIC BLOOD PRESSURE: 73 MMHG | HEIGHT: 68 IN | OXYGEN SATURATION: 97 % | TEMPERATURE: 97.4 F | HEART RATE: 70 BPM | BODY MASS INDEX: 25.11 KG/M2

## 2021-09-21 LAB
BASOPHILS ABSOLUTE: 0 K/UL (ref 0–0.2)
BASOPHILS RELATIVE PERCENT: 1.1 %
EOSINOPHILS ABSOLUTE: 0 K/UL (ref 0–0.6)
EOSINOPHILS RELATIVE PERCENT: 2.4 %
HAPTOGLOBIN: 42 MG/DL (ref 30–200)
HCT VFR BLD CALC: 24.7 % (ref 40.5–52.5)
HEMOGLOBIN: 7.8 G/DL (ref 13.5–17.5)
LV EF: 53 %
LVEF MODALITY: NORMAL
LYMPHOCYTES ABSOLUTE: 0.6 K/UL (ref 1–5.1)
LYMPHOCYTES RELATIVE PERCENT: 30.2 %
MCH RBC QN AUTO: 27.2 PG (ref 26–34)
MCHC RBC AUTO-ENTMCNC: 31.5 G/DL (ref 31–36)
MCV RBC AUTO: 86.5 FL (ref 80–100)
MONOCYTES ABSOLUTE: 0.3 K/UL (ref 0–1.3)
MONOCYTES RELATIVE PERCENT: 16.4 %
NEUTROPHILS ABSOLUTE: 1 K/UL (ref 1.7–7.7)
NEUTROPHILS RELATIVE PERCENT: 49.9 %
PDW BLD-RTO: 21.7 % (ref 12.4–15.4)
PLATELET # BLD: 50 K/UL (ref 135–450)
PMV BLD AUTO: 8.6 FL (ref 5–10.5)
RBC # BLD: 2.85 M/UL (ref 4.2–5.9)
SOLUBLE TRANSFERRIN RECEPT: 13.3 MG/L (ref 2.2–5)
WBC # BLD: 2.1 K/UL (ref 4–11)

## 2021-09-21 PROCEDURE — G0378 HOSPITAL OBSERVATION PER HR: HCPCS

## 2021-09-21 PROCEDURE — 2580000003 HC RX 258: Performed by: INTERNAL MEDICINE

## 2021-09-21 PROCEDURE — 36415 COLL VENOUS BLD VENIPUNCTURE: CPT

## 2021-09-21 PROCEDURE — 99239 HOSP IP/OBS DSCHRG MGMT >30: CPT | Performed by: INTERNAL MEDICINE

## 2021-09-21 PROCEDURE — 6360000002 HC RX W HCPCS: Performed by: INTERNAL MEDICINE

## 2021-09-21 PROCEDURE — 93306 TTE W/DOPPLER COMPLETE: CPT

## 2021-09-21 PROCEDURE — 85025 COMPLETE CBC W/AUTO DIFF WBC: CPT

## 2021-09-21 PROCEDURE — 6370000000 HC RX 637 (ALT 250 FOR IP): Performed by: PHYSICIAN ASSISTANT

## 2021-09-21 PROCEDURE — 6370000000 HC RX 637 (ALT 250 FOR IP): Performed by: INTERNAL MEDICINE

## 2021-09-21 PROCEDURE — 96365 THER/PROPH/DIAG IV INF INIT: CPT

## 2021-09-21 RX ORDER — POLYETHYLENE GLYCOL 3350 17 G/17G
17 POWDER, FOR SOLUTION ORAL DAILY PRN
Qty: 527 G | Refills: 1 | Status: SHIPPED | OUTPATIENT
Start: 2021-09-21 | End: 2021-10-21

## 2021-09-21 RX ORDER — PANTOPRAZOLE SODIUM 40 MG/1
40 TABLET, DELAYED RELEASE ORAL
Qty: 30 TABLET | Refills: 3 | Status: SHIPPED | OUTPATIENT
Start: 2021-09-22 | End: 2022-02-23

## 2021-09-21 RX ORDER — FERROUS SULFATE 325(65) MG
325 TABLET ORAL
Qty: 12 TABLET | Refills: 5 | Status: SHIPPED | OUTPATIENT
Start: 2021-09-22 | End: 2022-10-10 | Stop reason: ALTCHOICE

## 2021-09-21 RX ADMIN — TRAMADOL HYDROCHLORIDE 50 MG: 50 TABLET, FILM COATED ORAL at 06:54

## 2021-09-21 RX ADMIN — IRON SUCROSE 200 MG: 20 INJECTION, SOLUTION INTRAVENOUS at 09:03

## 2021-09-21 RX ADMIN — PANTOPRAZOLE SODIUM 40 MG: 40 TABLET, DELAYED RELEASE ORAL at 06:51

## 2021-09-21 RX ADMIN — TRAMADOL HYDROCHLORIDE 50 MG: 50 TABLET, FILM COATED ORAL at 00:49

## 2021-09-21 RX ADMIN — SODIUM CHLORIDE, PRESERVATIVE FREE 10 ML: 5 INJECTION INTRAVENOUS at 09:02

## 2021-09-21 RX ADMIN — TRAMADOL HYDROCHLORIDE 50 MG: 50 TABLET, FILM COATED ORAL at 12:59

## 2021-09-21 ASSESSMENT — PAIN DESCRIPTION - LOCATION
LOCATION: HIP;KNEE
LOCATION: HIP;KNEE

## 2021-09-21 ASSESSMENT — PAIN SCALES - GENERAL
PAINLEVEL_OUTOF10: 7
PAINLEVEL_OUTOF10: 0
PAINLEVEL_OUTOF10: 7
PAINLEVEL_OUTOF10: 7

## 2021-09-21 ASSESSMENT — PAIN DESCRIPTION - ORIENTATION
ORIENTATION: RIGHT
ORIENTATION: RIGHT

## 2021-09-21 ASSESSMENT — PAIN DESCRIPTION - PAIN TYPE
TYPE: ACUTE PAIN
TYPE: ACUTE PAIN

## 2021-09-21 NOTE — FLOWSHEET NOTE
09/20/21 1943   Vital Signs   Temp 96.6 °F (35.9 °C)   Temp Source Oral   Pulse 84   Heart Rate Source Monitor   Resp 16   /77   BP Location Left upper arm   Patient Position Semi fowlers   Level of Consciousness Alert (0)   MEWS Score 1   Oxygen Therapy   SpO2 97 %   O2 Device None (Room air)   Pt A/O x 4 assessment completed. Meds given per MAR. Pt denies any needs at this time.  Call light within reach and bed alarm on

## 2021-09-21 NOTE — PROGRESS NOTES
Patient sitting up in bed eating voices no c/o IV site to right [facility] unremarkable denies pain/discomfort alert and oriented pleasant and cooperative

## 2021-09-21 NOTE — PROGRESS NOTES
Patient discharged at this time to home IV removed and discharge instructions gone over with patient understanding voiced

## 2021-09-21 NOTE — PLAN OF CARE
Problem: Falls - Risk of:  Goal: Will remain free from falls  Description: Will remain free from falls  Outcome: Completed  Goal: Absence of physical injury  Description: Absence of physical injury  Outcome: Completed     Problem: Pain:  Goal: Pain level will decrease  Description: Pain level will decrease  Outcome: Completed  Goal: Control of acute pain  Description: Control of acute pain  Outcome: Completed  Goal: Control of chronic pain  Description: Control of chronic pain  Outcome: Completed  Goal: Patient's pain/discomfort is manageable  Description: Patient's pain/discomfort is manageable  Outcome: Completed     Problem: Infection:  Goal: Will remain free from infection  Description: Will remain free from infection  Outcome: Completed     Problem: Safety:  Goal: Free from accidental physical injury  Description: Free from accidental physical injury  Outcome: Completed  Goal: Free from intentional harm  Description: Free from intentional harm  Outcome: Completed     Problem: Daily Care:  Goal: Daily care needs are met  Description: Daily care needs are met  Outcome: Completed     Problem: Skin Integrity:  Goal: Skin integrity will stabilize  Description: Skin integrity will stabilize  Outcome: Completed     Problem: Discharge Planning:  Goal: Patients continuum of care needs are met  Description: Patients continuum of care needs are met  Outcome: Completed

## 2021-09-21 NOTE — CONSULTS
ONCOLOGY FOLLOW-UP:         PROBLEM LIST:       Patient Active Problem List   Diagnosis Code    Open wound of finger with complication Y30.854K    Chronic dermatitis L30.9    Chest pain R07.9    Unstable angina (HCC) I20.0    Angina pectoris (HCC) I20.9    Alcohol abuse F10.10    Hypokalemia E87.6    Thrombocytopenia (HCC) D69.6    Macrocytosis D75.89    Elevated transaminase level R74.01    Coronary artery disease involving native coronary artery of native heart without angina pectoris I25.10    Elevated LFTs R79.89    Hyperkalemia E87.5    Rectal mass K62.89    Proctitis K62.89    Right sided weakness R53.1    Cervical disc disease with myelopathy M50.00    Numbness R20.0    Foraminal stenosis of cervical region M48.02    Abnormal EKG P11.36    Alcoholic cirrhosis of liver without ascites (HCC) K70.30    Pancytopenia (HCC) D61.818    Hyponatremia E87.1    Smoker F17.200    Weakness R53.1    Multiple falls R29.6    History of hepatitis C Z86.19    History of cirrhosis Z87.19    Right hip pain M25.551       INTERVAL HISTORY:       Remains admitted. Afebrile. Transfused PRBC yesterday. REVIEW OF SYSTEMS:       10 point ROS completed. Pertinent positives in HPI, otherwise negative.      PHYSICAL EXAM:       Vitals:    09/21/21 0217   BP: 113/73   Pulse: 72   Resp: 16   Temp: 97 °F (36.1 °C)   SpO2: 95%       General appearance: alert and cooperative  Head: Normocephalic, without obvious abnormality, atraumatic  Neck: No palpable lymphadenopathy in supraclavicular or cervical chains  Lungs: Clear to auscultation bilaterally, no audible rales, wheezes or crackles  Heart: Regular rate and rhythm, S1, S2 normal  Abdomen: Soft, non-tender; bowel sounds normal; no masses,  no organomegaly  Extremities: without cyanosis, clubbing, edema or asymmetry  Skin: No jaundice, purpura or petechiae      LABS:     Lab Results   Component Value Date    WBC 2.1 (L) 09/21/2021    HGB 7.8 (L) 09/21/2021 folate, hemolytic panel, flow cytometry  - appears iron deficient on labs  - start Link Mir MD

## 2021-09-21 NOTE — DISCHARGE INSTR - DIET

## 2021-09-21 NOTE — DISCHARGE SUMMARY
Name:  Elizabeth Conrad  Room:  0846/2122-22  MRN:    [id number]    Discharge Summary      This discharge summary is in conjunction with a complete physical exam done on the day of discharge. Discharging Physician: Ravindra West MD       Admit: 9/18/2021  Discharge:   9/21/2021     Diagnoses this Admission    Active Problems:    Alcohol abuse    Thrombocytopenia ([address]    Coronary artery disease involving native coronary artery of native heart without angina pectoris    Alcoholic cirrhosis of liver without ascites (HCC)    Hyponatremia    Weakness    Multiple falls    History of hepatitis C  Resolved Problems:    * No resolved hospital problems. *      Procedures (Please Review Full Report for Details)      none    Consults    Hem Onc  GI    HPI:      The patient is a 77 y.o. male with a PMH of Alcoholic Cirrhosis of the liver, Hepatitis C, Chronic TCP, H/o esophageal varices, Seizure DO and COPD who presented to [facility] ED with complaint of right hip pain following a fall that occurred about a week ago. He reports that he fell because he was feeling dizzy. He landed on his right hip. He did not hit his head or lose consciousness. States he has a hx of recurrent falls over the last couple of months. Previously he was on Naldolol and this was discontinued d/t recurrent falls. He states this seemed to help quite a bit but he still has the occasional dizzy spell. No room spinning sensation or hx of vertigo. He does note that dizziness tends to come on after he has changed position from sitting to standing usually once he starts walking. He does have a hx of alcohol abuse but states he was not drinking when his symptoms came on. His last drink was last Sunday. Has occasional chest pain and SOB. Denies N/V/D, melena or hematochezia. Reported last week he did have an episode of hematemesis that has since resolved. Emesis was bright red in color. He does not currently take any medications.  No seizure like activity Labs     09/18/21  1500   PROTIME 18.1*   INR 1.57*     APTT: No results for input(s): APTT in the last 72 hours. UA:  Recent Labs     09/18/21  1550   COLORU Yellow   PHUR 8.0   CLARITYU Clear   SPECGRAV 1.015   LEUKOCYTESUR Negative   UROBILINOGEN 4.0*   BILIRUBINUR SMALL*   BLOODU Negative   GLUCOSEU Negative    Results for Gareth Menon (MRN [id number]) as of 9/21/2021 07:59   Ref. Range 9/20/2021 09:58   Ferritin Latest Ref Range: 30.0 - 400.0 ng/mL 17.0 (L)   Iron Latest Ref Range: 59 - 158 ug/dL 29 (L)   Iron Saturation Latest Ref Range: 20 - 50 % 7 (L)   TIBC Latest Ref Range: 260 - 445 ug/dL 390   Folate Latest Ref Range: 4.78 - 24.20 ng/mL 19.19   Vitamin B-12 Latest Ref Range: 211 - 911 pg/mL 929 (H)   Results for Gareth Menon (MRN [id number]) as of 9/21/2021 07:59   Ref. Range 9/18/2021 16:05   INFLUENZA A Latest Ref Range: NOT DETECTED  NOT DETECTED   INFLUENZA B Latest Ref Range: NOT DETECTED  NOT DETECTED   SARS-CoV-2 RNA, RT PCR Latest Ref Range: NOT DETECTED  NOT DETECTED       XR ANKLE RIGHT (MIN 3 VIEWS)   Final Result   No acute bony or joint abnormalities seen in the pelvis, right hip, right   ankle or right knee         XR KNEE RIGHT (1-2 VIEWS)   Final Result   No acute bony or joint abnormalities seen in the pelvis, right hip, right   ankle or right knee         XR HIP RIGHT (2-3 VIEWS)   Final Result   No acute bony or joint abnormalities seen in the pelvis, right hip, right   ankle or right knee         XR CHEST PORTABLE   Final Result   Negative chest radiograph. ECHO 9/21/21  Conclusions      Summary   Ectopy throughout the study. Left ventricular systolic function is normal with ejection fraction   estimated at 50-55 %. No regional wall motion abnormalities are noted. Grade I diastolic dysfunction with normal filling pressure. The aortic root is mildly dilated at 3.9cm. The right atrium is mildly dilated. Mild mitral regurgitation.    Aortic valve sclerosis without aortic stenosis. Mild tricuspid regurgitation. Normal systolic pulmonary artery pressure (SPAP) estimated at 21 mmHg (RA   pressure 8 mmHg). Mild pulmonic regurgitation present.   7-9-2018 55-60% EF, DD1, AV sclerosis, negative bubble study      Signature      ------------------------------------------------------------------   Electronically signed by Deya Meza MD, [address] (Interpreting   physician) on 09/21/2021 at 11:16 AM   ------------------------------------------------------------------    Discharge Medications     Medication List      START taking these medications    albuterol-ipratropium  MCG/ACT Aers inhaler  Commonly known as: COMBIVENT RESPIMAT  Inhale 1 puff into the lungs every 6 hours as needed for Wheezing     ferrous sulfate 325 (65 Fe) MG tablet  Commonly known as: IRON 325  Take 1 tablet by mouth three times a week  Start taking on: September 22, 2021     pantoprazole 40 MG tablet  Commonly known as: PROTONIX  Take 1 tablet by mouth every morning (before breakfast)  Start taking on: September 22, 2021  Replaces: pantoprazole sodium 40 MG Pack packet     polyethylene glycol 17 g packet  Commonly known as: GLYCOLAX  Take 17 g by mouth daily as needed for Constipation        STOP taking these medications    pantoprazole sodium 40 MG Pack packet  Commonly known as: PROTONIX  Replaced by: pantoprazole 40 MG tablet           Where to Get Your Medications      These medications were sent to [facility], [address]  [address] [place] [address]    Phone: [phone]   · albuterol-ipratropium  MCG/ACT Aers inhaler  · ferrous sulfate 325 (65 Fe) MG tablet  · pantoprazole 40 MG tablet  · polyethylene glycol 17 g packet           Discharge Condition/Location: Stable    Follow Up: Follow up with PCP.     More than 30 mts spent    [facility], MD 9/21/2021 8:04 AM

## 2021-09-21 NOTE — PROGRESS NOTES
Transfer report received from [facility]. Patient resting in bed awake. Patient c/o pain to the Rt hip/leg, PRN Tramadol given. Call light and bedside table is easy reach. Will continue to monitor.

## 2021-09-21 NOTE — PROGRESS NOTES
Care transferred to [facility] Pt appears to be sleeping at this time as manifested by eyes being closed. Respirations easy. No distress noted. Will continue to monitor.

## 2021-09-21 NOTE — PLAN OF CARE
Problem: Falls - Risk of:  Goal: Will remain free from falls  Description: Will remain free from falls  Outcome: Ongoing  Goal: Absence of physical injury  Description: Absence of physical injury  Outcome: Ongoing     Problem: Pain:  Goal: Pain level will decrease  Description: Pain level will decrease  Outcome: Ongoing  Goal: Control of acute pain  Description: Control of acute pain  Outcome: Ongoing     Problem: Infection:  Goal: Will remain free from infection  Description: Will remain free from infection  Outcome: Ongoing     Problem: Daily Care:  Goal: Daily care needs are met  Description: Daily care needs are met  Outcome: Ongoing     Problem: Skin Integrity:  Goal: Skin integrity will stabilize  Description: Skin integrity will stabilize  Outcome: Ongoing     Problem: Discharge Planning:  Goal: Patients continuum of care needs are met  Description: Patients continuum of care needs are met  Outcome: Ongoing

## 2021-10-12 ENCOUNTER — OFFICE VISIT (OUTPATIENT)
Dept: INTERNAL MEDICINE CLINIC | Age: 66
End: 2021-10-12

## 2021-10-12 VITALS
BODY MASS INDEX: 23.04 KG/M2 | DIASTOLIC BLOOD PRESSURE: 70 MMHG | HEART RATE: 118 BPM | RESPIRATION RATE: 12 BRPM | HEIGHT: 68 IN | SYSTOLIC BLOOD PRESSURE: 120 MMHG | WEIGHT: 152 LBS

## 2021-10-12 DIAGNOSIS — E87.1 HYPONATREMIA: ICD-10-CM

## 2021-10-12 DIAGNOSIS — R53.83 FATIGUE, UNSPECIFIED TYPE: ICD-10-CM

## 2021-10-12 DIAGNOSIS — F10.10 ALCOHOL ABUSE: ICD-10-CM

## 2021-10-12 DIAGNOSIS — D61.818 PANCYTOPENIA (HCC): ICD-10-CM

## 2021-10-12 DIAGNOSIS — R10.84 GENERALIZED ABDOMINAL PAIN: Primary | ICD-10-CM

## 2021-10-12 DIAGNOSIS — K70.30 ALCOHOLIC CIRRHOSIS OF LIVER WITHOUT ASCITES (HCC): ICD-10-CM

## 2021-10-12 DIAGNOSIS — I25.10 CORONARY ARTERY DISEASE INVOLVING NATIVE CORONARY ARTERY OF NATIVE HEART WITHOUT ANGINA PECTORIS: ICD-10-CM

## 2021-10-12 DIAGNOSIS — Z86.19 HISTORY OF HEPATITIS C: ICD-10-CM

## 2021-10-12 DIAGNOSIS — D69.6 THROMBOCYTOPENIA (HCC): ICD-10-CM

## 2021-10-12 DIAGNOSIS — R00.0 TACHYCARDIA: ICD-10-CM

## 2021-10-12 PROCEDURE — 1123F ACP DISCUSS/DSCN MKR DOCD: CPT | Performed by: INTERNAL MEDICINE

## 2021-10-12 PROCEDURE — G8484 FLU IMMUNIZE NO ADMIN: HCPCS | Performed by: INTERNAL MEDICINE

## 2021-10-12 PROCEDURE — G8420 CALC BMI NORM PARAMETERS: HCPCS | Performed by: INTERNAL MEDICINE

## 2021-10-12 PROCEDURE — 4004F PT TOBACCO SCREEN RCVD TLK: CPT | Performed by: INTERNAL MEDICINE

## 2021-10-12 PROCEDURE — 3017F COLORECTAL CA SCREEN DOC REV: CPT | Performed by: INTERNAL MEDICINE

## 2021-10-12 PROCEDURE — 4040F PNEUMOC VAC/ADMIN/RCVD: CPT | Performed by: INTERNAL MEDICINE

## 2021-10-12 PROCEDURE — 99215 OFFICE O/P EST HI 40 MIN: CPT | Performed by: INTERNAL MEDICINE

## 2021-10-12 PROCEDURE — 93000 ELECTROCARDIOGRAM COMPLETE: CPT | Performed by: INTERNAL MEDICINE

## 2021-10-12 PROCEDURE — G8427 DOCREV CUR MEDS BY ELIG CLIN: HCPCS | Performed by: INTERNAL MEDICINE

## 2021-10-12 ASSESSMENT — PATIENT HEALTH QUESTIONNAIRE - PHQ9
SUM OF ALL RESPONSES TO PHQ9 QUESTIONS 1 & 2: 0
1. LITTLE INTEREST OR PLEASURE IN DOING THINGS: 0
SUM OF ALL RESPONSES TO PHQ QUESTIONS 1-9: 0
2. FEELING DOWN, DEPRESSED OR HOPELESS: 0

## 2021-10-12 ASSESSMENT — ENCOUNTER SYMPTOMS
NAUSEA: 0
VOMITING: 0
ABDOMINAL DISTENTION: 1
RHINORRHEA: 0
BACK PAIN: 0
ABDOMINAL PAIN: 1
SHORTNESS OF BREATH: 0
WHEEZING: 0

## 2021-10-12 NOTE — PROGRESS NOTES
Alexus Pinto (:  1955) is a 77 y.o. male,New patient, here for evaluation of the following chief complaint(s):  Establish Care         ASSESSMENT/PLAN:  1. Generalized abdominal pain-history of liver disease. No ascites. Await CT results. -     CT ABDOMEN PELVIS W IV CONTRAST Additional Contrast? Radiologist Recommendation; Future  2. Thrombocytopenia (HCC)-check labs   3. Coronary artery disease involving native coronary artery of native heart without angina pectoris-recent stress test normal  4. Pancytopenia (HCC)-check labs. Due to liver disease. 5. Hyponatremia  6. History of hepatitis C  7. Alcoholic cirrhosis of liver without ascites (HCC)  -     COMPREHENSIVE METABOLIC PANEL; Future  -     CBC WITH AUTO DIFFERENTIAL; Future  -     URIC ACID; Future  -     TSH with Reflex; Future  -     VITAMIN B12; Future  -     CT ABDOMEN PELVIS W IV CONTRAST Additional Contrast? Radiologist Recommendation; Future  8. Tachycardia-EKG reviewed. -     EKG 12 Lead; Future  9. Alcohol abuse  10. Fatigue, unspecified type-await blood work. -     COMPREHENSIVE METABOLIC PANEL; Future  -     CBC WITH AUTO DIFFERENTIAL; Future  -     URIC ACID; Future  -     TSH with Reflex; Future  -     VITAMIN B12; Future      Follow up with PCP. Subjective   SUBJECTIVE/OBJECTIVE:  HPI     Patient is here to establish care. He was admitted to [facility] for alcoholic cirrhosis, hepatitis C, esophageal varices, seizure disorder, COPD who was admitted to [facility] for a fall and right hip pain. He is feeling somewhat light headed. He is drinking some alcohol. He is drinking fluids. He had pancytopenia. Work up showed orthostasis and acute anemia. He got one unit of PRBC. He had a stress test in 2021. It was normal. He has history of seeing a hepatologist. He used to see Dr Margot Farah. He thinks he has had a heart attack before but not sure. He has abdominal pain. It is somewhat diffuse. Symptoms for one month.  He has emesis. Review of Systems   Constitutional: Negative for activity change and appetite change. HENT: Negative for postnasal drip and rhinorrhea. Respiratory: Negative for shortness of breath and wheezing. Cardiovascular: Negative for chest pain, palpitations and leg swelling. Gastrointestinal: Positive for abdominal distention and abdominal pain. Negative for nausea and vomiting. Genitourinary: Negative for difficulty urinating and frequency. Musculoskeletal: Negative for back pain and joint swelling. Skin: Negative for rash. Neurological: Negative for light-headedness. Psychiatric/Behavioral: Negative for sleep disturbance. Past Medical History:   Diagnosis Date    COPD [facility]chronic obstructive [facility] disease) [facility]     Hepatitis C antibody positive in blood 11/16/2017    History of blood transfusion     [facility]        Past Surgical History:   Procedure Laterality Date    CARDIAC CATHETERIZATION      heart cath    COLONOSCOPY  11/17/2017    Initial Colonoscopy; Multiple Colon Polyps    ENDOSCOPY, COLON, DIAGNOSTIC      EYE SURGERY      cataract removal    RI NJX DX/THER SBST INTRLMNR CRV/THRC W/IMG GDN Right 8/13/2018    RIGHT CERVICAL SEVEN THORACIC ONE EPIDURAL STEROID INJECTION SITE CONFIRMED BY FLUOROSCOPY performed by Malia Ingram MD at [address]  01/18/2018    Gastritis, [place]       Family History   Problem Relation Age of Onset    Other Mother     Cancer Mother     High Blood Pressure Father        Social History     Tobacco Use    Smoking status: Current Every Day Smoker     Packs/day: 1.00     Years: 50.00     Pack years: 50.00    Smokeless tobacco: Never Used    Tobacco comment: refused, is seeing primary physician. Substance Use Topics    Alcohol use:  Yes     Alcohol/week: 1.0 standard drinks     Types: 1 Cans of beer per week    Drug use: No       Current Outpatient normal.                  An electronic signature was used to authenticate this note.     --Kenya Traylor MD

## 2021-10-21 ENCOUNTER — TELEPHONE (OUTPATIENT)
Dept: INTERNAL MEDICINE CLINIC | Age: 66
End: 2021-10-21

## 2021-10-21 ENCOUNTER — HOSPITAL ENCOUNTER (OUTPATIENT)
Dept: CT IMAGING | Age: 66
Discharge: HOME | End: 2021-10-21
Payer: MEDICARE

## 2021-10-21 DIAGNOSIS — R10.84 GENERALIZED ABDOMINAL PAIN: ICD-10-CM

## 2021-10-21 DIAGNOSIS — K70.30 ALCOHOLIC CIRRHOSIS OF LIVER WITHOUT ASCITES (HCC): ICD-10-CM

## 2021-10-21 PROCEDURE — 74177 CT ABD & PELVIS W/CONTRAST: CPT

## 2021-10-21 PROCEDURE — 6360000004 HC RX CONTRAST MEDICATION: Performed by: INTERNAL MEDICINE

## 2021-10-21 RX ADMIN — IOPAMIDOL 75 ML: 755 INJECTION, SOLUTION INTRAVENOUS at 11:48

## 2021-10-21 RX ADMIN — IOHEXOL 50 ML: 240 INJECTION, SOLUTION INTRATHECAL; INTRAVASCULAR; INTRAVENOUS; ORAL at 11:48

## 2021-10-21 NOTE — TELEPHONE ENCOUNTER
----- Message from Mariela South MD sent at 10/21/2021  5:09 PM EDT -----  Cirrhosis noted, can wait  ----- Message -----  From: Gay Baird  Sent: 10/21/2021   5:06 PM EDT  To: Mariela South MD    CT impression for Dr BECERRA patient. Unsure if can wait \"colitis not excluded\"    IMPRESSION:  Cirrhotic liver morphology without focal liver lesion. Stigmata of portal  hypertension with varicosities to the upper abdomen.     Question some mild wall thickening of cecum and portions of ascending colon. This could just relate to under distention but nonspecific  infectious/inflammatory colitis not excluded.

## 2021-11-15 ENCOUNTER — CLINICAL DOCUMENTATION (OUTPATIENT)
Dept: OTHER | Age: 66
End: 2021-11-15

## 2021-11-16 ENCOUNTER — OFFICE VISIT (OUTPATIENT)
Dept: INTERNAL MEDICINE CLINIC | Age: 66
End: 2021-11-16

## 2021-11-16 VITALS
HEIGHT: 68 IN | HEART RATE: 70 BPM | WEIGHT: 160 LBS | BODY MASS INDEX: 24.25 KG/M2 | SYSTOLIC BLOOD PRESSURE: 120 MMHG | DIASTOLIC BLOOD PRESSURE: 80 MMHG | RESPIRATION RATE: 12 BRPM

## 2021-11-16 DIAGNOSIS — M25.511 CHRONIC RIGHT SHOULDER PAIN: ICD-10-CM

## 2021-11-16 DIAGNOSIS — Z87.891 PERSONAL HISTORY OF TOBACCO USE: ICD-10-CM

## 2021-11-16 DIAGNOSIS — G89.29 CHRONIC RIGHT SHOULDER PAIN: ICD-10-CM

## 2021-11-16 DIAGNOSIS — K70.30 ALCOHOLIC CIRRHOSIS OF LIVER WITHOUT ASCITES (HCC): Primary | ICD-10-CM

## 2021-11-16 PROCEDURE — 4040F PNEUMOC VAC/ADMIN/RCVD: CPT | Performed by: INTERNAL MEDICINE

## 2021-11-16 PROCEDURE — G8427 DOCREV CUR MEDS BY ELIG CLIN: HCPCS | Performed by: INTERNAL MEDICINE

## 2021-11-16 PROCEDURE — 1123F ACP DISCUSS/DSCN MKR DOCD: CPT | Performed by: INTERNAL MEDICINE

## 2021-11-16 PROCEDURE — G8484 FLU IMMUNIZE NO ADMIN: HCPCS | Performed by: INTERNAL MEDICINE

## 2021-11-16 PROCEDURE — 99213 OFFICE O/P EST LOW 20 MIN: CPT | Performed by: INTERNAL MEDICINE

## 2021-11-16 PROCEDURE — G8420 CALC BMI NORM PARAMETERS: HCPCS | Performed by: INTERNAL MEDICINE

## 2021-11-16 PROCEDURE — 3017F COLORECTAL CA SCREEN DOC REV: CPT | Performed by: INTERNAL MEDICINE

## 2021-11-16 PROCEDURE — 4004F PT TOBACCO SCREEN RCVD TLK: CPT | Performed by: INTERNAL MEDICINE

## 2021-11-16 PROCEDURE — G0296 VISIT TO DETERM LDCT ELIG: HCPCS | Performed by: INTERNAL MEDICINE

## 2021-11-16 ASSESSMENT — PATIENT HEALTH QUESTIONNAIRE - PHQ9
SUM OF ALL RESPONSES TO PHQ9 QUESTIONS 1 & 2: 0
SUM OF ALL RESPONSES TO PHQ QUESTIONS 1-9: 0
2. FEELING DOWN, DEPRESSED OR HOPELESS: 0
1. LITTLE INTEREST OR PLEASURE IN DOING THINGS: 0
SUM OF ALL RESPONSES TO PHQ QUESTIONS 1-9: 0
SUM OF ALL RESPONSES TO PHQ QUESTIONS 1-9: 0

## 2021-11-16 ASSESSMENT — ENCOUNTER SYMPTOMS: ABDOMINAL PAIN: 0

## 2021-11-16 NOTE — PROGRESS NOTES
Cristobal Barrera (:  1955) is a 77 y.o. male,Established patient, here for evaluation of the following chief complaint(s):  Follow-up         ASSESSMENT/PLAN:  1. Alcoholic cirrhosis of liver without ascites ([address]  2. Chronic right shoulder pain  3. Personal history of tobacco use  -     MN VISIT TO DISCUSS LUNG CA SCREEN W LDCT  -     CT Lung Screen (Annual); Future    See Dr Tino Humphries. I gave him his number and tried to schedule an online request for an appointment. Refer to Dr Cedrick Person for his right shoulder. Schedule Ct of lung. No follow-ups on file. Subjective   SUBJECTIVE/OBJECTIVE:  HPI     Patient is here for follow up. He is feeling better. No abdominal pain. CT abdomen did not an acute changes. He has chronic right shoulder pain and wants to see ortho. He has not seen his liver doctor yet. Review of Systems   Gastrointestinal: Negative for abdominal pain. Musculoskeletal:        See hpi           Current Outpatient Medications   Medication Instructions    albuterol-ipratropium (COMBIVENT RESPIMAT)  MCG/ACT AERS inhaler 1 puff, Inhalation, EVERY 6 HOURS PRN    ferrous sulfate (IRON 325) 325 mg, Oral, THREE TIMES WEEKLY    pantoprazole (PROTONIX) 40 mg, Oral, DAILY BEFORE BREAKFAST       There are no changes to past medical history, family history, social history or review of systems(except as noted in the history section) since prior note (all reviewed with patient). /80 (Site: Right Upper Arm, Position: Sitting, Cuff Size: Medium Adult)   Pulse 70   Resp 12   Ht 5' 8\" (1.727 m)   Wt 160 lb (72.6 kg)   BMI 24.33 kg/m²       Objective   Physical Exam  Constitutional:       Appearance: He is well-developed. HENT:      Head: Normocephalic and atraumatic. Eyes:      General: No scleral icterus. Conjunctiva/sclera: Conjunctivae normal.      Pupils: Pupils are equal, round, and reactive to light. Neck:      Thyroid: No thyromegaly.    Cardiovascular: Rate and Rhythm: Normal rate and regular rhythm. Heart sounds: No murmur heard. Pulmonary:      Effort: Pulmonary effort is normal.      Breath sounds: Normal breath sounds. No wheezing. Musculoskeletal:      Right shoulder: Deformity and tenderness present. No swelling. Decreased range of motion. Cervical back: Normal range of motion and neck supple. Lymphadenopathy:      Cervical: No cervical adenopathy. An electronic signature was used to authenticate this note. --Paulina Gonzales MD   Low Dose CT (LDCT) Lung Screening criteria met:     Age 55-77(Medicare) or 50-80 ([facility])   Pack year smoking >30 (Medicare) or >20 ([facility])   Still smoking or less than 15 year since quit   No sign or symptoms of lung cancer   > 11 months since last LDCT     Risks and benefits of lung cancer screening with LDCT scans discussed:    Significance of positive screen - False-positive LDCT results often occur. 95% of all positive results do not lead to a diagnosis of cancer. Usually further imaging can resolve most false-positive results; however, some patients may require invasive procedures. Over diagnosis risk - 10% to 12% of screen-detected lung cancer cases are over diagnosedthat is, the cancer would not have been detected in the patient's lifetime without the screening. Need for follow up screens annually to continue lung cancer screening effectiveness     Risks associated with radiation from annual LDCT- Radiation exposure is about the same as for a mammogram, which is about 1/3 of the annual background radiation exposure from everyday life. Starting screening at age 54 is not likely to increase cancer risk from radiation exposure. Patients with comorbidities resulting in life expectancy of < 10 years, or that would preclude treatment of an abnormality identified on CT, should not be screened due to lack of benefit.     To obtain maximal benefit from this screening, smoking cessation and long-term abstinence from smoking is critical

## 2022-01-15 ENCOUNTER — CLINICAL DOCUMENTATION (OUTPATIENT)
Dept: OTHER | Age: 67
End: 2022-01-15

## 2022-02-23 RX ORDER — PANTOPRAZOLE SODIUM 40 MG/1
TABLET, DELAYED RELEASE ORAL
Qty: 30 TABLET | Refills: 0 | Status: SHIPPED | OUTPATIENT
Start: 2022-02-23

## 2022-03-03 ENCOUNTER — TELEPHONE (OUTPATIENT)
Dept: ORTHOPEDIC SURGERY | Age: 67
End: 2022-03-03

## 2022-04-13 ENCOUNTER — TELEPHONE (OUTPATIENT)
Dept: ORTHOPEDIC SURGERY | Age: 67
End: 2022-04-13

## 2022-04-13 NOTE — TELEPHONE ENCOUNTER
Attempted to contact patient to inform them about the [facility] [id number] joint pain program.    To provide optimal care, [facility] [address], in collaboration with our Primary Care Providers, are excited to update our [facility] ([facility]) patients on our joint pain program.    In the past have you received injections in your knees? Or are you currently experiencing knee pain that is impacting your daily activities or quality of life? For more information about what [facility] [place] [address] can offer to you or to set up an appointment with a joint pain specialist, please call us back at [phone].

## 2022-04-20 RX ORDER — PANTOPRAZOLE SODIUM 40 MG/1
TABLET, DELAYED RELEASE ORAL
Qty: 30 TABLET | Refills: 0 | OUTPATIENT
Start: 2022-04-20

## 2022-04-21 RX ORDER — PANTOPRAZOLE SODIUM 40 MG/1
TABLET, DELAYED RELEASE ORAL
Qty: 30 TABLET | Refills: 0 | OUTPATIENT
Start: 2022-04-21

## 2022-04-22 ENCOUNTER — TELEPHONE (OUTPATIENT)
Dept: INTERNAL MEDICINE CLINIC | Age: 67
End: 2022-04-22

## 2022-04-22 NOTE — TELEPHONE ENCOUNTER
----- Message from Melina Norwood MD sent at 4/22/2022 10:12 AM EDT -----  Contact: Alexa Mclean [phone]  Work him in  ----- Message -----  From: Lennox Sauce  Sent: 4/21/2022   2:58 PM EDT  To: Melina Norwood MD    Patient is having shoulder surgery at [facility] on 5/6/22 and needs a H&P. When can you see him?     Thank you

## 2022-05-03 ENCOUNTER — OFFICE VISIT (OUTPATIENT)
Dept: INTERNAL MEDICINE CLINIC | Age: 67
End: 2022-05-03

## 2022-05-03 VITALS
RESPIRATION RATE: 12 BRPM | BODY MASS INDEX: 24.25 KG/M2 | SYSTOLIC BLOOD PRESSURE: 120 MMHG | DIASTOLIC BLOOD PRESSURE: 80 MMHG | WEIGHT: 160 LBS | HEIGHT: 68 IN | HEART RATE: 70 BPM

## 2022-05-03 DIAGNOSIS — Z01.818 PREOP EXAM FOR INTERNAL MEDICINE: ICD-10-CM

## 2022-05-03 DIAGNOSIS — K70.30 ALCOHOLIC CIRRHOSIS OF LIVER WITHOUT ASCITES (HCC): ICD-10-CM

## 2022-05-03 DIAGNOSIS — D69.6 THROMBOCYTOPENIA (HCC): ICD-10-CM

## 2022-05-03 DIAGNOSIS — Z01.818 PREOP EXAM FOR INTERNAL MEDICINE: Primary | ICD-10-CM

## 2022-05-03 DIAGNOSIS — D61.818 PANCYTOPENIA (HCC): ICD-10-CM

## 2022-05-03 LAB
A/G RATIO: 1 (ref 1.1–2.2)
ALBUMIN SERPL-MCNC: 4 G/DL (ref 3.4–5)
ALP BLD-CCNC: 146 U/L (ref 40–129)
ALT SERPL-CCNC: 27 U/L (ref 10–40)
ANION GAP SERPL CALCULATED.3IONS-SCNC: 18 MMOL/L (ref 3–16)
AST SERPL-CCNC: 52 U/L (ref 15–37)
BASOPHILS ABSOLUTE: 0.1 K/UL (ref 0–0.2)
BASOPHILS RELATIVE PERCENT: 2.9 %
BILIRUB SERPL-MCNC: 2.1 MG/DL (ref 0–1)
BUN BLDV-MCNC: 14 MG/DL (ref 7–20)
CALCIUM SERPL-MCNC: 9 MG/DL (ref 8.3–10.6)
CHLORIDE BLD-SCNC: 103 MMOL/L (ref 99–110)
CO2: 17 MMOL/L (ref 21–32)
CREAT SERPL-MCNC: 0.7 MG/DL (ref 0.8–1.3)
EOSINOPHILS ABSOLUTE: 0 K/UL (ref 0–0.6)
EOSINOPHILS RELATIVE PERCENT: 1.4 %
GFR AFRICAN AMERICAN: >60
GFR NON-AFRICAN AMERICAN: >60
GLUCOSE BLD-MCNC: 140 MG/DL (ref 70–99)
HCT VFR BLD CALC: 29.8 % (ref 40.5–52.5)
HEMOGLOBIN: 8.7 G/DL (ref 13.5–17.5)
LYMPHOCYTES ABSOLUTE: 0.4 K/UL (ref 1–5.1)
LYMPHOCYTES RELATIVE PERCENT: 21 %
MCH RBC QN AUTO: 24.1 PG (ref 26–34)
MCHC RBC AUTO-ENTMCNC: 29.4 G/DL (ref 31–36)
MCV RBC AUTO: 82.1 FL (ref 80–100)
MONOCYTES ABSOLUTE: 0.4 K/UL (ref 0–1.3)
MONOCYTES RELATIVE PERCENT: 17.6 %
NEUTROPHILS ABSOLUTE: 1.2 K/UL (ref 1.7–7.7)
NEUTROPHILS RELATIVE PERCENT: 57.1 %
PDW BLD-RTO: 22.5 % (ref 12.4–15.4)
PLATELET # BLD: 115 K/UL (ref 135–450)
PMV BLD AUTO: 7.8 FL (ref 5–10.5)
POTASSIUM SERPL-SCNC: 3.7 MMOL/L (ref 3.5–5.1)
RBC # BLD: 3.63 M/UL (ref 4.2–5.9)
SODIUM BLD-SCNC: 138 MMOL/L (ref 136–145)
TOTAL PROTEIN: 8 G/DL (ref 6.4–8.2)
URIC ACID, SERUM: 6.5 MG/DL (ref 3.5–7.2)
WBC # BLD: 2.1 K/UL (ref 4–11)

## 2022-05-03 PROCEDURE — G8427 DOCREV CUR MEDS BY ELIG CLIN: HCPCS | Performed by: INTERNAL MEDICINE

## 2022-05-03 PROCEDURE — 4040F PNEUMOC VAC/ADMIN/RCVD: CPT | Performed by: INTERNAL MEDICINE

## 2022-05-03 PROCEDURE — 99214 OFFICE O/P EST MOD 30 MIN: CPT | Performed by: INTERNAL MEDICINE

## 2022-05-03 PROCEDURE — 1123F ACP DISCUSS/DSCN MKR DOCD: CPT | Performed by: INTERNAL MEDICINE

## 2022-05-03 PROCEDURE — G8420 CALC BMI NORM PARAMETERS: HCPCS | Performed by: INTERNAL MEDICINE

## 2022-05-03 PROCEDURE — 3017F COLORECTAL CA SCREEN DOC REV: CPT | Performed by: INTERNAL MEDICINE

## 2022-05-03 PROCEDURE — 4004F PT TOBACCO SCREEN RCVD TLK: CPT | Performed by: INTERNAL MEDICINE

## 2022-05-03 PROCEDURE — 93000 ELECTROCARDIOGRAM COMPLETE: CPT | Performed by: INTERNAL MEDICINE

## 2022-05-03 NOTE — PROGRESS NOTES
Subjective:      Jo Cabrera is a 79 y.o. male who presents to the office today for a preoperative consultation at the request of surgeon Dr Aaliyah Hurtado who plans on performing Right shoulder VA, subacromial decompression, Distal clavicle resection and rotator cuff repair. Planned anesthesia is Regional and General.       Past Medical History:   Diagnosis Date    COPD (chronic obstructive pulmonary disease) ([facility]     Hepatitis C antibody positive in blood 11/16/2017    History of blood transfusion     MI (myocardial infarction) [facility])      Past Surgical History:   Procedure Laterality Date    CARDIAC CATHETERIZATION      heart cath    COLONOSCOPY  11/17/2017    Initial Colonoscopy; Multiple Colon Polyps    ENDOSCOPY, COLON, DIAGNOSTIC      EYE SURGERY      cataract removal    [place] NJX DX/THER SBST INTRLMNR CRV/THRC W/IMG GDN Right 8/13/2018    RIGHT CERVICAL SEVEN THORACIC ONE EPIDURAL STEROID INJECTION SITE CONFIRMED BY FLUOROSCOPY performed by Tyronne Snellen, MD at [address]  01/18/2018    Gastritis, [place]     Family History   Problem Relation Age of Onset    Other Mother     Cancer Mother     High Blood Pressure Father      Social History     Socioeconomic History    Marital status:      Spouse name: None    Number of children: None    Years of education: None    Highest education level: None   Occupational History    None   Tobacco Use    Smoking status: Current Every Day Smoker     Packs/day: 1.00     Years: 50.00     Pack years: 50.00    Smokeless tobacco: Never Used    Tobacco comment: refused, is seeing primary physician. Substance and Sexual Activity    Alcohol use:  Yes     Alcohol/week: 1.0 standard drink     Types: 1 Cans of beer per week    Drug use: No    Sexual activity: None   Other Topics Concern    None   Social History Narrative    None     Social Determinants of Health     Financial Resource Strain:     Difficulty of Paying Living Expenses: Not on file   Food Insecurity:     Worried About Running Out of Food in the Last Year: Not on file    Ran Out of Food in the Last Year: Not on file   Transportation Needs:     Lack of Transportation (Medical): Not on file    Lack of Transportation (Non-Medical): Not on file   Physical Activity:     Days of Exercise per Week: Not on file    Minutes of Exercise per Session: Not on file   Stress:     Feeling of Stress : Not on file   Social Connections:     Frequency of Communication with Friends and Family: Not on file    Frequency of Social Gatherings with Friends and Family: Not on file    Attends [employer] Services: Not on file    Active Member of [address] [place] [address] Legions or Organizations: Not on file    Attends Club or Organization Meetings: Not on file    Marital Status: Not on file   Intimate Partner Violence:     Fear of Current or Ex-Partner: Not on file    Emotionally Abused: Not on file    Physically Abused: Not on file    Sexually Abused: Not on file   Housing Stability:     Unable to Pay for Housing in the Last Year: Not on file    Number of [place] in the Last Year: Not on file    Unstable Housing in the Last Year: Not on file     Current Outpatient Medications   Medication Sig Dispense Refill    pantoprazole (PROTONIX) 40 MG tablet TAKE 1 TABLET BY MOUTH IN THE MORNING BEFORE BREAKFAST 30 tablet 0    albuterol-ipratropium (COMBIVENT RESPIMAT)  MCG/ACT AERS inhaler Inhale 1 puff into the lungs every 6 hours as needed for Wheezing 1 each 2    ferrous sulfate (IRON 325) 325 (65 Fe) MG tablet Take 1 tablet by mouth three times a week (Patient not taking: Reported on 11/16/2021) 12 tablet 5     No current facility-administered medications for this visit. Allergies   Allergen Reactions    Codeine Hives     Review of Systems  A comprehensive review of systems was negative.      Planned anesthesia: General  Known anesthesia problems: no  Bleeding risk: no  Personal or FH of DVT/PE:  no  Patient objection to receiving blood products: no     Objective:      /80 (Site: Right Upper Arm, Position: Sitting, Cuff Size: Medium Adult)   Pulse 70   Resp 12   Ht 5' 8\" (1.727 m)   Wt 160 lb (72.6 kg)   BMI 24.33 kg/m²     General Appearance:  Alert, cooperative, no distress, appears stated age   Head:  Normocephalic, without obvious abnormality, atraumatic   Eyes:  PERRL, conjunctiva/corneas clear, EOM's intact   Ears:  deferred   Nose: Nares normal, septum midline, mucosa normal, no drainage or sinus tenderness   Throat: Lips, mucosa, and tongue normal; teeth and gums normal   Neck: Supple, symmetrical, trachea midline, no adenopathy, thyroid: not enlarged, symmetric, no tenderness/mass/nodules, no carotid bruit or JVD   Back:   deferred   Lungs:   Clear to auscultation bilaterally, respirations unlabored   Chest Wall:  No tenderness or deformity   Heart:  Regular rate and rhythm, S1, S2 normal, no murmur, rub or gallop   Abdomen:   Soft, non-tender, bowel sounds active all four quadrants,  no masses, no organomegaly   Genitalia:  deferred   Rectal:  deferred   Extremities: Extremities normal, atraumatic, no cyanosis or edema   Pulses: 2+ and symmetric   Skin: Skin color, texture, turgor normal, no rashes or lesions   Lymph nodes: Cervical, supraclavicular, and axillary nodes normal   Neurologic: Normal                Assessment:       Diagnosis Orders   1. Preop exam for internal medicine  EKG 12 Lead    Comprehensive Metabolic Panel    CBC with Auto Differential    Uric Acid   2. Pancytopenia ([address]     3. Alcoholic cirrhosis of liver without ascites ([address]     4. Thrombocytopenia ([address]           79 y.o. male with planned surgery as above. Plan:     Patient medically cleared for above planned procedure. Await labs to make sure platelet count it ok.

## 2022-10-10 ENCOUNTER — APPOINTMENT (OUTPATIENT)
Dept: CT IMAGING | Age: 67
DRG: 381 | End: 2022-10-10
Payer: MEDICARE

## 2022-10-10 ENCOUNTER — APPOINTMENT (OUTPATIENT)
Dept: GENERAL RADIOLOGY | Age: 67
DRG: 381 | End: 2022-10-10
Payer: MEDICARE

## 2022-10-10 ENCOUNTER — HOSPITAL ENCOUNTER (INPATIENT)
Age: 67
LOS: 3 days | Discharge: HOME HEALTH | DRG: 381 | End: 2022-10-13
Attending: EMERGENCY MEDICINE | Admitting: INTERNAL MEDICINE
Payer: MEDICARE

## 2022-10-10 DIAGNOSIS — I48.91 ATRIAL FIBRILLATION WITH RAPID VENTRICULAR RESPONSE (HCC): ICD-10-CM

## 2022-10-10 DIAGNOSIS — K92.2 GASTROINTESTINAL HEMORRHAGE, UNSPECIFIED GASTROINTESTINAL HEMORRHAGE TYPE: Primary | ICD-10-CM

## 2022-10-10 DIAGNOSIS — E83.42 HYPOMAGNESEMIA: ICD-10-CM

## 2022-10-10 PROBLEM — K92.0 COFFEE GROUND EMESIS: Status: ACTIVE | Noted: 2022-10-10

## 2022-10-10 LAB
A/G RATIO: 1 (ref 1.1–2.2)
ALBUMIN SERPL-MCNC: 3.7 G/DL (ref 3.4–5)
ALP BLD-CCNC: 102 U/L (ref 40–129)
ALT SERPL-CCNC: 28 U/L (ref 10–40)
AMMONIA: 52 UMOL/L (ref 16–60)
ANION GAP SERPL CALCULATED.3IONS-SCNC: 28 MMOL/L (ref 3–16)
AST SERPL-CCNC: 60 U/L (ref 15–37)
BASOPHILS ABSOLUTE: 0 K/UL (ref 0–0.2)
BASOPHILS RELATIVE PERCENT: 0.7 %
BILIRUB SERPL-MCNC: 6.6 MG/DL (ref 0–1)
BUN BLDV-MCNC: 36 MG/DL (ref 7–20)
CALCIUM SERPL-MCNC: 9 MG/DL (ref 8.3–10.6)
CHLORIDE BLD-SCNC: 95 MMOL/L (ref 99–110)
CO2: 19 MMOL/L (ref 21–32)
CREAT SERPL-MCNC: 1 MG/DL (ref 0.8–1.3)
EOSINOPHILS ABSOLUTE: 0 K/UL (ref 0–0.6)
EOSINOPHILS RELATIVE PERCENT: 0 %
ETHANOL: NORMAL MG/DL (ref 0–0.08)
GFR AFRICAN AMERICAN: >60
GFR NON-AFRICAN AMERICAN: >60
GLUCOSE BLD-MCNC: 143 MG/DL (ref 70–99)
HCT VFR BLD CALC: 25.1 % (ref 40.5–52.5)
HEMOGLOBIN: 7.8 G/DL (ref 13.5–17.5)
INFLUENZA A: NOT DETECTED
INFLUENZA B: NOT DETECTED
LACTIC ACID: 4.8 MMOL/L (ref 0.4–2)
LYMPHOCYTES ABSOLUTE: 0.3 K/UL (ref 1–5.1)
LYMPHOCYTES RELATIVE PERCENT: 3.9 %
MAGNESIUM: 1.3 MG/DL (ref 1.8–2.4)
MCH RBC QN AUTO: 26.1 PG (ref 26–34)
MCHC RBC AUTO-ENTMCNC: 31.1 G/DL (ref 31–36)
MCV RBC AUTO: 83.7 FL (ref 80–100)
MONOCYTES ABSOLUTE: 0.7 K/UL (ref 0–1.3)
MONOCYTES RELATIVE PERCENT: 10.2 %
NEUTROPHILS ABSOLUTE: 5.8 K/UL (ref 1.7–7.7)
NEUTROPHILS RELATIVE PERCENT: 85.2 %
OCCULT BLOOD DIAGNOSTIC: ABNORMAL
PDW BLD-RTO: 21.8 % (ref 12.4–15.4)
PLATELET # BLD: 89 K/UL (ref 135–450)
PLATELET SLIDE REVIEW: ABNORMAL
PMV BLD AUTO: 8.4 FL (ref 5–10.5)
POTASSIUM REFLEX MAGNESIUM: 3.5 MMOL/L (ref 3.5–5.1)
RBC # BLD: 3 M/UL (ref 4.2–5.9)
SARS-COV-2 RNA, RT PCR: NOT DETECTED
SODIUM BLD-SCNC: 142 MMOL/L (ref 136–145)
TOTAL PROTEIN: 7.4 G/DL (ref 6.4–8.2)
TROPONIN: <0.01 NG/ML
WBC # BLD: 6.8 K/UL (ref 4–11)

## 2022-10-10 PROCEDURE — 6360000002 HC RX W HCPCS: Performed by: EMERGENCY MEDICINE

## 2022-10-10 PROCEDURE — 36415 COLL VENOUS BLD VENIPUNCTURE: CPT

## 2022-10-10 PROCEDURE — 87636 SARSCOV2 & INF A&B AMP PRB: CPT

## 2022-10-10 PROCEDURE — 96366 THER/PROPH/DIAG IV INF ADDON: CPT

## 2022-10-10 PROCEDURE — 84484 ASSAY OF TROPONIN QUANT: CPT

## 2022-10-10 PROCEDURE — 80053 COMPREHEN METABOLIC PANEL: CPT

## 2022-10-10 PROCEDURE — 85025 COMPLETE CBC W/AUTO DIFF WBC: CPT

## 2022-10-10 PROCEDURE — 87040 BLOOD CULTURE FOR BACTERIA: CPT

## 2022-10-10 PROCEDURE — 96375 TX/PRO/DX INJ NEW DRUG ADDON: CPT

## 2022-10-10 PROCEDURE — 83605 ASSAY OF LACTIC ACID: CPT

## 2022-10-10 PROCEDURE — 71045 X-RAY EXAM CHEST 1 VIEW: CPT

## 2022-10-10 PROCEDURE — 90471 IMMUNIZATION ADMIN: CPT | Performed by: EMERGENCY MEDICINE

## 2022-10-10 PROCEDURE — 96361 HYDRATE IV INFUSION ADD-ON: CPT

## 2022-10-10 PROCEDURE — 2060000000 HC ICU INTERMEDIATE R&B

## 2022-10-10 PROCEDURE — 70450 CT HEAD/BRAIN W/O DYE: CPT

## 2022-10-10 PROCEDURE — 2580000003 HC RX 258: Performed by: EMERGENCY MEDICINE

## 2022-10-10 PROCEDURE — 83550 IRON BINDING TEST: CPT

## 2022-10-10 PROCEDURE — 90715 TDAP VACCINE 7 YRS/> IM: CPT | Performed by: EMERGENCY MEDICINE

## 2022-10-10 PROCEDURE — 82270 OCCULT BLOOD FECES: CPT

## 2022-10-10 PROCEDURE — 82140 ASSAY OF AMMONIA: CPT

## 2022-10-10 PROCEDURE — 2500000003 HC RX 250 WO HCPCS: Performed by: EMERGENCY MEDICINE

## 2022-10-10 PROCEDURE — 99285 EMERGENCY DEPT VISIT HI MDM: CPT

## 2022-10-10 PROCEDURE — 83540 ASSAY OF IRON: CPT

## 2022-10-10 PROCEDURE — 93005 ELECTROCARDIOGRAM TRACING: CPT | Performed by: EMERGENCY MEDICINE

## 2022-10-10 PROCEDURE — 83735 ASSAY OF MAGNESIUM: CPT

## 2022-10-10 PROCEDURE — 96365 THER/PROPH/DIAG IV INF INIT: CPT

## 2022-10-10 PROCEDURE — 82077 ASSAY SPEC XCP UR&BREATH IA: CPT

## 2022-10-10 PROCEDURE — 73090 X-RAY EXAM OF FOREARM: CPT

## 2022-10-10 RX ORDER — METOPROLOL TARTRATE 5 MG/5ML
10 INJECTION INTRAVENOUS ONCE
Status: COMPLETED | OUTPATIENT
Start: 2022-10-10 | End: 2022-10-10

## 2022-10-10 RX ORDER — ONDANSETRON 2 MG/ML
4 INJECTION INTRAMUSCULAR; INTRAVENOUS ONCE
Status: COMPLETED | OUTPATIENT
Start: 2022-10-10 | End: 2022-10-10

## 2022-10-10 RX ORDER — 0.9 % SODIUM CHLORIDE 0.9 %
30 INTRAVENOUS SOLUTION INTRAVENOUS ONCE
Status: COMPLETED | OUTPATIENT
Start: 2022-10-10 | End: 2022-10-10

## 2022-10-10 RX ORDER — MAGNESIUM SULFATE IN WATER 40 MG/ML
2000 INJECTION, SOLUTION INTRAVENOUS ONCE
Status: COMPLETED | OUTPATIENT
Start: 2022-10-10 | End: 2022-10-11

## 2022-10-10 RX ORDER — OXYCODONE HYDROCHLORIDE AND ACETAMINOPHEN 5; 325 MG/1; MG/1
1 TABLET ORAL EVERY 12 HOURS PRN
COMMUNITY
Start: 2022-08-31

## 2022-10-10 RX ORDER — MAGNESIUM SULFATE IN WATER 40 MG/ML
2000 INJECTION, SOLUTION INTRAVENOUS ONCE
Status: COMPLETED | OUTPATIENT
Start: 2022-10-10 | End: 2022-10-10

## 2022-10-10 RX ADMIN — METOPROLOL TARTRATE 10 MG: 5 INJECTION INTRAVENOUS at 16:43

## 2022-10-10 RX ADMIN — THIAMINE HYDROCHLORIDE: 100 INJECTION, SOLUTION INTRAMUSCULAR; INTRAVENOUS at 23:35

## 2022-10-10 RX ADMIN — DILTIAZEM HYDROCHLORIDE 5 MG/HR: 5 INJECTION, SOLUTION INTRAVENOUS at 18:10

## 2022-10-10 RX ADMIN — TETANUS TOXOID, REDUCED DIPHTHERIA TOXOID AND ACELLULAR PERTUSSIS VACCINE, ADSORBED 0.5 ML: 5; 2.5; 8; 8; 2.5 SUSPENSION INTRAMUSCULAR at 16:45

## 2022-10-10 RX ADMIN — SODIUM CHLORIDE 1974 ML: 9 INJECTION, SOLUTION INTRAVENOUS at 16:42

## 2022-10-10 RX ADMIN — MAGNESIUM SULFATE HEPTAHYDRATE 2000 MG: 40 INJECTION, SOLUTION INTRAVENOUS at 21:39

## 2022-10-10 RX ADMIN — ONDANSETRON HYDROCHLORIDE 4 MG: 2 INJECTION, SOLUTION INTRAMUSCULAR; INTRAVENOUS at 17:24

## 2022-10-10 NOTE — ED PROVIDER NOTES
[address] ED  eMERGENCY dEPARTMENT eNCOUnter      Pt Name: Surinder Arias  MRN: [id number]  [place] 1955  Date of evaluation: 10/10/2022  Provider: Cali Hartman MD    CHIEF COMPLAINT       Chief Complaint   Patient presents with    Emesis     Coffee ground emesis and black stool. Has had a couple of falls due to dizziness. Hx of cirrhosis         HISTORY OF PRESENT ILLNESS   (Location/Symptom, Timing/Onset, Context/Setting, Quality, Duration, Modifying Factors, Severity)  Note limiting factors. Surinder Arias is a 79 y.o. male with previous history of alcohol abuse, hepatitis C, and COPD who presents for 1 day of upper abdominal pain, coffee-ground emesis, lightheadedness, and falls. The patient began to vomit yesterday evening and his wife reports that it worsened throughout the night. The patient reports that he went to the bathroom to vomit and got very lightheaded and fell. He reports he does not know what hit the ground when he fell but his right forearm hurts. He denies any pain to his head, neck, chest, or hips. He has been ambulatory since the fall. Reports he is at risk palliative dark vomit. He reports his symptoms are severe, constant, and unchanged. He denies any known aggravating or alleviating factors. HPI    Nursing Notes were reviewed. REVIEW OFSYSTEMS    (2-9 systems for level 4, 10 or more for level 5)     Review of Systems    Except as noted above the remainder of the review of systems was reviewed and negative.        PAST MEDICAL HISTORY     Past Medical History:   Diagnosis Date    COPD (chronic obstructive pulmonary disease) ([facility] [address]     Hepatitis C antibody positive in blood 11/16/2017    History of blood transfusion     MI (myocardial infarction) [facility])          SURGICAL HISTORY       Past Surgical History:   Procedure Laterality Date    CARDIAC CATHETERIZATION      heart cath    COLONOSCOPY  11/17/2017    Initial Colonoscopy; Multiple Colon Polyps ENDOSCOPY, COLON, DIAGNOSTIC      EYE SURGERY      cataract removal    WA NJX DX/THER SBST INTRLMNR CRV/THRC W/IMG GDN Right 8/13/2018    RIGHT CERVICAL SEVEN THORACIC ONE EPIDURAL STEROID INJECTION SITE CONFIRMED BY FLUOROSCOPY performed by Samira Martinez MD at [facility] [address]  01/18/2018    Gastritis, [place]         CURRENT MEDICATIONS       Previous Medications    ALBUTEROL-IPRATROPIUM (COMBIVENT RESPIMAT)  MCG/ACT AERS INHALER    Inhale 1 puff into the lungs every 6 hours as needed for Wheezing    FERROUS SULFATE (IRON 325) 325 (65 FE) MG TABLET    Take 1 tablet by mouth three times a week    PANTOPRAZOLE (PROTONIX) 40 MG TABLET    TAKE 1 TABLET BY MOUTH IN THE MORNING BEFORE BREAKFAST       ALLERGIES     Codeine    FAMILY HISTORY       Family History   Problem Relation Age of Onset    Other Mother     Cancer Mother     High Blood Pressure Father           SOCIAL HISTORY       Social History     Socioeconomic History    Marital status:      Spouse name: None    Number of children: None    Years of education: None    Highest education level: None   Tobacco Use    Smoking status: Every Day     Packs/day: 1.00     Years: 50.00     Pack years: 50.00     Types: Cigarettes    Smokeless tobacco: Never    Tobacco comments:     refused, is seeing primary physician. Substance and Sexual Activity    Alcohol use: Not Currently    Drug use: No         PHYSICAL EXAM    (up to 7 for level 4, 8 or more for level 5)     ED Triage Vitals   BP Temp Temp Source Heart Rate Resp SpO2 Height Weight   10/10/22 1557 10/10/22 1557 10/10/22 1557 10/10/22 1557 10/10/22 1557 10/10/22 1557 10/10/22 1557 10/10/22 1603   111/71 98.1 °F (36.7 °C) Oral (!) 150 18 96 % 5' 8\" (1.727 m) 145 lb (65.8 kg)       Physical Exam  Vitals and nursing note reviewed. Constitutional:       Appearance: He is well-developed.       Comments: Chronically ill-appearing cachectic male laying in hospital bed   HENT:      Head: Normocephalic and atraumatic. Right Ear: External ear normal.      Left Ear: External ear normal.   Eyes:      Conjunctiva/sclera: Conjunctivae normal.   Neck:      Vascular: No JVD. Trachea: No tracheal deviation. Cardiovascular:      Rate and Rhythm: Regular rhythm. Tachycardia present. Pulmonary:      Effort: Pulmonary effort is normal. No respiratory distress. Breath sounds: Normal breath sounds. No wheezing. Abdominal:      General: There is no distension. Palpations: Abdomen is soft. Tenderness: There is no abdominal tenderness. There is no guarding or rebound. Musculoskeletal:         General: Tenderness present. Normal range of motion. Cervical back: Neck supple. Comments: Tenderness and abrasion to right forearm. No palpable deformity. No midline spine tenderness. Skin:     General: Skin is warm and dry. Neurological:      General: No focal deficit present. Mental Status: He is oriented to person, place, and time. Cranial Nerves: No cranial nerve deficit. Comments: Patient awake and oriented to person place time and situation. 4-5 strength equal in all 4 extremities. Sensation intact in all 4 extremities. Symmetric smile. DIAGNOSTIC RESULTS     EKG:All EKG's are interpreted by the Emergency Department Physician who either signs or Co-signs this chart in the absence of a cardiologist.    The Ekg interpreted by me shows  Supraventricular tachycardia, rate of 174  Axis is   Left axis deviation  QTc is   483ms  Intervals and Durations are unremarkable. ST Segments: nonspecific changes  Nonspecific changes including significant increase in rate from previous EKG on 4/17/2021        The REPEAT Ekg interpreted by me shows  Regular tachycardia, rate of 125  Axis is   Left axis deviation  QTc is   505ms  Intervals and Durations are unremarkable.       ST Segments: nonspecific changes  Reduction in rate, new supraventricular complexes, lengthening of QTC from EKG earlier today      RADIOLOGY:     Interpretation per the Radiologist below, if available at the time of this note:    CT HEAD WO CONTRAST   Final Result      1. No acute ischemic change or other acute intracranial abnormality is   noted. No evidence of intracranial hemorrhage. 2.  Prominence of the sulci and/or CSF spaces suggests a degree of cerebral   atrophy. 3.  Right more so than left frontal and minimal bilateral maxillary chronic   sinusitis. XR CHEST PORTABLE   Final Result      1. Right AC joint is widened and an AC joint injury/separation is not   excluded though this may be nonacute, correlate clinically. Otherwise no   evidence of fracture. 2.  No acute cardiopulmonary disease noted. XR RADIUS ULNA RIGHT (2 VIEWS)   Final Result      Radiolucency and some soft tissue irregularity involving the anteromedial   forearm which may just be due to a bandage, though a soft tissue injury is   not excluded. No evidence of acute fracture.                ED BEDSIDE ULTRASOUND:   Performed by ED Physician - none    LABS:  Labs Reviewed   CBC WITH AUTO DIFFERENTIAL - Abnormal; Notable for the following components:       Result Value    RBC 3.00 (*)     Hemoglobin 7.8 (*)     Hematocrit 25.1 (*)     RDW 21.8 (*)     Platelets 89 (*)     Lymphocytes Absolute 0.3 (*)     All other components within normal limits   COMPREHENSIVE METABOLIC PANEL W/ REFLEX TO MG FOR LOW K - Abnormal; Notable for the following components:    Chloride 95 (*)     CO2 19 (*)     Anion Gap 28 (*)     Glucose 143 (*)     BUN 36 (*)     Albumin/Globulin Ratio 1.0 (*)     Total Bilirubin 6.6 (*)     AST 60 (*)     All other components within normal limits   MAGNESIUM - Abnormal; Notable for the following components:    Magnesium 1.30 (*)     All other components within normal limits   CULTURE, BLOOD 2   CULTURE, BLOOD 1   AMMONIA   TROPONIN   ETHANOL URINALYSIS WITH REFLEX TO CULTURE   LACTIC ACID       All otherlabs were within normal range or not returned as of this dictation. EMERGENCY DEPARTMENT COURSE and DIFFERENTIAL DIAGNOSIS/MDM:   Vitals:    Vitals:    10/10/22 1932 10/10/22 1942 10/10/22 1947 10/10/22 1949   BP: 100/63 96/63 (!) 101/56    Pulse: (!) 118 (!) 106 (!) 126 (!) 109   Resp: 19 19 17 15   Temp:       TempSrc:       SpO2: 99% 97% 92% 97%   Weight:       Height:           MDM  Patient is tachycardic on arrival and while this is read as sinus tachycardia I favor A. fib or SVT. Patient is given metoprolol with slowing of rate which continues to be more irregular. At this point rate appears to be atrial fibrillation with rapid ventricular response. Patient started on a diltiazem drip with improvement in rate control. Work-up is concerning for GI bleed with decreased hemoglobin and elevated BUN consistent with upper GI bleed. Patient was given Protonix. Patient also has low magnesium which was replaced in the emergency department. GI consulted and patient mated to hospital service for further management and care. Patient expresses understanding and agreement with plan.      Critical Care  Performed by: Juhi Hills MD  Authorized by: Juhi Hills MD     Critical care provider statement:     Critical care time (minutes):  45    Critical care time was exclusive of:  Separately billable procedures and treating other patients and teaching time    Critical care was necessary to treat or prevent imminent or life-threatening deterioration of the following conditions:  Circulatory failure, cardiac failure and shock    Critical care was time spent personally by me on the following activities:  Ordering and performing treatments and interventions, development of treatment plan with patient or surrogate, ordering and review of laboratory studies, discussions with consultants, ordering and review of radiographic studies, evaluation of patient's response to treatment, re-evaluation of patient's condition, examination of patient, review of old charts and obtaining history from patient or surrogate    FINAL IMPRESSION      1. Gastrointestinal hemorrhage, unspecified gastrointestinal hemorrhage type    2. Hypomagnesemia    3. Atrial fibrillation with rapid ventricular response [facility])          DISPOSITION/PLAN   DISPOSITION Decision To Admit 10/10/2022 08:59:17 PM      (Please note that portions of this note were completed with a voice recognition program.  Efforts were made to edit the dictations but occasionally words aremis-transcribed. )    Mina Finley MD (electronically signed)  Attending Emergency Physician           Mina Finley MD  10/10/22 [id number]

## 2022-10-11 ENCOUNTER — ANESTHESIA (OUTPATIENT)
Dept: ENDOSCOPY | Age: 67
DRG: 381 | End: 2022-10-11
Payer: MEDICARE

## 2022-10-11 ENCOUNTER — ANESTHESIA EVENT (OUTPATIENT)
Dept: ENDOSCOPY | Age: 67
DRG: 381 | End: 2022-10-11
Payer: MEDICARE

## 2022-10-11 PROBLEM — I47.19 MULTIFOCAL ATRIAL TACHYCARDIA: Status: ACTIVE | Noted: 2022-10-11

## 2022-10-11 PROBLEM — K92.2 GASTROINTESTINAL HEMORRHAGE: Status: ACTIVE | Noted: 2022-10-11

## 2022-10-11 PROBLEM — D62 ABLA (ACUTE BLOOD LOSS ANEMIA): Status: ACTIVE | Noted: 2022-10-11

## 2022-10-11 PROBLEM — I47.1 MULTIFOCAL ATRIAL TACHYCARDIA (HCC): Status: ACTIVE | Noted: 2022-10-11

## 2022-10-11 PROBLEM — K74.60 CIRRHOSIS OF LIVER WITHOUT ASCITES (HCC): Status: ACTIVE | Noted: 2022-10-11

## 2022-10-11 LAB
A/G RATIO: 1 (ref 1.1–2.2)
ABO/RH: NORMAL
ALBUMIN SERPL-MCNC: 3.1 G/DL (ref 3.4–5)
ALP BLD-CCNC: 83 U/L (ref 40–129)
ALT SERPL-CCNC: 22 U/L (ref 10–40)
ANION GAP SERPL CALCULATED.3IONS-SCNC: 10 MMOL/L (ref 3–16)
ANTIBODY SCREEN: NORMAL
APTT: 40.2 SEC (ref 23–34.3)
AST SERPL-CCNC: 59 U/L (ref 15–37)
BASOPHILS ABSOLUTE: 0 K/UL (ref 0–0.2)
BASOPHILS RELATIVE PERCENT: 0.4 %
BILIRUB SERPL-MCNC: 6.2 MG/DL (ref 0–1)
BLOOD BANK DISPENSE STATUS: NORMAL
BLOOD BANK DISPENSE STATUS: NORMAL
BLOOD BANK PRODUCT CODE: NORMAL
BLOOD BANK PRODUCT CODE: NORMAL
BPU ID: NORMAL
BPU ID: NORMAL
BUN BLDV-MCNC: 35 MG/DL (ref 7–20)
CALCIUM SERPL-MCNC: 8 MG/DL (ref 8.3–10.6)
CHLORIDE BLD-SCNC: 102 MMOL/L (ref 99–110)
CO2: 29 MMOL/L (ref 21–32)
CREAT SERPL-MCNC: 0.7 MG/DL (ref 0.8–1.3)
DESCRIPTION BLOOD BANK: NORMAL
DESCRIPTION BLOOD BANK: NORMAL
EKG ATRIAL RATE: 111 BPM
EKG ATRIAL RATE: 125 BPM
EKG ATRIAL RATE: 174 BPM
EKG DIAGNOSIS: NORMAL
EKG P AXIS: 69 DEGREES
EKG P-R INTERVAL: 144 MS
EKG P-R INTERVAL: 152 MS
EKG P-R INTERVAL: 152 MS
EKG Q-T INTERVAL: 284 MS
EKG Q-T INTERVAL: 350 MS
EKG Q-T INTERVAL: 424 MS
EKG QRS DURATION: 90 MS
EKG QRS DURATION: 96 MS
EKG QRS DURATION: 98 MS
EKG QTC CALCULATION (BAZETT): 483 MS
EKG QTC CALCULATION (BAZETT): 505 MS
EKG QTC CALCULATION (BAZETT): 552 MS
EKG R AXIS: -39 DEGREES
EKG R AXIS: -41 DEGREES
EKG R AXIS: -50 DEGREES
EKG T AXIS: 101 DEGREES
EKG T AXIS: 107 DEGREES
EKG T AXIS: 80 DEGREES
EKG VENTRICULAR RATE: 102 BPM
EKG VENTRICULAR RATE: 125 BPM
EKG VENTRICULAR RATE: 174 BPM
EOSINOPHILS ABSOLUTE: 0 K/UL (ref 0–0.6)
EOSINOPHILS RELATIVE PERCENT: 0.4 %
GFR AFRICAN AMERICAN: >60
GFR NON-AFRICAN AMERICAN: >60
GLUCOSE BLD-MCNC: 102 MG/DL (ref 70–99)
HCT VFR BLD CALC: 18.6 % (ref 40.5–52.5)
HCT VFR BLD CALC: 19.3 % (ref 40.5–52.5)
HCT VFR BLD CALC: 24.6 % (ref 40.5–52.5)
HCT VFR BLD CALC: 25.5 % (ref 40.5–52.5)
HEMOGLOBIN: 5.8 G/DL (ref 13.5–17.5)
HEMOGLOBIN: 6 G/DL (ref 13.5–17.5)
HEMOGLOBIN: 7.7 G/DL (ref 13.5–17.5)
HEMOGLOBIN: 8.1 G/DL (ref 13.5–17.5)
INR BLD: 2.26 (ref 0.87–1.14)
IRON SATURATION: ABNORMAL % (ref 20–50)
IRON: 406 UG/DL (ref 59–158)
LACTIC ACID: 1.7 MMOL/L (ref 0.4–2)
LACTIC ACID: 1.8 MMOL/L (ref 0.4–2)
LYMPHOCYTES ABSOLUTE: 0.9 K/UL (ref 1–5.1)
LYMPHOCYTES RELATIVE PERCENT: 17.4 %
MAGNESIUM: 2.3 MG/DL (ref 1.8–2.4)
MCH RBC QN AUTO: 26 PG (ref 26–34)
MCHC RBC AUTO-ENTMCNC: 30.9 G/DL (ref 31–36)
MCV RBC AUTO: 84 FL (ref 80–100)
MONOCYTES ABSOLUTE: 0.5 K/UL (ref 0–1.3)
MONOCYTES RELATIVE PERCENT: 9.2 %
NEUTROPHILS ABSOLUTE: 3.6 K/UL (ref 1.7–7.7)
NEUTROPHILS RELATIVE PERCENT: 72.6 %
PDW BLD-RTO: 22.1 % (ref 12.4–15.4)
PLATELET # BLD: 55 K/UL (ref 135–450)
PMV BLD AUTO: 8.5 FL (ref 5–10.5)
POTASSIUM REFLEX MAGNESIUM: 3 MMOL/L (ref 3.5–5.1)
PROTHROMBIN TIME: 24.8 SEC (ref 11.7–14.5)
RBC # BLD: 2.3 M/UL (ref 4.2–5.9)
SODIUM BLD-SCNC: 141 MMOL/L (ref 136–145)
TOTAL IRON BINDING CAPACITY: ABNORMAL UG/DL (ref 260–445)
TOTAL PROTEIN: 6.3 G/DL (ref 6.4–8.2)
TROPONIN: 0.01 NG/ML
TROPONIN: <0.01 NG/ML
WBC # BLD: 5 K/UL (ref 4–11)

## 2022-10-11 PROCEDURE — 86850 RBC ANTIBODY SCREEN: CPT

## 2022-10-11 PROCEDURE — 3700000001 HC ADD 15 MINUTES (ANESTHESIA): Performed by: INTERNAL MEDICINE

## 2022-10-11 PROCEDURE — 6360000002 HC RX W HCPCS: Performed by: INTERNAL MEDICINE

## 2022-10-11 PROCEDURE — 36430 TRANSFUSION BLD/BLD COMPNT: CPT

## 2022-10-11 PROCEDURE — 99233 SBSQ HOSP IP/OBS HIGH 50: CPT | Performed by: INTERNAL MEDICINE

## 2022-10-11 PROCEDURE — 85730 THROMBOPLASTIN TIME PARTIAL: CPT

## 2022-10-11 PROCEDURE — 93010 ELECTROCARDIOGRAM REPORT: CPT | Performed by: INTERNAL MEDICINE

## 2022-10-11 PROCEDURE — 94761 N-INVAS EAR/PLS OXIMETRY MLT: CPT

## 2022-10-11 PROCEDURE — 85610 PROTHROMBIN TIME: CPT

## 2022-10-11 PROCEDURE — 86923 COMPATIBILITY TEST ELECTRIC: CPT

## 2022-10-11 PROCEDURE — 86901 BLOOD TYPING SEROLOGIC RH(D): CPT

## 2022-10-11 PROCEDURE — 2580000003 HC RX 258: Performed by: INTERNAL MEDICINE

## 2022-10-11 PROCEDURE — 84484 ASSAY OF TROPONIN QUANT: CPT

## 2022-10-11 PROCEDURE — 85025 COMPLETE CBC W/AUTO DIFF WBC: CPT

## 2022-10-11 PROCEDURE — 80053 COMPREHEN METABOLIC PANEL: CPT

## 2022-10-11 PROCEDURE — 99223 1ST HOSP IP/OBS HIGH 75: CPT | Performed by: INTERNAL MEDICINE

## 2022-10-11 PROCEDURE — 3700000000 HC ANESTHESIA ATTENDED CARE: Performed by: INTERNAL MEDICINE

## 2022-10-11 PROCEDURE — P9016 RBC LEUKOCYTES REDUCED: HCPCS

## 2022-10-11 PROCEDURE — 83605 ASSAY OF LACTIC ACID: CPT

## 2022-10-11 PROCEDURE — 0W3P8ZZ CONTROL BLEEDING IN GASTROINTESTINAL TRACT, VIA NATURAL OR ARTIFICIAL OPENING ENDOSCOPIC: ICD-10-PCS | Performed by: INTERNAL MEDICINE

## 2022-10-11 PROCEDURE — 85018 HEMOGLOBIN: CPT

## 2022-10-11 PROCEDURE — C9113 INJ PANTOPRAZOLE SODIUM, VIA: HCPCS | Performed by: INTERNAL MEDICINE

## 2022-10-11 PROCEDURE — 2709999900 HC NON-CHARGEABLE SUPPLY: Performed by: INTERNAL MEDICINE

## 2022-10-11 PROCEDURE — 2500000003 HC RX 250 WO HCPCS: Performed by: INTERNAL MEDICINE

## 2022-10-11 PROCEDURE — 6370000000 HC RX 637 (ALT 250 FOR IP): Performed by: INTERNAL MEDICINE

## 2022-10-11 PROCEDURE — 83735 ASSAY OF MAGNESIUM: CPT

## 2022-10-11 PROCEDURE — 3609013000 HC EGD TRANSORAL CONTROL BLEEDING ANY METHOD: Performed by: INTERNAL MEDICINE

## 2022-10-11 PROCEDURE — 2580000003 HC RX 258: Performed by: NURSE ANESTHETIST, CERTIFIED REGISTERED

## 2022-10-11 PROCEDURE — 2700000000 HC OXYGEN THERAPY PER DAY

## 2022-10-11 PROCEDURE — 6360000002 HC RX W HCPCS: Performed by: NURSE ANESTHETIST, CERTIFIED REGISTERED

## 2022-10-11 PROCEDURE — 93005 ELECTROCARDIOGRAM TRACING: CPT | Performed by: INTERNAL MEDICINE

## 2022-10-11 PROCEDURE — 7100000010 HC PHASE II RECOVERY - FIRST 15 MIN: Performed by: INTERNAL MEDICINE

## 2022-10-11 PROCEDURE — 7100000011 HC PHASE II RECOVERY - ADDTL 15 MIN: Performed by: INTERNAL MEDICINE

## 2022-10-11 PROCEDURE — 2580000003 HC RX 258: Performed by: ANESTHESIOLOGY

## 2022-10-11 PROCEDURE — 2060000000 HC ICU INTERMEDIATE R&B

## 2022-10-11 PROCEDURE — 2720000010 HC SURG SUPPLY STERILE: Performed by: INTERNAL MEDICINE

## 2022-10-11 PROCEDURE — 36415 COLL VENOUS BLD VENIPUNCTURE: CPT

## 2022-10-11 PROCEDURE — 2500000003 HC RX 250 WO HCPCS: Performed by: ANESTHESIOLOGY

## 2022-10-11 PROCEDURE — 2500000003 HC RX 250 WO HCPCS: Performed by: NURSE ANESTHETIST, CERTIFIED REGISTERED

## 2022-10-11 PROCEDURE — A4216 STERILE WATER/SALINE, 10 ML: HCPCS | Performed by: INTERNAL MEDICINE

## 2022-10-11 PROCEDURE — 86900 BLOOD TYPING SEROLOGIC ABO: CPT

## 2022-10-11 PROCEDURE — 85014 HEMATOCRIT: CPT

## 2022-10-11 RX ORDER — SODIUM CHLORIDE 0.9 % (FLUSH) 0.9 %
5-40 SYRINGE (ML) INJECTION EVERY 12 HOURS SCHEDULED
Status: DISCONTINUED | OUTPATIENT
Start: 2022-10-11 | End: 2022-10-13 | Stop reason: HOSPADM

## 2022-10-11 RX ORDER — ONDANSETRON 2 MG/ML
4 INJECTION INTRAMUSCULAR; INTRAVENOUS EVERY 6 HOURS PRN
Status: DISCONTINUED | OUTPATIENT
Start: 2022-10-11 | End: 2022-10-13 | Stop reason: HOSPADM

## 2022-10-11 RX ORDER — SODIUM CHLORIDE 0.9 % (FLUSH) 0.9 %
5-40 SYRINGE (ML) INJECTION PRN
Status: DISCONTINUED | OUTPATIENT
Start: 2022-10-11 | End: 2022-10-13 | Stop reason: HOSPADM

## 2022-10-11 RX ORDER — SODIUM CHLORIDE 9 MG/ML
INJECTION, SOLUTION INTRAVENOUS PRN
Status: DISCONTINUED | OUTPATIENT
Start: 2022-10-11 | End: 2022-10-13 | Stop reason: HOSPADM

## 2022-10-11 RX ORDER — FOLIC ACID 1 MG/1
1 TABLET ORAL DAILY
Status: DISCONTINUED | OUTPATIENT
Start: 2022-10-11 | End: 2022-10-13 | Stop reason: HOSPADM

## 2022-10-11 RX ORDER — PHENYLEPHRINE HCL IN 0.9% NACL 1 MG/10 ML
SYRINGE (ML) INTRAVENOUS PRN
Status: DISCONTINUED | OUTPATIENT
Start: 2022-10-11 | End: 2022-10-11 | Stop reason: SDUPTHER

## 2022-10-11 RX ORDER — LIDOCAINE HYDROCHLORIDE 20 MG/ML
INJECTION, SOLUTION INFILTRATION; PERINEURAL PRN
Status: DISCONTINUED | OUTPATIENT
Start: 2022-10-11 | End: 2022-10-11 | Stop reason: SDUPTHER

## 2022-10-11 RX ORDER — SODIUM CHLORIDE 0.9 % (FLUSH) 0.9 %
5-40 SYRINGE (ML) INJECTION EVERY 12 HOURS SCHEDULED
Status: CANCELLED | OUTPATIENT
Start: 2022-10-11

## 2022-10-11 RX ORDER — FAMOTIDINE 10 MG/ML
20 INJECTION, SOLUTION INTRAVENOUS ONCE
Status: COMPLETED | OUTPATIENT
Start: 2022-10-11 | End: 2022-10-11

## 2022-10-11 RX ORDER — MAGNESIUM SULFATE 1 G/100ML
1000 INJECTION INTRAVENOUS PRN
Status: DISCONTINUED | OUTPATIENT
Start: 2022-10-11 | End: 2022-10-13 | Stop reason: HOSPADM

## 2022-10-11 RX ORDER — POTASSIUM CHLORIDE 20 MEQ/1
40 TABLET, EXTENDED RELEASE ORAL ONCE
Status: COMPLETED | OUTPATIENT
Start: 2022-10-11 | End: 2022-10-11

## 2022-10-11 RX ORDER — OXYCODONE HYDROCHLORIDE 5 MG/1
5 TABLET ORAL PRN
Status: CANCELLED | OUTPATIENT
Start: 2022-10-11 | End: 2022-10-11

## 2022-10-11 RX ORDER — OXYCODONE HYDROCHLORIDE 5 MG/1
5 TABLET ORAL EVERY 12 HOURS PRN
Status: DISCONTINUED | OUTPATIENT
Start: 2022-10-11 | End: 2022-10-13 | Stop reason: HOSPADM

## 2022-10-11 RX ORDER — OXYCODONE HYDROCHLORIDE 5 MG/1
10 TABLET ORAL PRN
Status: CANCELLED | OUTPATIENT
Start: 2022-10-11 | End: 2022-10-11

## 2022-10-11 RX ORDER — PROPOFOL 10 MG/ML
INJECTION, EMULSION INTRAVENOUS PRN
Status: DISCONTINUED | OUTPATIENT
Start: 2022-10-11 | End: 2022-10-11 | Stop reason: SDUPTHER

## 2022-10-11 RX ORDER — DILTIAZEM HYDROCHLORIDE 60 MG/1
30 TABLET, FILM COATED ORAL EVERY 6 HOURS SCHEDULED
Status: DISCONTINUED | OUTPATIENT
Start: 2022-10-11 | End: 2022-10-13 | Stop reason: HOSPADM

## 2022-10-11 RX ORDER — POTASSIUM CHLORIDE 7.45 MG/ML
10 INJECTION INTRAVENOUS PRN
Status: DISCONTINUED | OUTPATIENT
Start: 2022-10-11 | End: 2022-10-13 | Stop reason: HOSPADM

## 2022-10-11 RX ORDER — SODIUM CHLORIDE 9 MG/ML
INJECTION, SOLUTION INTRAVENOUS CONTINUOUS PRN
Status: DISCONTINUED | OUTPATIENT
Start: 2022-10-11 | End: 2022-10-11 | Stop reason: SDUPTHER

## 2022-10-11 RX ORDER — SODIUM CHLORIDE 9 MG/ML
25 INJECTION, SOLUTION INTRAVENOUS PRN
Status: CANCELLED | OUTPATIENT
Start: 2022-10-11

## 2022-10-11 RX ORDER — SODIUM CHLORIDE, SODIUM LACTATE, POTASSIUM CHLORIDE, CALCIUM CHLORIDE 600; 310; 30; 20 MG/100ML; MG/100ML; MG/100ML; MG/100ML
INJECTION, SOLUTION INTRAVENOUS CONTINUOUS
Status: DISCONTINUED | OUTPATIENT
Start: 2022-10-11 | End: 2022-10-11

## 2022-10-11 RX ORDER — SODIUM CHLORIDE 9 MG/ML
INJECTION, SOLUTION INTRAVENOUS CONTINUOUS
Status: DISCONTINUED | OUTPATIENT
Start: 2022-10-11 | End: 2022-10-12

## 2022-10-11 RX ORDER — SODIUM CHLORIDE 0.9 % (FLUSH) 0.9 %
5-40 SYRINGE (ML) INJECTION PRN
Status: CANCELLED | OUTPATIENT
Start: 2022-10-11

## 2022-10-11 RX ORDER — M-VIT,TX,IRON,MINS/CALC/FOLIC 27MG-0.4MG
1 TABLET ORAL DAILY
Status: DISCONTINUED | OUTPATIENT
Start: 2022-10-11 | End: 2022-10-13 | Stop reason: HOSPADM

## 2022-10-11 RX ORDER — MEPERIDINE HYDROCHLORIDE 25 MG/ML
12.5 INJECTION INTRAMUSCULAR; INTRAVENOUS; SUBCUTANEOUS EVERY 5 MIN PRN
Status: CANCELLED | OUTPATIENT
Start: 2022-10-11

## 2022-10-11 RX ORDER — ONDANSETRON 2 MG/ML
4 INJECTION INTRAMUSCULAR; INTRAVENOUS
Status: CANCELLED | OUTPATIENT
Start: 2022-10-11 | End: 2022-10-12

## 2022-10-11 RX ORDER — LANOLIN ALCOHOL/MO/W.PET/CERES
100 CREAM (GRAM) TOPICAL DAILY
Status: DISCONTINUED | OUTPATIENT
Start: 2022-10-11 | End: 2022-10-13 | Stop reason: HOSPADM

## 2022-10-11 RX ORDER — NICOTINE 21 MG/24HR
1 PATCH, TRANSDERMAL 24 HOURS TRANSDERMAL DAILY
Status: DISCONTINUED | OUTPATIENT
Start: 2022-10-11 | End: 2022-10-13 | Stop reason: HOSPADM

## 2022-10-11 RX ORDER — ONDANSETRON 4 MG/1
4 TABLET, ORALLY DISINTEGRATING ORAL EVERY 8 HOURS PRN
Status: DISCONTINUED | OUTPATIENT
Start: 2022-10-11 | End: 2022-10-13 | Stop reason: HOSPADM

## 2022-10-11 RX ADMIN — OCTREOTIDE ACETATE 25 MCG/HR: 500 INJECTION, SOLUTION INTRAVENOUS; SUBCUTANEOUS at 03:55

## 2022-10-11 RX ADMIN — FAMOTIDINE 20 MG: 10 INJECTION INTRAVENOUS at 16:11

## 2022-10-11 RX ADMIN — OXYCODONE 5 MG: 5 TABLET ORAL at 03:27

## 2022-10-11 RX ADMIN — Medication 120 MCG: at 14:48

## 2022-10-11 RX ADMIN — MAGNESIUM SULFATE HEPTAHYDRATE 2000 MG: 40 INJECTION, SOLUTION INTRAVENOUS at 00:09

## 2022-10-11 RX ADMIN — LIDOCAINE HYDROCHLORIDE 60 MG: 20 INJECTION, SOLUTION INFILTRATION; PERINEURAL at 14:39

## 2022-10-11 RX ADMIN — PROPOFOL 50 MG: 10 INJECTION, EMULSION INTRAVENOUS at 14:42

## 2022-10-11 RX ADMIN — DILTIAZEM HYDROCHLORIDE 30 MG: 60 TABLET, FILM COATED ORAL at 10:27

## 2022-10-11 RX ADMIN — PROPOFOL 50 MG: 10 INJECTION, EMULSION INTRAVENOUS at 14:47

## 2022-10-11 RX ADMIN — POTASSIUM CHLORIDE 40 MEQ: 20 TABLET, EXTENDED RELEASE ORAL at 09:00

## 2022-10-11 RX ADMIN — OXYCODONE 5 MG: 5 TABLET ORAL at 10:33

## 2022-10-11 RX ADMIN — PROPOFOL 100 MG: 10 INJECTION, EMULSION INTRAVENOUS at 14:39

## 2022-10-11 RX ADMIN — SODIUM CHLORIDE, PRESERVATIVE FREE 10 ML: 5 INJECTION INTRAVENOUS at 21:42

## 2022-10-11 RX ADMIN — Medication 40 MG: at 03:26

## 2022-10-11 RX ADMIN — CEFTRIAXONE SODIUM 1000 MG: 1 INJECTION, POWDER, FOR SOLUTION INTRAMUSCULAR; INTRAVENOUS at 16:11

## 2022-10-11 RX ADMIN — SODIUM CHLORIDE: 9 INJECTION, SOLUTION INTRAVENOUS at 14:35

## 2022-10-11 RX ADMIN — DILTIAZEM HYDROCHLORIDE 7.5 MG/HR: 5 INJECTION, SOLUTION INTRAVENOUS at 04:00

## 2022-10-11 RX ADMIN — SODIUM CHLORIDE: 9 INJECTION, SOLUTION INTRAVENOUS at 03:25

## 2022-10-11 RX ADMIN — Medication 10 ML: at 21:41

## 2022-10-11 RX ADMIN — DILTIAZEM HYDROCHLORIDE 30 MG: 60 TABLET, FILM COATED ORAL at 18:14

## 2022-10-11 ASSESSMENT — PAIN DESCRIPTION - ORIENTATION
ORIENTATION: LEFT;RIGHT
ORIENTATION: RIGHT;LEFT
ORIENTATION: RIGHT;LEFT

## 2022-10-11 ASSESSMENT — LIFESTYLE VARIABLES
SMOKING_STATUS: 1
HOW OFTEN DO YOU HAVE A DRINK CONTAINING ALCOHOL: 2-3 TIMES A WEEK
HOW MANY STANDARD DRINKS CONTAINING ALCOHOL DO YOU HAVE ON A TYPICAL DAY: 1 OR 2

## 2022-10-11 ASSESSMENT — PAIN DESCRIPTION - FREQUENCY: FREQUENCY: CONTINUOUS

## 2022-10-11 ASSESSMENT — PAIN SCALES - GENERAL
PAINLEVEL_OUTOF10: 0
PAINLEVEL_OUTOF10: 9

## 2022-10-11 ASSESSMENT — PAIN DESCRIPTION - LOCATION
LOCATION: SHOULDER;NECK
LOCATION: NECK;SHOULDER
LOCATION: BACK;ABDOMEN

## 2022-10-11 ASSESSMENT — PAIN DESCRIPTION - DESCRIPTORS
DESCRIPTORS: THROBBING
DESCRIPTORS: ACHING;DISCOMFORT
DESCRIPTORS: ACHING
DESCRIPTORS: ACHING;THROBBING

## 2022-10-11 ASSESSMENT — PAIN - FUNCTIONAL ASSESSMENT
PAIN_FUNCTIONAL_ASSESSMENT: ACTIVITIES ARE NOT PREVENTED
PAIN_FUNCTIONAL_ASSESSMENT: ACTIVITIES ARE NOT PREVENTED
PAIN_FUNCTIONAL_ASSESSMENT: 0-10
PAIN_FUNCTIONAL_ASSESSMENT: PREVENTS OR INTERFERES SOME ACTIVE ACTIVITIES AND ADLS

## 2022-10-11 ASSESSMENT — PAIN DESCRIPTION - PAIN TYPE: TYPE: CHRONIC PAIN

## 2022-10-11 ASSESSMENT — ENCOUNTER SYMPTOMS: SHORTNESS OF BREATH: 0

## 2022-10-11 NOTE — FLOWSHEET NOTE
10/11/22 1222   Vitals   /70   Temp 98.2 °F (36.8 °C)   Heart Rate 77   Resp 18   SpO2 94 %   Level of Consciousness 0   MEWS Score 1   Transfuse RBC   Time Blood Reaches Vein 1227   Charges - Once Per Day (Any Number of Units)   $Blood Administration Yes       Second unit of PRB started at this time. Pt stable. Consent for EGD obtained at this time. Pt stable.     James Amaya RN

## 2022-10-11 NOTE — ANESTHESIA PRE PROCEDURE
Department of Anesthesiology  Preprocedure Note       Name:  Charlee Tsai   Age:  79 y.o.  :  1955                                          MRN:  [id number]         Date:  10/11/2022      Surgeon: Herminia Alvarado):  Annie Hunter MD    Procedure: Procedure(s):  EGD W/ANES. Medications prior to admission:   Prior to Admission medications    Medication Sig Start Date End Date Taking? Authorizing Provider   oxyCODONE-acetaminophen (PERCOCET) 5-325 MG per tablet Take 1 tablet by mouth every 12 hours as needed.  22   Historical Provider, MD   pantoprazole (PROTONIX) 40 MG tablet TAKE 1 TABLET BY MOUTH IN THE MORNING BEFORE BREAKFAST 22   Seven Sullivan MD   albuterol-ipratropium (COMBIVENT RESPIMAT)  MCG/ACT AERS inhaler Inhale 1 puff into the lungs every 6 hours as needed for Wheezing  Patient not taking: No sig reported 21   eSven Sullivan MD       Current medications:    Current Facility-Administered Medications   Medication Dose Route Frequency Provider Last Rate Last Admin    ondansetron (ZOFRAN-ODT) disintegrating tablet 4 mg  4 mg Oral Q8H PRN Erika Horn MD        Or    [facility]ron [facility] injection 4 mg  4 mg IntraVENous Q6H PRN Erika Horn MD        therapeutic multivitamin-minerals 1 tablet  1 tablet Oral Daily Erika Horn MD        thiamine tablet 100 mg  100 mg Oral Daily Erika Horn MD        folic acid (FOLVITE) tablet 1 mg  1 mg Oral Daily Erika Horn MD        oxyCODONE (ROXICODONE) immediate release tablet 5 mg  5 mg Oral Q12H PRN Erika Horn MD   5 mg at 10/11/22 1033    0.9 % sodium chloride infusion   IntraVENous Continuous Erika Horn  mL/hr at 10/11/22 0325 New Bag at 10/11/22 0325    sodium chloride flush 0.9 % injection 5-40 mL  5-40 mL IntraVENous 2 times per day Erika Horn MD        sodium chloride flush 0.9 % injection 5-40 mL  5-40 mL IntraVENous PRN Erika Horn MD        0.9 % sodium chloride infusion   IntraVENous PRN Attila Gastelum MD        nicotine (NICODERM CQ) 21 MG/24HR 1 patch  1 patch TransDERmal Daily Attila Gastelum MD   1 patch at 10/11/22 0931    pantoprazole (PROTONIX) 40 mg in sodium chloride (PF) 10 mL injection  40 mg IntraVENous Q12H Attila Gastelum MD   40 mg at 10/11/22 0326    octreotide (SANDOSTATIN) 500 mcg in sodium chloride 0.9 % 100 mL infusion  25 mcg/hr IntraVENous Continuous Attila Gastelum MD 5 mL/hr at 10/11/22 0355 25 mcg/hr at 10/11/22 0355    albuterol-ipratropium (COMBIVENT RESPIMAT)  MCG/ACT inhaler 1 puff  1 puff Inhalation Q4H PRN Attila Gastelum MD        magnesium sulfate 1000 mg in dextrose 5% 100 mL IVPB  1,000 mg IntraVENous PRN Attila Gastelum MD        potassium chloride 10 mEq/100 mL IVPB (Peripheral Line)  10 mEq IntraVENous PRN Attila Gastelum MD        0.9 % sodium chloride infusion   IntraVENous PRN Attila Gastelum MD        dilTIAZem (CARDIZEM) tablet 30 mg  30 mg Oral 4 times per day Fer Glass MD   30 mg at 10/11/22 1027    famotidine (PEPCID) injection 20 mg  20 mg IntraVENous Once Aicha Bennett MD        lactated ringers infusion   IntraVENous Continuous Aicha Bennett MD        sodium chloride flush 0.9 % injection 5-40 mL  5-40 mL IntraVENous 2 times per day Aicha Bennett MD        sodium chloride flush 0.9 % injection 5-40 mL  5-40 mL IntraVENous PRN Aicha Bennett MD        0.9 % sodium chloride infusion   IntraVENous PRN Aicha Bennett MD        dilTIAZem 125 mg in dextrose 5 % 125 mL infusion  2.5-15 mg/hr IntraVENous Continuous Attila Gastleum MD   Stopped at 10/11/22 1027       Allergies:     Allergies   Allergen Reactions    Codeine Hives       Problem List:    Patient Active Problem List   Diagnosis Code    Open wound of finger with complication C71.828T    Chronic dermatitis L30.9    Chest pain R07.9    Unstable angina ([facility] [address] I20.0    Alcohol abuse F10.10    Hypokalemia E87.6    Thrombocytopenia (HCC) D69.6    Macrocytosis D75.89    Elevated transaminase level R74.01    Coronary artery disease involving native coronary artery of native heart without angina pectoris I25.10    Elevated LFTs R79.89    Hyperkalemia E87.5    Rectal mass K62.89    Proctitis K62.89    Right sided weakness R53.1    Cervical disc disease with myelopathy M50.00    Numbness R20.0    Foraminal stenosis of cervical region M48.02    Abnormal EKG I08.58    Alcoholic cirrhosis of liver without ascites (HCC) K70.30    Pancytopenia (HCC) D61.818    Hyponatremia E87.1    Smoker F17.200    Weakness R53.1    Multiple falls R29.6    History of hepatitis C Z86.19    History of cirrhosis Z87.19    Right hip pain M25.551    Coffee ground emesis K92.0    Multifocal atrial tachycardia (HCC) I47.1    Gastrointestinal hemorrhage K92.2    ABLA (acute blood loss anemia) D62    Cirrhosis of liver without ascites (HCC) K74.60       Past Medical History:        Diagnosis Date    COPD (chronic obstructive pulmonary disease) (HCC)     Hepatitis C antibody positive in blood 11/16/2017    History of blood transfusion     MI (myocardial infarction) [facility])        Past Surgical History:        Procedure Laterality Date    CARDIAC CATHETERIZATION      heart cath    COLONOSCOPY  11/17/2017    Initial Colonoscopy; Multiple Colon Polyps    ENDOSCOPY, COLON, DIAGNOSTIC      EYE SURGERY      cataract removal    WA NJX DX/THER SBST INTRLMNR CRV/THRC W/IMG GDN Right 8/13/2018    RIGHT CERVICAL SEVEN THORACIC ONE EPIDURAL STEROID INJECTION SITE CONFIRMED BY FLUOROSCOPY performed by Bela Suazo MD at [address]  01/18/2018    Gastritis, [place]       Social History:    Social History     Tobacco Use    Smoking status: Every Day     Packs/day: 1.00     Years: 50.00     Pack years: 50.00     Types: Cigarettes    Smokeless tobacco: Never    Tobacco comments:     refused, is seeing primary physician. Substance Use Topics    Alcohol use: Yes     Alcohol/week: 6.0 standard drinks     Types: 6 Cans of beer per week                                Ready to quit: Not Answered  Counseling given: Not Answered  Tobacco comments: refused, is seeing primary physician. Vital Signs (Current):   Vitals:    10/11/22 1222 10/11/22 1241 10/11/22 1347 10/11/22 1407   BP: 103/70 108/66 100/63 115/65   Pulse: 77 84 88 94   Resp: 18 19 19 17   Temp: 98.2 °F (36.8 °C) 98.2 °F (36.8 °C) 98.2 °F (36.8 °C) 99 °F (37.2 °C)   TempSrc: Oral Oral Oral Temporal   SpO2: 94% 95% 99% 99%   Weight:       Height:                                                  BP Readings from Last 3 Encounters:   10/11/22 115/65   05/03/22 120/80   11/16/21 120/80       NPO Status: Time of last liquid consumption: 1000 (water/ice)                        Time of last solid consumption: 1900                        Date of last liquid consumption: 10/11/22                        Date of last solid food consumption: 11/06/22    BMI:   Wt Readings from Last 3 Encounters:   10/11/22 143 lb 7 oz (65.1 kg)   05/03/22 160 lb (72.6 kg)   11/16/21 160 lb (72.6 kg)     Body mass index is 21.81 kg/m².     CBC:   Lab Results   Component Value Date/Time    WBC 5.0 10/11/2022 04:46 AM    RBC 2.30 10/11/2022 04:46 AM    HGB 8.1 10/11/2022 02:11 PM    HCT 25.5 10/11/2022 02:11 PM    MCV 84.0 10/11/2022 04:46 AM    RDW 22.1 10/11/2022 04:46 AM    PLT 55 10/11/2022 04:46 AM       CMP:   Lab Results   Component Value Date/Time     10/11/2022 04:46 AM    K 3.0 10/11/2022 04:46 AM     10/11/2022 04:46 AM    CO2 29 10/11/2022 04:46 AM    BUN 35 10/11/2022 04:46 AM    CREATININE 0.7 10/11/2022 04:46 AM    GFRAA >60 10/11/2022 04:46 AM    GFRAA >60 04/02/2010 07:25 AM    AGRATIO 1.0 10/11/2022 04:46 AM    LABGLOM >60 10/11/2022 04:46 AM    GLUCOSE 102 10/11/2022 04:46 AM    PROT 6.3 10/11/2022 04:46 AM    PROT 8.0 04/02/2010 07:25 AM    CALCIUM 8.0 10/11/2022 04:46 AM    BILITOT 6.2 10/11/2022 04:46 AM    ALKPHOS 83 10/11/2022 04:46 AM    AST 59 10/11/2022 04:46 AM    ALT 22 10/11/2022 04:46 AM       POC Tests: No results for input(s): POCGLU, POCNA, POCK, POCCL, POCBUN, POCHEMO, POCHCT in the last 72 hours. Coags:   Lab Results   Component Value Date/Time    PROTIME 24.8 10/11/2022 04:46 AM    INR 2.26 10/11/2022 04:46 AM    APTT 40.2 10/11/2022 04:46 AM       HCG (If Applicable): No results found for: PREGTESTUR, PREGSERUM, HCG, HCGQUANT     ABGs: No results found for: PHART, PO2ART, UHA7VYE, DFU5ADN, BEART, J2LZVENN     Type & Screen (If Applicable):  No results found for: LABABO, LABRH    Drug/Infectious Status (If Applicable):  No results found for: HIV, HEPCAB    COVID-19 Screening (If Applicable):   Lab Results   Component Value Date/Time    COVID19 NOT DETECTED 10/10/2022 10:11 PM           Anesthesia Evaluation  Patient summary reviewed no history of anesthetic complications:   Airway: Mallampati: II  TM distance: >3 FB   Neck ROM: limited  Mouth opening: > = 3 FB   Dental:          Pulmonary: breath sounds clear to auscultation  (+) COPD (prn inhaler, \"not much\", no o2 req. ):  current smoker (1/2-1 ppd)    (-) asthma, shortness of breath, recent URI and sleep apnea          Patient did not smoke on day of surgery.                  Cardiovascular:    (+) past MI: > 6 months, CAD: non-obstructive, dysrhythmias (multifocal at tach/af):,     (-) pacemaker, hypertension,  angina and  CHF    ECG reviewed  Rhythm: irregular  Rate: normal  Echocardiogram reviewed         Beta Blocker:  Not on Beta Blocker         Neuro/Psych:   (+) seizures (x1, distant , ? etoh, no meds current):, neuromuscular disease (ddd):, psychiatric history (etoh abuse):   (-) TIA and CVA           GI/Hepatic/Renal:   (+) hepatitis: C, liver disease (etoh cirrhosis):,      (-) GERD, no renal disease, bowel prep and no morbid obesity       Endo/Other:    (+) blood dyscrasia (anemia, dec plts, abnl clotting labs): arthritis:., electrolyte abnormalities, no malignancy/cancer. (-) diabetes mellitus, hypothyroidism, hyperthyroidism, no malignancy/cancer               Abdominal:       Abdomen: soft. Vascular: Other Findings:           Anesthesia Plan      TIVA     ASA 3       Induction: intravenous. MIPS: Prophylactic antiemetics administered. Anesthetic plan and risks discussed with patient. Plan discussed with CRNA. This pre-anesthesia assessment may be used as a history and physical.    DOS STAFF ADDENDUM:    Pt seen and examined, chart reviewed (including anesthesia, drug and allergy history). No interval changes to history and physical examination. Anesthetic plan, risks, benefits, alternatives, and personnel involved discussed with patient. Patient verbalized an understanding and agrees to proceed.       Nilsa Shirley MD  October 11, 2022  2:36 PM      Nilsa Shirley MD   10/11/2022

## 2022-10-11 NOTE — PROGRESS NOTES
RT Inhaler-Nebulizer Bronchodilator Protocol Note    There is a bronchodilator order in the chart from a provider indicating to follow the RT Bronchodilator Protocol and there is an Initiate RT Inhaler-Nebulizer Bronchodilator Protocol order as well (see protocol at bottom of note). CXR Findings:  XR CHEST PORTABLE    Result Date: 10/10/2022  1. Right AC joint is widened and an AC joint injury/separation is not excluded though this may be nonacute, correlate clinically. Otherwise no evidence of fracture. 2.  No acute cardiopulmonary disease noted. The findings from the last RT Protocol Assessment were as follows:   History Pulmonary Disease: (P) Chronic pulmonary disease  Respiratory Pattern: (P) Regular pattern and RR 12-20 bpm  Breath Sounds: (P) Clear breath sounds  Cough: (P) Strong, spontaneous, non-productive  Indication for Bronchodilator Therapy:    Bronchodilator Assessment Score: (P) 2    Aerosolized bronchodilator medication orders have been revised according to the RT Inhaler-Nebulizer Bronchodilator Protocol below. Respiratory Therapist to perform RT Therapy Protocol Assessment initially then follow the protocol. Repeat RT Therapy Protocol Assessment PRN for score 0-3 or on second treatment, BID, and PRN for scores above 3. No Indications - adjust the frequency to every 6 hours PRN wheezing or bronchospasm, if no treatments needed after 48 hours then discontinue using Per Protocol order mode. If indication present, adjust the RT bronchodilator orders based on the Bronchodilator Assessment Score as indicated below. Use Inhaler orders unless patient has one or more of the following: on home nebulizer, not able to hold breath for 10 seconds, is not alert and oriented, cannot activate and use MDI correctly, or respiratory rate 25 breaths per minute or more, then use the equivalent nebulizer order(s) with same Frequency and PRN reasons based on the score.   If a patient is on this medication at home then do not decrease Frequency below that used at home. 0-3 - enter or revise RT bronchodilator order(s) to equivalent RT Bronchodilator order with Frequency of every 4 hours PRN for wheezing or increased work of breathing using Per Protocol order mode. 4-6 - enter or revise RT Bronchodilator order(s) to two equivalent RT bronchodilator orders with one order with BID Frequency and one order with Frequency of every 4 hours PRN wheezing or increased work of breathing using Per Protocol order mode. 7-10 - enter or revise RT Bronchodilator order(s) to two equivalent RT bronchodilator orders with one order with TID Frequency and one order with Frequency of every 4 hours PRN wheezing or increased work of breathing using Per Protocol order mode. 11-13 - enter or revise RT Bronchodilator order(s) to one equivalent RT bronchodilator order with QID Frequency and an Albuterol order with Frequency of every 4 hours PRN wheezing or increased work of breathing using Per Protocol order mode. Greater than 13 - enter or revise RT Bronchodilator order(s) to one equivalent RT bronchodilator order with every 4 hours Frequency and an Albuterol order with Frequency of every 2 hours PRN wheezing or increased work of breathing using Per Protocol order mode.          Electronically signed by Siri Rosado RCP on 10/11/2022 at 2:57 AM

## 2022-10-11 NOTE — FLOWSHEET NOTE
10/11/22 0929   Vitals   /66   Temp 98 °F (36.7 °C)   Temp Source Oral   Heart Rate (!) 104   Resp 18   SpO2 96 %   Level of Consciousness 0   MEWS Score 2   Transfuse RBC   Patient Observed Initial 15 Min  Yes     First 15 minutes of blood transfusion is completed. No signs or symptoms of blood transfusion reaction noted by this RN. No active signs of distress. Pt stable.     Roxana Duran RN

## 2022-10-11 NOTE — ED NOTES
2107-Dr. Cheryl Jorgensen called and spoke with Dr. Natalee Ramos regarding GI consult.      Basil Chan  10/10/22 2113

## 2022-10-11 NOTE — OP NOTE
[address] [facility],  [address] [place], [place] [address]jabier Rios, [facility]  Phone: [phone]  [address] [place] [address]  Phone: [phone]   MRF:[id number]    EGD Procedure Note    Patient: Jose Mei  : 1955    Procedure: Esophagogastroduodenoscopy with argon plasma coagulation    Date:  10/11/2022     Endoscopist:  Rafael Kelley MD    Referring Physician:  Ivana Trejo MD    Preoperative Diagnosis:  Hypomagnesemia [E83.42]  Coffee ground emesis [K92.0]  Atrial fibrillation with rapid ventricular response ([address] [I48.91]  Gastrointestinal hemorrhage, unspecified gastrointestinal hemorrhage type [K92.2]    Postoperative Diagnosis: LA grade B erosive esophagitis. Moderate portal hypertensive gastropathy, duodenal angioectasias. Anesthesia: Anesthesia: MAC  Sedation: Propofol per anesthesia  Start Time: 7311  Stop Time: 8102  ASA Class: 2  Mallampati: II (soft palate, uvula, fauces visible)    Indications: This is a 79y.o. year old male for chronic hepatitis C and Alcohol cirrhosis, upper GI bleed from esophageal varices, Acute MI, and COPD presents with coffee ground emesis and melena. Procedure Details  Informed consent was obtained for the procedure, including conscious sedation. Risks of pancreatitis, infection, perforation, hemorrhage, adverse drug reaction and aspiration were discussed. The patient was placed in the left lateral decubitus position. Based on the pre-procedure assessment, including review of the patient's medical history, medications, allergies, and review of systems, he had been deemed to be an appropriate candidate for the above IV sedation; he was therefore sedated with the medications listed above. He was monitored continuously with ECG tracing, pulse oximetry, blood pressure monitoring, and direct observation. A gastroscope was inserted into the mouth and advanced under direct vision to third portion of the duodenum.   A careful inspection was made as the gastroscope was withdrawn, including a retroflexed view of the proximal stomach including views of the incisura and cardia; findings and interventions are described below. Appropriate photodocumentation Was Obtained. If photos taken, they were ordered to be scanned into the medical record. Findings:  Normal upper and mid esophagus. No evidence of esophageal or gastric varices  LA grade B erosive esophagitis in the distal esophagus. Irregular Z-line at 40 cm from incisors  Moderate erythematous mosaic appearing mucosa in antrum and body of stomach suggestive of moderate portal hypertensive gastropathy. Three 3 to 6 mm in non bleeding arteriovenous malformation seen in the duodenal bulb and second portion of duodenum. Coagulation for bleeding prevention using argon plasma at 1.4 liters/minute and 25 juarez was successful. Specimens: Was Not Obtained    Complications:   None; patient tolerated the procedure well. Disposition:   PACU - hemodynamically stable. Estimated Blood loss:  none    Impression:   Normal upper and mid esophagus. No evidence of esophageal or gastric varices  LA grade B erosive esophagitis in the distal esophagus. Irregular Z-line at 40 cm from incisors  Moderate portal hypertensive gastropathy. Three 3 to 6 mm in nonbleeding arteriovenous malformation seen in the duodenal bulb and second portion of duodenum. Coagulation for bleeding prevention using argon plasma performed. Recommendations:  -Monitor and transfuse to keep Hgb > 7 g/dL;   -Continue Protonix 40 mg IV BID  -Discontinue octreotide gtt. -Rocephin 1gm IV daily for sepsis prophylaxis  -To consider colonoscopy and/or push enteroscopy if ongoing GI bleeding  -GI to follow      Maya Marcus MD   [address]  10/11/22 2:54 PM EDT    Please note that some or all of this record was generated using voice recognition software.  If there are any questions about the content of this document, please contact the author as some errors in translation may have occurred.

## 2022-10-11 NOTE — FLOWSHEET NOTE
10/11/22 1545   Vital Signs   Heart Rate 80   BP (!) 103/55   MAP (Calculated) 71     Pt returned from EGD in stable condition.     India Lr RN

## 2022-10-11 NOTE — H&P
Hospital Medicine History & Physical      PCP: Rossi Franks MD    Date of Service: Pt seen/examined on 10/10/22 and admitted on 10/10/22 to Inpatient    Chief Complaint   Patient presents with    Emesis     Coffee ground emesis and black stool. Has had a couple of falls due to dizziness. Hx of cirrhosis       History Of Present Illness: The patient is a 79 y.o. male with PMH below, presents with coffee ground emesis, N/V, melena, epigastric pain, falls, lightheadedness, Hx ETOH cirrhosis. Pt reports that he has not felt well the last 2 d. He fell yd which he related to feeling lightheaded. He began having N/V yd, worsened overnight and today. Changed into coffee ground emesis. He also describes dark stools. He has had epigastric pain since yd. He has hax of ETOH cirrhosis. He denies to me that he drinks anymore. However, his wife reported to ER that he may still be drinking. He was also found to be in a fib w/ RVR in the ED. Past Medical History:        Diagnosis Date    COPD (chronic obstructive pulmonary disease) (HCC)     Hepatitis C antibody positive in blood 11/16/2017    History of blood transfusion     MI (myocardial infarction) [facility])        Past Surgical History:        Procedure Laterality Date    CARDIAC CATHETERIZATION      heart cath    COLONOSCOPY  11/17/2017    Initial Colonoscopy; Multiple Colon Polyps    ENDOSCOPY, COLON, DIAGNOSTIC      EYE SURGERY      cataract removal    MA NJX DX/THER SBST INTRLMNR CRV/THRC W/IMG GDN Right 8/13/2018    RIGHT CERVICAL SEVEN THORACIC ONE EPIDURAL STEROID INJECTION SITE CONFIRMED BY FLUOROSCOPY performed by Brenda Bautista MD at [place] [address]  01/18/2018    Gastritis, [place]       Medications Prior to Admission:    Prior to Admission medications    Medication Sig Start Date End Date Taking?  Authorizing Provider   pantoprazole (PROTONIX) 40 MG tablet TAKE 1 TABLET BY MOUTH IN THE MORNING BEFORE BREAKFAST 2/23/22   Nigel Romero MD   albuterol-ipratropium (COMBIVENT RESPIMAT)  MCG/ACT AERS inhaler Inhale 1 puff into the lungs every 6 hours as needed for Wheezing 9/21/21   Nigel Romero MD   ferrous sulfate (IRON 325) 325 (65 Fe) MG tablet Take 1 tablet by mouth three times a week  Patient not taking: Reported on 11/16/2021 9/22/21 3/21/22  Nigel Romero MD       Allergies:  Codeine    Social History:    TOBACCO:   reports that he has been smoking cigarettes. He has a 50.00 pack-year smoking history. He has never used smokeless tobacco.  ETOH:   reports that he does not currently use alcohol. Family History:  Reviewed in detail and negative for DM, Early CAD, Cancer (except as below). Positive as follows:        Problem Relation Age of Onset    Other Mother     Cancer Mother     High Blood Pressure Father        REVIEW OF SYSTEMS:   Pertinent positives/negatives as follows: coffee ground emesis, N/V, melena, epigastric pain, falls, lightheadedness, Hx ETOH cirrhosis, and as discussed in HPI, otherwise a complete ROS performed and all other systems are negative. PHYSICAL EXAM PERFORMED:  BP 93/60   Pulse (!) 104   Temp 98.1 °F (36.7 °C) (Oral)   Resp 17   Ht 5' 8\" (1.727 m)   Wt 145 lb (65.8 kg)   SpO2 99%   BMI 22.05 kg/m²   GEN:  A&Ox3, NAD. HEENT:  NC/AT,EOMI, semi dry MM, no erythema/exudates or visible masses. CVS:  Normal S1,S2. Tachy ii. Without M/G/R.   LUNG:   CTA-B. No wheezes, rales or rhonchi. ABD:  Soft, ND/NT, BS+ x4. Without G/R.  EXT: 2+ pulses, no c/c/e. R forearm ttp. Brisk cap refill. PSY:  Thought process intact, affect appropriate. RUEL:  CN III-XII grossly intact. Moves all 4 spontaneously. Sensory grossly intact. SKIN: No rash or lesions on visible skin.     Chart review shows recent radiographs:  XR RADIUS ULNA RIGHT (2 VIEWS)    Result Date: 10/10/2022  EXAM: XR Right Forearm, 2 Views EXAM DATE/TIME: 10/10/2022 4:25 pm CLINICAL HISTORY: ORDERING SYSTEM PROVIDED  fall  TECHNOLOGIST PROVIDED HISTORY:  Reason for exam:->fall  Reason for Exam: fell TECHNIQUE: Frontal and lateral views of the right forearm. COMPARISON: No relevant prior studies available. FINDINGS: Bones/joints:  No acute findings. No acute fracture. No dislocation. Soft tissues:  Radiolucency and some soft tissue irregularity involving the anteromedial forearm which may just be due to a bandage, though a soft tissue injury is not excluded. Radiolucency and some soft tissue irregularity involving the anteromedial forearm which may just be due to a bandage, though a soft tissue injury is not excluded. No evidence of acute fracture. CT HEAD WO CONTRAST    Result Date: 10/10/2022  EXAM: CT Head Without Intravenous Contrast EXAM DATE/TIME: 10/10/2022 4:30 pm CLINICAL HISTORY: ORDERING SYSTEM PROVIDED  fall  TECHNOLOGIST PROVIDED HISTORY:  Reason for exam:->fall  Has a \"code stroke\" or \"stroke alert\" been called? ->No  Decision Support Exception - unselect if not a suspected or confirmed emergency medical condition->Emergency Medical Condition (MA)  Reason for Exam: pt falling TECHNIQUE: Axial computed tomography images of the head/brain without intravenous contrast.  This CT exam was performed using one or more of the following dose reduction techniques:  automated exposure control, adjustment of the mA and/or kV according to patient size, and/or use of iterative reconstruction technique. COMPARISON: 07/08/2018 FINDINGS: Brain:  No acute ischemic change or other acute intracranial abnormality is noted. No evidence of intracranial hemorrhage. Prominence of the sulci and/or CSF spaces suggests a degree of cerebral atrophy. Ventricles:  No acute findings. No ventriculomegaly. Bones/joints:  No acute findings. No acute fracture. Soft tissues:  No acute findings.  Sinuses:  Right more so than left frontal and minimal bilateral maxillary chronic sinusitis. Mastoid air cells:  Unremarkable as visualized. No mastoid effusion. 1.  No acute ischemic change or other acute intracranial abnormality is noted. No evidence of intracranial hemorrhage. 2.  Prominence of the sulci and/or CSF spaces suggests a degree of cerebral atrophy. 3.  Right more so than left frontal and minimal bilateral maxillary chronic sinusitis. XR CHEST PORTABLE    Result Date: 10/10/2022  EXAM: XR Chest, 1 View EXAM DATE/TIME: 10/10/2022 4:25 pm CLINICAL HISTORY: ORDERING SYSTEM PROVIDED  tachycardia, dizzy, fall  TECHNOLOGIST PROVIDED HISTORY:  Reason for exam:->tachycardia, dizzy, fall  Reason for Exam: tachycardia, dizzy, fall TECHNIQUE: Frontal view of the chest. COMPARISON: 09/18/2021 FINDINGS: Lungs:  No acute findings. Small calcified granuloma in the lower lateral left lung field. Pleural space:  No acute findings. No pneumothorax. Heart:  No acute findings. No cardiomegaly. Mediastinum:  No acute findings. Bones/joints:  Right AC joint is widened and an AC joint injury/separation is not excluded though this may be nonacute, correlate clinically. Otherwise no evidence of fracture. 1.  Right AC joint is widened and an AC joint injury/separation is not excluded though this may be nonacute, correlate clinically. Otherwise no evidence of fracture. 2.  No acute cardiopulmonary disease noted.        EKG:    EKG 12 Lead [[id number]]    Collected: 10/10/22 1722    Updated: 10/10/22 1810     Ventricular Rate 125 BPM    Atrial Rate 125 BPM    P-R Interval 152 ms    QRS Duration 96 ms    Q-T Interval 350 ms    QTc Calculation (Bazett) 505 ms    P Axis 69 degrees    R Axis -41 degrees    T Axis 80 degrees    Diagnosis --    Sinus tachycardia with Premature supraventricular complexes and with occasional Premature ventricular complexesLeft axis deviationSeptal infarct (cited on or before 10-OCT-2022)Abnormal ECGWhen compared with ECG of 10-OCT-2022 17:21, (unconfirmed)Premature supraventricular complexes are now PresentQuestionable change in QRS axis    EKG 12 Lead [[id number]]    Collected: 10/10/22 1605    Updated: 10/10/22 1810     Ventricular Rate 174 BPM    Atrial Rate 174 BPM    P-R Interval 144 ms    QRS Duration 90 ms    Q-T Interval 284 ms    QTc Calculation (Bazett) 483 ms    R Axis -50 degrees    T Axis 107 degrees    Diagnosis Sinus tachycardia with Premature atrial complexesLeft axis deviationSeptal infarct , age undeterminedST & T wave abnormality, consider lateral ischemiaAbnormal ECGWhen compared with ECG of 17-APR-2021 10:37, (unconfirmed)Significant changes have occurred   Think most likely a fib w/ RVR vs sinus tachy. CBC:  Recent Labs     10/10/22  1612   WBC 6.8   HGB 7.8*   HCT 25.1*   PLT 89*      RENAL  Recent Labs     10/10/22  1612      K 3.5   CL 95*   CO2 19*   BUN 36*   CREATININE 1.0   GLUCOSE 143*     LFT'S:  Recent Labs     10/10/22  1612   AST 60*   ALT 28   BILITOT 6.6*   ALKPHOS 102     CARDIAC ENZYMES:   Recent Labs     10/10/22  1612   TROPONINI <0.01     LACTIC ACID:  Recent Labs     10/10/22  2136   LACTA 4.8*     U/A:  Ordered and pending    PHYSICIAN CERTIFICATION  I certify that Mauro Montes is expected to be hospitalized for 2 midnights based on the following assessment and plan:    ASSESSMENT/PLAN:  GI bleed, likely upper, coffee ground emesis. Hemoccult +, HGB currently 7.8, previous  was 9.7 on 9/16/2022. Will plan to transfuse for HGB < 7.0. Hx of ETOH cirrhosis so will start octreotide. IV PPI BID, H&H q6h, GI c/s. Admit to PCU. Acute blood loss anemia, as above. A fib w/ RVR vs sinus tachy. Cont Cardizem. Tele and serial trops. Not a candidate for [facility] at this time 2/2 GI bleed. Cards c/s. Lactic acidosis, 4.8. IVF and repeats ordered. Hypomagnesemia, 1.30.  4 g of MgSO4 ordered. Thrombocytopenia, 89. Appears chronic. Repeat in am.   Tobacco Abuse, counseled cessation, 21 mg nicotine patch.    Hx ETOH cirrhosis, denies current ETOH. Per Dr. Ninoska Nielsen pt's wife reported she suspects pt is still drinking. AST:ALT is ~2:1. RAFI: none detected. Banana bag x1. PO MVT, thiamine and folate. Monitor for need for CIWA. DVT Prophylaxis: SCD  Diet: NPO  Code Status: Full Code   PT/OT Eval Status: Will order if needed and as patient condition allows  Dispo - Admit to inpatient PCU    Ena Najjar, MD    Thank you Shannon Andrews MD for the opportunity to be involved in this patient's care. If you have any questions or concerns please feel free to contact me via the [employer] at [phone]. This chart was generated using the [address] [place] [address] [address] dictation system. I created this record but it may contain dictation errors given the limitations of this technology.

## 2022-10-11 NOTE — PROGRESS NOTES
14 Soledad Bowen De M[facility]ic[facility] re consult spoke with Wolf Come from answering service. Call will go out to Dr Garth Verdin.  This was recalled due to the fact it  was called to the wrong GI group [facility] 0796 10-11-22

## 2022-10-11 NOTE — ANESTHESIA POSTPROCEDURE EVALUATION
Department of Anesthesiology  Postprocedure Note    Patient: Romulo Farr  MRN: [id number]  YOB: 1955  Date of evaluation: 10/11/2022      Procedure Summary     Date: 10/11/22 Room / Location: [address] [place] [address]    Anesthesia Start: 0067 Anesthesia Stop: 3931    Procedure: EGD CONTROL HEMORRHAGE Diagnosis:       Melena      (MELENA)    Surgeons: Ivon Rodriguez MD Responsible Provider: Enid Rawls MD    Anesthesia Type: TIVA ASA Status: 3          Anesthesia Type: No value filed.     Aleta Phase I: Aleta Score: 10    Aleta Phase II:      Vitals:    10/11/22 1520 10/11/22 1525 10/11/22 1530 10/11/22 1535   BP: 109/68 111/65 (!) 112/54    Pulse: 92 89 87 92   Resp: 18 18 16 10   Temp:       TempSrc:       SpO2: 96% 95% 95% 94%   Weight:       Height:         Anesthesia Post Evaluation    Patient location during evaluation: bedside  Patient participation: complete - patient participated  Level of consciousness: awake and alert  Airway patency: patent  Nausea & Vomiting: no nausea  Complications: no  Cardiovascular status: hemodynamically stable  Respiratory status: acceptable  Hydration status: euvolemic

## 2022-10-11 NOTE — ED NOTES
2110-Perfect Serve sent to Dr. Maggie Lloyd for consult. Ceasar Erickson  10/10/22 2112    2127-Call back received from Dr. Maggie Lloyd spoke with Dr. Mayra Osman.       Ceasar [facility]  10/10/22 2212

## 2022-10-11 NOTE — PROGRESS NOTES
Patient admitted to room 314 from ED. Patient oriented to room, call light, bed rails, phone, lights and bathroom. Patient instructed about the schedule of the day including: vital sign frequency, lab draws, possible tests, frequency of MD and staff rounds, daily weights, I &O's and prescribed diet. 50 Telemetry box in place, patient aware of placement and reason. Bed locked, in lowest position, side rails up 2/4, call light within reach. Recliner Assessment  Patient is not able to demonstrate the ability to move from a reclining position to an upright position within the recliner due to Unsteady on feet. Frequent falls. Westley Leach 4 Eyes Skin Assessment     The patient is being assess for   Admission    I agree that 2 RN's have performed a thorough Head to Toe Skin Assessment on the patient. ALL assessment sites listed below have been assessed. Areas assessed for pressure by both nurses:   [x]   Head, Face, and Ears   [x]   Shoulders, Back, and Chest, Abdomen  [x]   Arms, Elbows, and Hands   [x]   Coccyx, Sacrum, and Ischium  [x]   Legs, Feet, and Heels  Scattered bruising        Skin Assessed Under all Medical Devices by both nurses:                 **SHARE this note so that the co-signing nurse is able to place an eSignature**    Co-signer eSignature: Electronically signed by Leelee Butterfield RN on 10/11/22 at 5:56 AM EDT    Does the Patient have Skin Breakdown related to pressure?   No              Guillermo Prevention initiated:  No   Wound Care Orders initiated:  NA      [facility] nurse consulted for Pressure Injury (Stage 3,4, Unstageable, DTI, NWPT, Complex wounds)and New or Established Ostomies:  NA      Primary Nurse eSignature: Electronically signed by Lita Gilbert RN on 10/11/22 at 4:23 AM EDT

## 2022-10-11 NOTE — PROGRESS NOTES
Patient is in bed with eyes closed. Respirations easy. Patient denies any pain or discomfort. Call light within reach.

## 2022-10-11 NOTE — PROGRESS NOTES
Progress Note    Admit Date:  10/10/2022    Pt presented to the ED with complaints of melena and coffee-ground emesis since Sunday. Pt noted to be anemic. Afib with RVR vs sinus tachycardia. Cardiology  and GI consulted. Subjective:  Mr. Kaylee Sawyer is resting in bed. On oxygen 2 liters. Stated he is having some abdominal pain. No further coffee-ground emesis. Pt denies any recent alcohol use. Objective:   Patient Vitals for the past 4 hrs:   BP Temp Temp src Pulse Resp SpO2   10/11/22 1103 -- -- -- -- 18 --   10/11/22 1033 -- -- -- -- 17 --   10/11/22 1027 96/66 -- -- -- -- --   10/11/22 0929 107/66 98 °F (36.7 °C) Oral (!) 104 18 96 %   10/11/22 0900 (!) 105/55 97.7 °F (36.5 °C) Oral 90 17 98 %          Intake/Output Summary (Last 24 hours) at 10/11/2022 1151  Last data filed at 10/11/2022 0610  Gross per 24 hour   Intake --   Output 200 ml   Net -200 ml       Physical Exam:    Gen: No distress. Alert. Appears chronically ill and pale   Eyes: PERRL. No sclera icterus. No conjunctival injection. ENT: No discharge. Pharynx clear. Neck: No JVD. Trachea midline. Resp: No accessory muscle use. No crackles. No wheezes. No rhonchi. CV: Regular rate. Regular rhythm. No murmur. No rub. No edema. Capillary Refill: Brisk,< 3 seconds   Peripheral Pulses: +2 palpable, equal bilaterally   GI: diffuse TTP, Non-distended. Normal bowel sounds. No ascites   Skin: Warm and dry. No nodule on exposed extremities. No rash on exposed extremities. M/S: No cyanosis. No joint deformity. No clubbing. Neuro: Awake. Grossly nonfocal    Psych: Oriented x 3. No anxiety or agitation. Sara Mckeon MD have reviewed the chart on [address] [place] [address] and personally interviewed and examined patient, reviewed the data (labs and imaging) and after discussion with my PA formulated the plan. Agree with note with the following edits.       Physical exam:    /66   Pulse 84   Temp 98.2 °F (36.8 °C) (Oral)   Resp 19   Ht 5' 8\" (1.727 m)   Wt 143 lb 7 oz (65.1 kg)   SpO2 95%   BMI 21.81 kg/m²     Gen: No distress. Alert. Eyes: PERRL. No sclera icterus. No conjunctival injection. ENT: No discharge. Pharynx clear. Neck: Trachea midline. Normal thyroid. Resp: No accessory muscle use. No crackles. No wheezes. No rhonchi. No dullness on percussion. CV: Regular rate. Regular rhythm. No murmur or rub. No edema. GI: Non-tender. Non-distended. No masses. No organomegaly. Normal bowel sounds. No hernia. Skin: Warm and dry. No nodule on exposed extremities. No rash on exposed extremities. Lymph: No cervical LAD. No supraclavicular LAD. M/S: No cyanosis. No joint deformity. No clubbing. Neuro: Awake. Moves all 4 extremities, non focal  Psych: Oriented x 3. No anxiety or agitation.            JUDY Aranda       Scheduled Meds:   multivitamin  1 tablet Oral Daily    thiamine  100 mg Oral Daily    folic acid  1 mg Oral Daily    sodium chloride flush  5-40 mL IntraVENous 2 times per day    nicotine  1 patch TransDERmal Daily    pantoprazole (PROTONIX) 40 mg injection  40 mg IntraVENous Q12H    dilTIAZem  30 mg Oral 4 times per day       Continuous Infusions:   sodium chloride 100 mL/hr at 10/11/22 0325    sodium chloride      octreotide (SandoSTATIN) infusion 25 mcg/hr (10/11/22 0355)    sodium chloride      dilTIAZem (CARDIZEM) 125 mg in dextrose 5% 125 mL infusion Stopped (10/11/22 1027)       PRN Meds:  ondansetron **OR** ondansetron, oxyCODONE, sodium chloride flush, sodium chloride, albuterol-ipratropium, magnesium sulfate, potassium chloride, sodium chloride      Data:  CBC:   Recent Labs     10/10/22  1612 10/11/22  0446 10/11/22  0810   WBC 6.8 5.0  --    HGB 7.8* 6.0* 5.8*   HCT 25.1* 19.3* 18.6*   MCV 83.7 84.0  --    PLT 89* 55*  --      BMP:   Recent Labs     10/10/22  1612 10/11/22  0446    141   K 3.5 3.0*   CL 95* 102   CO2 19* 29   BUN 36* 35*   CREATININE 1.0 0.7*     LIVER PROFILE: Recent Labs     10/10/22  1612 10/11/22  0446   AST 60* 59*   ALT 28 22   BILITOT 6.6* 6.2*   ALKPHOS 102 83     PT/INR:   Recent Labs     10/11/22  0446   PROTIME 24.8*   INR 2.26*       CULTURES  COVID/Influenza: not detected    Blood Cultures: pending      RADIOLOGY  CT HEAD WO CONTRAST   Final Result      1. No acute ischemic change or other acute intracranial abnormality is   noted. No evidence of intracranial hemorrhage. 2.  Prominence of the sulci and/or CSF spaces suggests a degree of cerebral   atrophy. 3.  Right more so than left frontal and minimal bilateral maxillary chronic   sinusitis. XR CHEST PORTABLE   Final Result      1. Right AC joint is widened and an AC joint injury/separation is not   excluded though this may be nonacute, correlate clinically. Otherwise no   evidence of fracture. 2.  No acute cardiopulmonary disease noted. XR RADIUS ULNA RIGHT (2 VIEWS)   Final Result      Radiolucency and some soft tissue irregularity involving the anteromedial   forearm which may just be due to a bandage, though a soft tissue injury is   not excluded. No evidence of acute fracture.                Assessment/Plan:    GI bleed, acute blood loss anemia  Hx of alcohol cirrhosis- elevated liver enzymes   Hx of esophageal varies   - pt with +occult stool  - hemoglobin on admission 7.8--previously 9.7.  - 2 units of PRBC ordered this am for hemoglobin 5.8  - cont to monitor h/h, transfuse for Hb<7   - started on octreotide and protonix drip  - NPO  - GI plan for EGD today- per note, plan for erythromycin 250 mg IV once for promotility prior to EGD  - avoid ASA and NSAIDS    Thrombocytopenia  - 89--55  - likely 2/2 alcohol cirrhosis  - monitor    Sinus tachycardia vs Atrial fibrillation with RVR  - cardiology consulted  - rhythm noted with MAT likely exacerbated by GIB  - transfuse PRBC as needed  - started on diltiazem 30 mg every 6 hours, stop drip.  - adjust to control heart rate  - cardiology stated no need for further cardiac testing at this time  - replace electrolytes as needed. - monitor on telemetry      Lactic acidosis  - likely 2/2 above  - 4.8 on admission, trended down to 1.7  - continue IVF      Hypomagnesemia   - 1.3, replacement given  - 2.30 today       Hypokalemia  - 3.0 today  - 40 mEq given   - monitor     Tobacco Dependence  -Recommended cessation  - nicotine patch ordered       History of hepatitis C      DVT Prophylaxis: scds  Diet: Diet NPO Exceptions are: Ice Chips, Sips of Water with Meds  Code Status: Full Code      Jason Lundy, APRN - CNP     Agree with above    KEIKO Aranda.

## 2022-10-11 NOTE — PROGRESS NOTES
Bedside report and transfer of care given to [facility], [facility]. Pt currently resting in bed with the call light within reach. Pt denies any other care needs at this time. Pt stable at this time.       India Lr RN

## 2022-10-11 NOTE — ED NOTES
Blood Culture number 1 drawn from Left Wrist at [address] [facility]. Site cleaned with chlorhexidine swab for 30 seconds and allowed to dry for 30 seconds. Noted site was fully dry before cultures drawn. Blood Culture number 2 drawn from Left hand at [address] by [facility]. Site cleaned with chlorhexidine swab for 30 seconds and allowed to dry for 30 seconds. Noted site was fully dry before cultures drawn.           Bree Salgado RN  10/10/22 6149

## 2022-10-11 NOTE — FLOWSHEET NOTE
10/11/22 0914   Transfuse RBC   Time Blood Reaches Vein 0915   Charges - Once Per Day (Any Number of Units)   $Blood Administration Yes     Order to transfuse PRBC. Started at this time. Pt assessment complete; see flow sheets. Vitals completed. PO potassium given for hypokalemia. Cardizem gtt lowered to 5ml/hr due to HR being 90 and /55. Pt resting in bed. Pt denies any other care needs at this time. Pt stable. Will stay at bedside for first 15 minutes of transfusion reaction.     Malcolm Gentile RN

## 2022-10-11 NOTE — PROGRESS NOTES
Chief Complaint   Patient presents with    Emesis     Coffee ground emesis and black stool. Has had a couple of falls due to dizziness. Hx of cirrhosis       MEWS Score: 4/ Level of Consciousness: Alert (0)    Vitals:    10/10/22 2232 10/10/22 2237 10/10/22 2302 10/10/22 2332   BP: 93/60  96/66 (!) 93/59   Pulse: (!) 110 (!) 104 (!) 106 (!) 104   Resp: 13 17 14 17   Temp:       TempSrc:       SpO2: 100% 99% 98% 98%   Weight:       Height:              Allergies   Allergen Reactions    Codeine Hives       Current Facility-Administered Medications   Medication Dose Route Frequency Provider Last Rate Last Admin    dilTIAZem 125 mg in dextrose 5 % 125 mL infusion  2.5-15 mg/hr IntraVENous Continuous Michael Body MD 7.5 mL/hr at 10/10/22 2336 7.5 mg/hr at 10/10/22 2336    magnesium sulfate 2000 mg in 50 mL IVPB premix  2,000 mg IntraVENous Once Darwin Miranda MD         Current Outpatient Medications   Medication Sig Dispense Refill    oxyCODONE-acetaminophen (PERCOCET) 5-325 MG per tablet Take 1 tablet by mouth every 12 hours as needed. pantoprazole (PROTONIX) 40 MG tablet TAKE 1 TABLET BY MOUTH IN THE MORNING BEFORE BREAKFAST 30 tablet 0    albuterol-ipratropium (COMBIVENT RESPIMAT)  MCG/ACT AERS inhaler Inhale 1 puff into the lungs every 6 hours as needed for Wheezing (Patient not taking: Reported on 10/10/2022) 1 each 2      List of medications patient is currently taking is complete. Source of medications in list are patient.        Patient Active Problem List   Diagnosis    Open wound of finger with complication    Chronic dermatitis    Chest pain    Unstable angina (HCC)    Alcohol abuse    Hypokalemia    Thrombocytopenia (HCC)    Macrocytosis    Elevated transaminase level    Coronary artery disease involving native coronary artery of native heart without angina pectoris    Elevated LFTs    Hyperkalemia    Rectal mass    Proctitis    Right sided weakness    Cervical disc disease with myelopathy    Numbness    Foraminal stenosis of cervical region    Abnormal EKG    Alcoholic cirrhosis of liver without ascites (HCC)    Pancytopenia (HCC)    Hyponatremia    Smoker    Weakness    Multiple falls    History of hepatitis C    History of cirrhosis    Right hip pain    Coffee ground emesis                     Vitals:    10/10/22 2232 10/10/22 2237 10/10/22 2302 10/10/22 2332   BP: 93/60  96/66 (!) 93/59   Pulse: (!) 110 (!) 104 (!) 106 (!) 104   Resp: 13 17 14 17   Temp:       TempSrc:       SpO2: 100% 99% 98% 98%   Weight:       Height:              -----------------------------------------------------------------------------------------    Pt was updated about admission. **To be filled out after report is complete    Bed assignment:     Report called to * on *. Pertinent labs, allergies, medications and conditions were reviewed. Present Skin issues include . Patient belongings that will be going with the patient during admission include . Emergency contact * was notified of admission.      Electronically signed by Reji Benitez RN on 10/10/22 at 11:58 PM EDT

## 2022-10-11 NOTE — ACP (ADVANCE CARE PLANNING)
Advance Care Planning     Advance Care Planning Inpatient Note  [facility] Department    Today's Date: 10/11/2022  Unit: [facility] PCU TELEMETRY    Received request from IDT Member. Upon review of chart and communication with care team, patient's decision making abilities are not in question. . Patient was/were present in the room during visit. Goals of ACP Conversation:  Discuss advance care planning documents    Health Care Decision Makers:       Primary Decision Maker: Jose Ramon Tavera Spouse - [phone]    Secondary Decision Maker: Luz Nelson Child - [phone]  Summary:  Oneil Stout  No Decision Maker named by patient at this time    Jeevan 53 (Patient Wishes):  None     Assessment:  Patient states he has not thought much about health care decisions, especially at the end of his life. He does state he would want his wife to be his proxy if he is unable to make his medical decisions. He has one adult child, his daughter, who he would want as a backup. \"I think she can handle it. \"    Interventions:  Provided education on documents for clarity and greater understanding  Discussed and provided education on state decision maker hierarchy  Encouraged ongoing ACP conversation with future decision makers and loved ones    Care Preferences Communicated:   Ventilation:   If the patient, in their present state of health, suddenly became very ill and unable to breathe on their own,     the patient would desire the use of a ventilator (breathing machine). If their health worsens and it becomes clear that the change of recovery is unlikely,     the patient would NOT desire the use of a ventilator (breathing machine). Resuscitation:  In the event the patient's heart stopped as a result of an underlying serious health condition, the patient communicates a preference for      resuscitative attempts (CPR).   Patient states he would not want to live on life support if there was no hope of her getting better.     Outcomes/Plan:  ACP Discussion: Completed    Electronically signed by Rigoberto Dietz [facility] on 10/11/2022 at 9:47 AM

## 2022-10-11 NOTE — PLAN OF CARE
Problem: Discharge Planning  Goal: Discharge to home or other facility with appropriate resources  Outcome: Progressing  Flowsheets (Taken 10/11/2022 0125)  Discharge to home or other facility with appropriate resources:   Identify barriers to discharge with patient and caregiver   Identify discharge learning needs (meds, wound care, etc)   Refer to discharge planning if patient needs post-hospital services based on physician order or complex needs related to functional status, cognitive ability or social support system   Arrange for needed discharge resources and transportation as appropriate     Problem: Pain  Goal: Verbalizes/displays adequate comfort level or baseline comfort level  Outcome: Progressing     Problem: Safety - Adult  Goal: Free from fall injury  Outcome: Progressing

## 2022-10-11 NOTE — FLOWSHEET NOTE
10/11/22 1241   Vitals   /66   Temp 98.2 °F (36.8 °C)   Temp Source Oral   Heart Rate 84   Resp 19   SpO2 95 %   Level of Consciousness 0   MEWS Score 1   Transfuse RBC   Patient Observed Initial 15 Min  Yes     No signs or symptoms of transfusion reaction after the first 15 minutes of second RBC. Pt resting in bed. Pt stable.     Julisa Navas RN

## 2022-10-11 NOTE — CONSULTS
Consultation Note    Patient Name: Leeann Simon  : 1955  Age: 79 y.o. Admitting Physician: Francis Flores*   Date of Admission: 10/10/2022  3:49 PM   Primary Care Physician: King Donaldo MD        Leeann Simon is being seen at the request of Francis Flores for gi bleed, A Fib with RCR. Hx of EtOH cirrhosis    History of Present Illness:  79year old  male with PMH significant for chronic hepatitis C, chronic alcohol abuse, cirrhosis, acute MI, and COPD. No prior abdominal surgeries noted. Patient presented to the ER last night with complaints of melena and coffee ground emesis 2 days ago. Associated with lightheadedness, generalized abdominal pain and dizziness causing falls. Reports last episode of coffee-ground emesis post 2 days ago and last bowel movement was yesterday. Denies fever, chills, unintentional weight loss, constipation, diarrhea, or hematochezia. Labs reviewed with Hgb 7.8 on admission, down to 5.8 and hematocrit 18.6 this morning. Baseline Hgb ~ 9. BUN 35, creatinine 0.7. Low platelets 55. LFTs noted for low albumin 3.1, elevated AST 59, normal ALT 22, elevated total bilirubin 6.2. GI History:  2020 EGD with Dr. Sabine Morgan  Findings:  -Mid lower esophagus had no discernible varices Z line at 43 cm. Retroflexed view in the stomach revealed no gastric varices only minimal portal gastropathy. In the prepyloric area there were several erosions and we did obtain biopsies for H. pylori testing. The duodenum was unremarkable    2020 EGD with Dr. Sabine Morgan  Findings:  - In the lower esophagus there were a remaining varices Z line primarily 3 and 6:00. Other very small varices in the remainder of the lower esophagus. retroflexed view in the stomach revealed only mild portal gastropathy no gastric varices. The remainder of the stomach and duodenum unremarkable.  We then placed a total of 5 bands on remaining varices and lower esophagus    7/24/2020 EGD with Dr. Brendan Torres  Findings:  -Lower esophagus had 1 band placed where there was active bleeding 2 days ago site was healing. Additional bands present in the lower esophagus but did not see additional varices requiring more banding at this time. Z line 41 cm. Retroflexed view in the stomach showed no gastric varices and the remainder of the stomach was also unremarkable duodenum unremarkable. 7/22/2020 EGD with Dr. Brendan Torres  Findings:  -There were varices starting at about 36 cm primarily varix at 6:00 with a red spot. Also varices had developed lower esophagus around the Z line there were large varices at 12 and 6:00. There is a large clot in the fundus but retroflexed view did reveal most of the fundus had no involvement with varices. Antrum and body of the stomach were unremarkable duodenum was examined into the third portion and was unremarkable. As the scope was being withdrawn there was active bleeding from the 6:00 varix at 36 cm. We then attached the banding apparatus reinserted the scope successfully placed a band over the bleeding varix at 36 cm subsequently banded above the Z line at 12 and 6:00. An additional band 6:00 was also placed below the leading site. Bleeding had stopped as the scope was then withdrawn    2/28/2020 EGD with Dr. Brendan Torres  Findings:  -Lots of green beans and other material above the banded area in the midesophagus had to be removed with a basket and then with a suction cap Using multiple passes. The remaining varices appeared to be well banded only 9:00 varix requiring an additional bands. Retroflex in the stomach revealed no gastric varices did get a good look there was mild portal gastropathy. Remainder duodenum were unremarkable. We then not passed the scope and  attached and placed 1 band on the 9:00 varix. 2/26/2020 EGD with Dr. Brendan Torres  Findings:  -Starting in the mid esophagus varices were seen .  Along the 3:00 wall of the largest varix at least grade 3 in size did not identify stigmata of bleeding. Also had grade 2 varices 12 6 and 9:00. Z line was at 42 cm. Retroflexed view in the stomach revealed revealed a clot partially obscuring the fundus but most of the fundus could be seen and there were no gastric varices. There was mild portal gastropathy. Antrum was unremarkable the duodenal bulb was entirely normal second portion of duodenum there were 2 small erosions not bleeding. We then attached the banding apparatus reinserted the scope wasted total of 6 bands on varices and lower esophagus with most attention to the 3:00 varix    1/18/2018 EGD with Dr. Vilma Pandya  Findings:   -probable portal gastropathy, rule out h pylori  -irregular GE junction, esophagitis vs. Moreno's    11/17/2017 Colonoscopy with Dr. William Robertson  Findings:  - multiple (15) colon polyps diffusely throughout the colon  - sigmoid diverticulitis  - internal hemorrhoids    Past Medical History:  Past Medical History:   Diagnosis Date    COPD (chronic obstructive pulmonary disease) (HCC)     Hepatitis C antibody positive in blood 11/16/2017    History of blood transfusion     MI (myocardial infarction) [facility])         Past Surgical History:  Past Surgical History:   Procedure Laterality Date    CARDIAC CATHETERIZATION      heart cath    COLONOSCOPY  11/17/2017    Initial Colonoscopy; Multiple Colon Polyps    ENDOSCOPY, COLON, DIAGNOSTIC      EYE SURGERY      cataract removal    FL NJX DX/THER SBST INTRLMNR CRV/THRC W/IMG GDN Right 8/13/2018    RIGHT CERVICAL SEVEN THORACIC ONE EPIDURAL STEROID INJECTION SITE CONFIRMED BY FLUOROSCOPY performed by Bela Suazo MD at [address]  01/18/2018    Gastritis, Irreg GE Junction        Historical Medications:  Prior to Visit Medications    Medication Sig Taking? Authorizing Provider   oxyCODONE-acetaminophen (PERCOCET) 5-325 MG per tablet Take 1 tablet by mouth every 12 hours as needed.   Historical Provider, MD   pantoprazole (PROTONIX) 40 MG tablet TAKE 1 TABLET BY MOUTH IN THE MORNING BEFORE BREAKFAST  Tawnya Kothari MD   albuterol-ipratropium (COMBIVENT RESPIMAT)  MCG/ACT AERS inhaler Inhale 1 puff into the lungs every 6 hours as needed for Wheezing  Patient not taking: No sig reported  Tawnya Kothari MD        Hospital Medications:  Current Facility-Administered Medications: ondansetron (ZOFRAN-ODT) disintegrating tablet 4 mg, 4 mg, Oral, Q8H PRN **OR** ondansetron (ZOFRAN) injection 4 mg, 4 mg, IntraVENous, Q6H PRN  therapeutic multivitamin-minerals 1 tablet, 1 tablet, Oral, Daily  thiamine tablet 100 mg, 100 mg, Oral, Daily  folic acid (FOLVITE) tablet 1 mg, 1 mg, Oral, Daily  oxyCODONE (ROXICODONE) immediate release tablet 5 mg, 5 mg, Oral, Q12H PRN  0.9 % sodium chloride infusion, , IntraVENous, Continuous  sodium chloride flush 0.9 % injection 5-40 mL, 5-40 mL, IntraVENous, 2 times per day  sodium chloride flush 0.9 % injection 5-40 mL, 5-40 mL, IntraVENous, PRN  0.9 % sodium chloride infusion, , IntraVENous, PRN  nicotine (NICODERM CQ) 21 MG/24HR 1 patch, 1 patch, TransDERmal, Daily  pantoprazole (PROTONIX) 40 mg in sodium chloride (PF) 10 mL injection, 40 mg, IntraVENous, Q12H  octreotide (SANDOSTATIN) 500 mcg in sodium chloride 0.9 % 100 mL infusion, 25 mcg/hr, IntraVENous, Continuous  albuterol-ipratropium (COMBIVENT RESPIMAT)  MCG/ACT inhaler 1 puff, 1 puff, Inhalation, Q4H PRN  magnesium sulfate 1000 mg in dextrose 5% 100 mL IVPB, 1,000 mg, IntraVENous, PRN  potassium chloride 10 mEq/100 mL IVPB (Peripheral Line), 10 mEq, IntraVENous, PRN  0.9 % sodium chloride infusion, , IntraVENous, PRN  dilTIAZem 125 mg in dextrose 5 % 125 mL infusion, 2.5-15 mg/hr, IntraVENous, Continuous     Social History:   Social History       Tobacco History       Smoking Status  Every Day Smoking Frequency  1 pack/day for 50.00 years (50.00 pk-yrs) Smoking Tobacco Type  Cigarettes Smokeless Tobacco Use  Never      Tobacco Comments  refused, is seeing primary physician. Alcohol History       Alcohol Use Status  Not Currently              Drug Use       Drug Use Status  No              Sexual Activity       Sexually Active  Not Asked                     Family History:  Family History   Problem Relation Age of Onset    Other Mother     Cancer Mother     High Blood Pressure Father         Allergies: Allergies   Allergen Reactions    Codeine Hives        ROS:   General: No fever or weight change  Hematologic: No unexpected submucosal bleeding or bruising  HEENT: No sore throat or facial pain  Respiratory: No cough or dyspnea  Cardiovascular: No angina or dependent edema  Gastrointestinal: See HPI  Musculoskeletal: No usual joint pain or stiffness  Skin: No skin eruptions or changing lesions  Neurologic: No focal weakness or numbness  Psychiatric: No anxiety or sleep disturbance    Physical Exam:  Vital Signs:   Vitals:    10/11/22 0900   BP: (!) 105/55   Pulse: 90   Resp: 17   Temp: 97.7 °F (36.5 °C)   SpO2: 98%       General: Well-nourished, well-developed  HEENT: Sclera anicteric, mucosal membranes moist  Cardiovascular: Regular rate and rhythm. No murmurs. Respiratory: Respirations nonlabored, no crepitus  GI: Abdomen nondistended, soft, and nontender. Normal active bowel sounds. No masses palpable  Rectal: Deferred  Musculoskeletal: No pitting edema of the lower legs. Neurological: Gross memory appears intact. Patient is alert and oriented      Recent Imaging:   XR CHEST PORTABLE  Narrative: EXAM:    XR Chest, 1 View    EXAM DATE/TIME:    10/10/2022 4:25 pm    CLINICAL HISTORY:    ORDERING SYSTEM PROVIDED  tachycardia, dizzy, fall  TECHNOLOGIST PROVIDED  HISTORY:  Reason for exam:->tachycardia, dizzy, fall  Reason for Exam:  tachycardia, dizzy, fall    TECHNIQUE:    Frontal view of the chest.    COMPARISON:    09/18/2021    FINDINGS:    Lungs:  No acute findings.   Small calcified granuloma in the lower lateral  left lung field. Pleural space:  No acute findings. No pneumothorax. Heart:  No acute findings. No cardiomegaly. Mediastinum:  No acute findings. Bones/joints:  Right AC joint is widened and an AC joint injury/separation is  not excluded though this may be nonacute, correlate clinically. Otherwise no  evidence of fracture. Impression: 1. Right AC joint is widened and an AC joint injury/separation is not  excluded though this may be nonacute, correlate clinically. Otherwise no  evidence of fracture. 2.  No acute cardiopulmonary disease noted. XR RADIUS ULNA RIGHT (2 VIEWS)  Narrative: EXAM:    XR Right Forearm, 2 Views    EXAM DATE/TIME:    10/10/2022 4:25 pm    CLINICAL HISTORY:    ORDERING SYSTEM PROVIDED  fall  TECHNOLOGIST PROVIDED HISTORY:  Reason for  exam:->fall  Reason for Exam: fell    TECHNIQUE:    Frontal and lateral views of the right forearm. COMPARISON:    No relevant prior studies available. FINDINGS:    Bones/joints:  No acute findings. No acute fracture. No dislocation. Soft tissues:  Radiolucency and some soft tissue irregularity involving the  anteromedial forearm which may just be due to a bandage, though a soft tissue  injury is not excluded. Impression: Radiolucency and some soft tissue irregularity involving the anteromedial  forearm which may just be due to a bandage, though a soft tissue injury is  not excluded. No evidence of acute fracture. CT HEAD WO CONTRAST  Narrative: EXAM:    CT Head Without Intravenous Contrast    EXAM DATE/TIME:    10/10/2022 4:30 pm    CLINICAL HISTORY:    ORDERING SYSTEM PROVIDED  fall  TECHNOLOGIST PROVIDED HISTORY:  Reason for  exam:->fall  Has a \"code stroke\" or \"stroke alert\" been called? ->No  Decision  Support Exception - unselect if not a suspected or confirmed emergency  medical condition->Emergency Medical Condition (MA)  Reason for Exam: pt  falling    TECHNIQUE:    Axial computed tomography images of the head/brain without intravenous  contrast.  This CT exam was performed using one or more of the following dose  reduction techniques:  automated exposure control, adjustment of the mA  and/or kV according to patient size, and/or use of iterative reconstruction  technique. COMPARISON:    07/08/2018    FINDINGS:    Brain:  No acute ischemic change or other acute intracranial abnormality is  noted. No evidence of intracranial hemorrhage. Prominence of the sulci  and/or CSF spaces suggests a degree of cerebral atrophy. Ventricles:  No acute findings. No ventriculomegaly. Bones/joints:  No acute findings. No acute fracture. Soft tissues:  No acute findings. Sinuses:  Right more so than left frontal and minimal bilateral maxillary  chronic sinusitis. Mastoid air cells:  Unremarkable as visualized. No mastoid effusion. Impression: 1. No acute ischemic change or other acute intracranial abnormality is  noted. No evidence of intracranial hemorrhage. 2.  Prominence of the sulci and/or CSF spaces suggests a degree of cerebral  atrophy. 3.  Right more so than left frontal and minimal bilateral maxillary chronic  sinusitis. Labs:   Recent Labs     10/10/22  1612 10/11/22  0446 10/11/22  0810   HGB 7.8* 6.0* 5.8*   WBC 6.8 5.0  --    PROT 7.4 6.3*  --    LABALBU 3.7 3.1*  --    ALKPHOS 102 83  --    ALT 28 22  --    AST 60* 59*  --    BILITOT 6.6* 6.2*  --         Assessment:    79year old M with PMH significant for chronic hepatitis C and Alcohol cirrhosis, upper GI bleed from esophageal varices, Acute MI, and COPD presents with coffee ground emesis and melena. Impression:   Melenic stools; differential diagnosis includes but not limited to esophagitis, peptic ulcer disease, gastritis, neoplasia, arterio-venous malformations, Dieulafoy lesion, Reyes's lesion, varices, portal gastropathy, and Leni Acharya Tear.    Alcoholic hepatitis in setting of ETOH/HCV cirrhosis - MELD-Na 23, MDF 60.5      Plan:   Currently receiving 1 unit PRBC. To complete 2 units PRBC transfusion. Keep NPO with IV fluids. Monitor and transfuse to keep Hgb > 7 g/dL; avoiding overtransfusion   Continue Protonix 40 mg IV BID  Continue octreotide gtt. Monitor PT/INR and correct coagulopathy  Plan for EGD with bleeding control. Discontinue ASA and NSAIDs  Check HCV RNA. GI to follow. [facility]    [phone].  Also available via [employer]

## 2022-10-11 NOTE — CONSULTS
[facility]  [phone]        Reason for Consultation/Chief Complaint: \"I have been having coffee ground like emesis and black stools. \"  Consulted for AFIB with RVR with GI Bleed  No known cardiologist    History of Present Illness:  Moses Mancuso is a 79 y.o. patient who presented to [facility] 10/10/2022 with complaints of coffee ground emesis, N/V, melena, epigastric pain, falls, lightheadedness. He has PMH significant for ETOH cirrhosis, COPD, Hep C, and remote MI. Most recent Cath 4/5/2016 noted NOCAD (LM/LAD/Cx: <20%, RCA 20-30%, LVEF 55-60%). Most recent Lei-Myoview 4/17/2021 was negative with an EF=56%. Most Recent Echo 9/21/2021 Ectopy throughout the study; EF=50-55%; Grade I DD with normal filling pressure; aortic root is mildly dilated at 3.9 cm; right atrium is mildly dilated; Mild MR/TR;/OH AV sclerosis;(SPAP) estimated at 21 mmHg (RA pressure 8 mmHg);  ECHO 7-9-2018 EF=55-60%, DD1, AV sclerosis, negative bubble study  Now presents with  c/o N/V for couple days and had fallen the day before from being lightheaded. Reports coffee ground emesis and dark black stool since weekend. CXR noted no acute cardiopulmonary disease. EKG noted Narrow QRS; ST with PAC's vs atrial tachycardia; left axis deviation; septal infarct-age undetermined;  bpm. Second and third EKG's demonstrate MAT. LABS: , K 3.5, H/H=5.9/18.6;  BUN/Cr 36/1.0, Anion Gap 28, Mag 1.30, Lactic Acid 4.8, ALT 28, AST 60, Bilirubin 6.6 and ASHELY <0.01. Receiving 1U PRBC. Patient with no complaints of chest pain, SOB, palpitations, dizziness, edema, or orthopnea/PND. I have been asked to provide consultation regarding further management and testing. Past Medical History:   has a past medical history of COPD (chronic obstructive pulmonary disease) (HCC), Hepatitis C antibody positive in blood, History of blood transfusion, and MI (myocardial infarction) ([facility] [address]     Surgical History:   has a past surgical history that includes Colonoscopy (11/17/2017); Cardiac catheterization; Upper gastrointestinal endoscopy (01/18/2018); pr njx dx/ther sbst intrlmnr crv/thrc w/img gdn (Right, 8/13/2018); Endoscopy, colon, diagnostic; and eye surgery. Social History:   reports that he has been smoking cigarettes. He has a 50.00 pack-year smoking history. He has never used smokeless tobacco. Admits to heavy drinking ETOH prior but vague now. Drinks beer and it varies according to him. He reports that he does not use drugs. Family History:  family history includes Cancer in his mother; High Blood Pressure in his father; Other in his mother. Home Medications:  Were reviewed and are listed in nursing record. and/or listed below  Prior to Admission medications    Medication Sig Start Date End Date Taking? Authorizing Provider   oxyCODONE-acetaminophen (PERCOCET) 5-325 MG per tablet Take 1 tablet by mouth every 12 hours as needed.  8/31/22   Historical Provider, MD   pantoprazole (PROTONIX) 40 MG tablet TAKE 1 TABLET BY MOUTH IN THE MORNING BEFORE BREAKFAST 2/23/22   Ai Childress MD   albuterol-ipratropium (COMBIVENT RESPIMAT)  MCG/ACT AERS inhaler Inhale 1 puff into the lungs every 6 hours as needed for Wheezing  Patient not taking: No sig reported 9/21/21   Ai Childress MD        Allergies:  Codeine     Review of Systems:   12 point ROS negative in all areas as listed below except as in [place]  Constitutional, EENT, Cardiovascular, pulmonary, GI, , Musculoskeletal, skin, neurological, hematological, endocrine, Psychiatric    Physical Examination:    Vitals:    10/11/22 0357   BP:    Pulse:    Resp: 16   Temp:    SpO2:     Weight: 143 lb 7 oz (65.1 kg)         General Appearance:  Alert, cooperative, no distress, appears stated age   Head:  Normocephalic, without obvious abnormality, atraumatic   Eyes:  PERRL, conjunctiva/corneas clear       Nose: Nares normal, no drainage or sinus tenderness Throat: Lips, mucosa, and tongue normal   Neck: Supple, symmetrical, trachea midline, no adenopathy, thyroid: not enlarged, symmetric, no tenderness/mass/nodules, no carotid bruit or JVD       Lungs:   Clear to auscultation bilaterally, respirations unlabored   Chest Wall:  No tenderness or deformity   Heart:  Irregular;  S1, S2 normal, no murmur, rub or gallop   Abdomen:   Soft, non-tender, bowel sounds active all four quadrants,  no masses, no organomegaly           Extremities: Extremities normal, atraumatic, no cyanosis or edema   Pulses: 2+ and symmetric   Skin: Skin color, texture, turgor normal, no rashes or lesions   Pysch: Normal mood and affect   Neurologic: Normal gross motor and sensory exam.         Labs  CBC:   Lab Results   Component Value Date/Time    WBC 5.0 10/11/2022 04:46 AM    RBC 2.30 10/11/2022 04:46 AM    HGB 6.0 10/11/2022 04:46 AM    HCT 19.3 10/11/2022 04:46 AM    MCV 84.0 10/11/2022 04:46 AM    RDW 22.1 10/11/2022 04:46 AM    PLT 55 10/11/2022 04:46 AM     CMP:    Lab Results   Component Value Date/Time     10/11/2022 04:46 AM    K 3.0 10/11/2022 04:46 AM     10/11/2022 04:46 AM    CO2 29 10/11/2022 04:46 AM    BUN 35 10/11/2022 04:46 AM    CREATININE 0.7 10/11/2022 04:46 AM    GFRAA >60 10/11/2022 04:46 AM    GFRAA >60 04/02/2010 07:25 AM    AGRATIO 1.0 10/11/2022 04:46 AM    LABGLOM >60 10/11/2022 04:46 AM    GLUCOSE 102 10/11/2022 04:46 AM    PROT 6.3 10/11/2022 04:46 AM    PROT 8.0 04/02/2010 07:25 AM    CALCIUM 8.0 10/11/2022 04:46 AM    BILITOT 6.2 10/11/2022 04:46 AM    ALKPHOS 83 10/11/2022 04:46 AM    AST 59 10/11/2022 04:46 AM    ALT 22 10/11/2022 04:46 AM     PT/INR:  No results found for: PTINR  Lab Results   Component Value Date    TROPONINI <0.01 10/10/2022       EKG:  I have reviewed EKG with the following interpretation:  Impression:  See HPI    Assessment:  Karla Keller is a 79 y.o. patient who presented to [facility] 10/10/2022 with complaints of coffee ground emesis, N/V, melena, epigastric pain, falls, lightheadedness. He has PMH significant for ETOH cirrhosis, COPD, Hep C, and remote MI. Most recent Cath 4/5/2016 noted NOCAD (LM/LAD/Cx: <20%, RCA 20-30%, LVEF 55-60%). Most recent Lei-Myoview 4/17/2021 was negative with an EF=56%. Most Recent Echo 9/21/2021 Ectopy throughout the study; EF=50-55%; Grade I DD with normal filling pressure; aortic root is mildly dilated at 3.9 cm; right atrium is mildly dilated; Mild MR/TR;/WA AV sclerosis;(SPAP) estimated at 21 mmHg (RA pressure 8 mmHg);  ECHO 7-9-2018 EF=55-60%, DD1, AV sclerosis, negative bubble study  Now presents with  c/o N/V for couple days and had fallen the day before from being lightheaded. Reports coffee ground emesis and dark black stool since weekend. CXR noted no acute cardiopulmonary disease. EKG noted Narrow QRS; ST with PAC's vs atrial tachycardia; left axis deviation; septal infarct-age undetermined;  bpm. Second and third EKG's demonstrate MAT. LABS: , K 3.5, H/H=5.9/18.6;  BUN/Cr 36/1.0, Anion Gap 28, Mag 1.30, Lactic Acid 4.8, ALT 28, AST 60, Bilirubin 6.6 and ASHELY <0.01. Receiving 1U PRBC. Diagnosis of multifocal atrial tachycardia in older male with UGIB/severe anemia/electrolyte abnls and hx cirrhosis, heavy ETOH, and NOCAD. Recs:   EKG's and tele reviewed and NOT atrial fibrillation  Rhythm is MAT and likely exacerbated by underlying GIB med condition. Transfuse with PRBC per IM/GI docs  Start po diltiazem 30mg po q 6 hours and stop dilt gtt. Adjust medication to help HR. No need for cardiac testing at this time. If cannot take po meds then continue gtt. Replete lytes appropriately. No further cardiac recs or testing needed. Signing off. Call if further questions.      Patient Active Problem List   Diagnosis    Open wound of finger with complication    Chronic dermatitis    Chest pain    Unstable angina (HCC)    Alcohol abuse    Hypokalemia    Thrombocytopenia (HCC) Macrocytosis    Elevated transaminase level    Coronary artery disease involving native coronary artery of native heart without angina pectoris    Elevated LFTs    Hyperkalemia    Rectal mass    Proctitis    Right sided weakness    Cervical disc disease with myelopathy    Numbness    Foraminal stenosis of cervical region    Abnormal EKG    Alcoholic cirrhosis of liver without ascites (HCC)    Pancytopenia (HCC)    Hyponatremia    Smoker    Weakness    Multiple falls    History of hepatitis C    History of cirrhosis    Right hip pain    Coffee ground emesis        Thank you for allowing to us to participate in the care or Puneet Rivera. Further evaluation will be based upon the patient's clinical course and testing results.

## 2022-10-11 NOTE — FLOWSHEET NOTE
10/11/22 1151   Vitals   /65   Temp 98.2 °F (36.8 °C)   Temp Source Oral   Heart Rate 90   Resp 19   SpO2 97 %   Level of Consciousness 0   MEWS Score 2   Transfuse RBC   Volume 327.08    First unit of PRBC completed. No signs or symptoms of transfusion reaction. Pt stable.     Wash RADHA Vasquez

## 2022-10-12 LAB
ALBUMIN SERPL-MCNC: 3.8 G/DL (ref 3.4–5)
ALP BLD-CCNC: 95 U/L (ref 40–129)
ALT SERPL-CCNC: 29 U/L (ref 10–40)
ANION GAP SERPL CALCULATED.3IONS-SCNC: 12 MMOL/L (ref 3–16)
AST SERPL-CCNC: 69 U/L (ref 15–37)
BILIRUB SERPL-MCNC: 5.5 MG/DL (ref 0–1)
BILIRUBIN DIRECT: 3.3 MG/DL (ref 0–0.3)
BILIRUBIN, INDIRECT: 2.2 MG/DL (ref 0–1)
BUN BLDV-MCNC: 20 MG/DL (ref 7–20)
CALCIUM SERPL-MCNC: 8.3 MG/DL (ref 8.3–10.6)
CHLORIDE BLD-SCNC: 100 MMOL/L (ref 99–110)
CO2: 23 MMOL/L (ref 21–32)
CREAT SERPL-MCNC: 0.6 MG/DL (ref 0.8–1.3)
GFR AFRICAN AMERICAN: >60
GFR NON-AFRICAN AMERICAN: >60
GLUCOSE BLD-MCNC: 92 MG/DL (ref 70–99)
HCT VFR BLD CALC: 22.4 % (ref 40.5–52.5)
HCT VFR BLD CALC: 23.2 % (ref 40.5–52.5)
HCT VFR BLD CALC: 26.4 % (ref 40.5–52.5)
HEMOGLOBIN: 7.3 G/DL (ref 13.5–17.5)
HEMOGLOBIN: 7.5 G/DL (ref 13.5–17.5)
HEMOGLOBIN: 8.6 G/DL (ref 13.5–17.5)
INR BLD: 1.82 (ref 0.87–1.14)
POTASSIUM SERPL-SCNC: 3 MMOL/L (ref 3.5–5.1)
PROTHROMBIN TIME: 20.9 SEC (ref 11.7–14.5)
SODIUM BLD-SCNC: 135 MMOL/L (ref 136–145)
TOTAL PROTEIN: 7.5 G/DL (ref 6.4–8.2)

## 2022-10-12 PROCEDURE — 99233 SBSQ HOSP IP/OBS HIGH 50: CPT | Performed by: INTERNAL MEDICINE

## 2022-10-12 PROCEDURE — 6360000002 HC RX W HCPCS: Performed by: INTERNAL MEDICINE

## 2022-10-12 PROCEDURE — 6370000000 HC RX 637 (ALT 250 FOR IP): Performed by: INTERNAL MEDICINE

## 2022-10-12 PROCEDURE — 80048 BASIC METABOLIC PNL TOTAL CA: CPT

## 2022-10-12 PROCEDURE — A4216 STERILE WATER/SALINE, 10 ML: HCPCS | Performed by: INTERNAL MEDICINE

## 2022-10-12 PROCEDURE — C9113 INJ PANTOPRAZOLE SODIUM, VIA: HCPCS | Performed by: INTERNAL MEDICINE

## 2022-10-12 PROCEDURE — 2580000003 HC RX 258: Performed by: INTERNAL MEDICINE

## 2022-10-12 PROCEDURE — 85018 HEMOGLOBIN: CPT

## 2022-10-12 PROCEDURE — 80076 HEPATIC FUNCTION PANEL: CPT

## 2022-10-12 PROCEDURE — 2060000000 HC ICU INTERMEDIATE R&B

## 2022-10-12 PROCEDURE — 2580000003 HC RX 258: Performed by: ANESTHESIOLOGY

## 2022-10-12 PROCEDURE — 85014 HEMATOCRIT: CPT

## 2022-10-12 PROCEDURE — 85610 PROTHROMBIN TIME: CPT

## 2022-10-12 PROCEDURE — 94761 N-INVAS EAR/PLS OXIMETRY MLT: CPT

## 2022-10-12 PROCEDURE — 2700000000 HC OXYGEN THERAPY PER DAY

## 2022-10-12 PROCEDURE — 87522 HEPATITIS C REVRS TRNSCRPJ: CPT

## 2022-10-12 PROCEDURE — 36415 COLL VENOUS BLD VENIPUNCTURE: CPT

## 2022-10-12 RX ORDER — LACTULOSE 10 G/15ML
20 SOLUTION ORAL 2 TIMES DAILY
Status: DISCONTINUED | OUTPATIENT
Start: 2022-10-12 | End: 2022-10-13 | Stop reason: HOSPADM

## 2022-10-12 RX ADMIN — Medication 10 ML: at 09:02

## 2022-10-12 RX ADMIN — LACTULOSE 20 G: 10 SOLUTION ORAL at 22:13

## 2022-10-12 RX ADMIN — DILTIAZEM HYDROCHLORIDE 30 MG: 60 TABLET, FILM COATED ORAL at 06:08

## 2022-10-12 RX ADMIN — CEFTRIAXONE SODIUM 1000 MG: 1 INJECTION, POWDER, FOR SOLUTION INTRAMUSCULAR; INTRAVENOUS at 17:32

## 2022-10-12 RX ADMIN — OXYCODONE 5 MG: 5 TABLET ORAL at 00:58

## 2022-10-12 RX ADMIN — DILTIAZEM HYDROCHLORIDE 30 MG: 60 TABLET, FILM COATED ORAL at 00:55

## 2022-10-12 RX ADMIN — SODIUM CHLORIDE: 9 INJECTION, SOLUTION INTRAVENOUS at 02:56

## 2022-10-12 RX ADMIN — DILTIAZEM HYDROCHLORIDE 30 MG: 60 TABLET, FILM COATED ORAL at 11:23

## 2022-10-12 RX ADMIN — LACTULOSE 10 G: 10 SOLUTION ORAL at 14:50

## 2022-10-12 RX ADMIN — Medication 10 ML: at 22:13

## 2022-10-12 RX ADMIN — OXYCODONE 5 MG: 5 TABLET ORAL at 14:54

## 2022-10-12 RX ADMIN — SODIUM CHLORIDE, PRESERVATIVE FREE 10 ML: 5 INJECTION INTRAVENOUS at 22:13

## 2022-10-12 RX ADMIN — Medication 40 MG: at 14:51

## 2022-10-12 RX ADMIN — MULTIPLE VITAMINS W/ MINERALS TAB 1 TABLET: TAB at 09:02

## 2022-10-12 RX ADMIN — DILTIAZEM HYDROCHLORIDE 30 MG: 60 TABLET, FILM COATED ORAL at 17:30

## 2022-10-12 RX ADMIN — SODIUM CHLORIDE, PRESERVATIVE FREE 10 ML: 5 INJECTION INTRAVENOUS at 09:02

## 2022-10-12 RX ADMIN — Medication 40 MG: at 01:03

## 2022-10-12 RX ADMIN — POTASSIUM CHLORIDE 10 MEQ: 7.46 INJECTION, SOLUTION INTRAVENOUS at 22:40

## 2022-10-12 RX ADMIN — Medication 100 MG: at 09:02

## 2022-10-12 RX ADMIN — FOLIC ACID 1 MG: 1 TABLET ORAL at 09:02

## 2022-10-12 ASSESSMENT — PAIN DESCRIPTION - ORIENTATION
ORIENTATION: RIGHT;LEFT;MID
ORIENTATION: RIGHT;LOWER

## 2022-10-12 ASSESSMENT — PAIN DESCRIPTION - DESCRIPTORS
DESCRIPTORS: ACHING;SORE
DESCRIPTORS: ACHING;THROBBING

## 2022-10-12 ASSESSMENT — PAIN SCALES - GENERAL
PAINLEVEL_OUTOF10: 8
PAINLEVEL_OUTOF10: 6
PAINLEVEL_OUTOF10: 9

## 2022-10-12 ASSESSMENT — PAIN SCALES - WONG BAKER: WONGBAKER_NUMERICALRESPONSE: 0

## 2022-10-12 ASSESSMENT — PAIN DESCRIPTION - LOCATION
LOCATION: SHOULDER;NECK
LOCATION: BACK;SHOULDER

## 2022-10-12 NOTE — CARE COORDINATION
Case Management Assessment  Initial Evaluation      Patient Name: Moses Mancuso  YOB: 1955  Diagnosis: Hypomagnesemia [E83.42]  Coffee ground emesis [K92.0]  Atrial fibrillation with rapid ventricular response ([address] [I48.91]  Gastrointestinal hemorrhage, unspecified gastrointestinal hemorrhage type [K92.2]  Date / Time: 10/10/2022  3:49 PM    Admission status/Date:  10/10/2022  Chart Reviewed: Yes      Patient Interviewed: Yes   Family Interviewed:  No      Hospitalization in the last 30 days:  No      Health Care Decision Maker :   Primary Decision Maker: Una Gilliland Spouse - [phone]    Secondary Decision Maker: Trupti Janes  Child - [phone]    (CM - must 1st enter selection under Navigator - emergency contact- Health Care Decision Maker Relationship and pick relationship)   Who do you trust or have selected to make healthcare decisions for you    Current PCP: Pedro Tyson, [address] required for SNF : YES      3 night stay required - NA    ADLS  Support Systems/Care Needs: Spouse/Significant Other, Children  Transportation:  spouse     Meal Preparation: spouse    Housing  Living Arrangements: Lives w/spouse in single story house  Steps: one  Intent for return to present living arrangements: Yes  Identified Issues: NA    [address] with [address] : No Agency:(Services)     Passport/Waiver : No  :                      Phone Number:    Passport/Waiver Services: NA          Durable Medical Equiptment   DME Provider: [employer]  Equipment:   Walker___Cane_X__RTS___ BSC___Shower Chair___Hospital Bed___W/C____Other________  02 at ____Liter(s)---wears(frequency)_______ HHN ___ CPAP___ BiPap___   N/A____      Home O2 Use :  No      Informed of need for care provider to bring portable home O2 tank on day of discharge for nursing to connect prior to leaving:   Not Indicated  Verbalized agreement/Understanding:   Not Indicated    Community Service Affiliation  Dialysis:  No    Agency:  Location:  Dialysis Schedule:  Phone:   Fax: Other Community Services: (ex:PT/OT,Mental Health,Wound Clinic, Cardio/Pul [address])    DISCHARGE PLAN: Explained Case Management role/services. Chart reviewed. Met with pt at bedside and explained the role of the CM. SW consult for ETOH rehab option noted. Pt declines. Lives w/ spouse and plans to return home. He is independent with all ADLS. Spouse can assist PRN. HE is interested [facility] [address] [facility] PT/OT. He does not have a preference on [address] provider. Freedom of choice list offered. Referral made to [facility]. +CM following.

## 2022-10-12 NOTE — PROGRESS NOTES
Patient resting in bed, VSS. No acute distress noted. Ice chips provided. Call light in reach. Will continue to monitor.

## 2022-10-12 NOTE — PROGRESS NOTES
Progress Note    Admit Date:  10/10/2022    Pt presented to the ED with complaints of melena and coffee-ground emesis since Sunday. Pt noted to be anemic. Afib with RVR vs sinus tachycardia. Cardiology  and GI consulted. Subjective:  Mr. Carlos Harper is resting in bed. On oxygen 2 liters. Stated he is having some abdominal pain. No further coffee-ground emesis. Pt denies any recent alcohol use. Objective:   Patient Vitals for the past 4 hrs:   BP   10/12/22 0608 113/65            Intake/Output Summary (Last 24 hours) at 10/12/2022 0848  Last data filed at 10/12/2022 0610  Gross per 24 hour   Intake 560.41 ml   Output 850 ml   Net -289.59 ml         Physical Exam:    Gen: No distress. Alert. Appears chronically ill and pale   Eyes: PERRL. No sclera icterus. No conjunctival injection. ENT: No discharge. Pharynx clear. Neck: No JVD. Trachea midline. Resp: No accessory muscle use. No crackles. No wheezes. No rhonchi. CV: Regular rate. Regular rhythm. No murmur. No rub. No edema. Capillary Refill: Brisk,< 3 seconds   Peripheral Pulses: +2 palpable, equal bilaterally   GI: diffuse TTP, Non-distended. Normal bowel sounds. No ascites   Skin: Warm and dry. No nodule on exposed extremities. No rash on exposed extremities. M/S: No cyanosis. No joint deformity. No clubbing. Neuro: Awake. Grossly nonfocal    Psych: Oriented x 3. No anxiety or agitation. Antonio Welch MD have reviewed the chart on [address] [place] [address] and personally interviewed and examined patient, reviewed the data (labs and imaging) and after discussion with my PA formulated the plan. Agree with note with the following edits. Physical exam:    /65   Pulse 89   Temp 98.2 °F (36.8 °C) (Oral)   Resp 18   Ht 5' 8\" (1.727 m)   Wt 146 lb 7 oz (66.4 kg)   SpO2 96%   BMI 22.27 kg/m²     Gen: No distress. Alert. Eyes: PERRL. No sclera icterus. No conjunctival injection. ENT: No discharge. Pharynx clear.    Neck: Trachea midline. Normal thyroid. Resp: No accessory muscle use. No crackles. No wheezes. No rhonchi. No dullness on percussion. CV: Regular rate. Regular rhythm. No murmur or rub. No edema. GI: Non-tender. Non-distended. No masses. No organomegaly. Normal bowel sounds. No hernia. Skin: Warm and dry. No nodule on exposed extremities. No rash on exposed extremities. Lymph: No cervical LAD. No supraclavicular LAD. M/S: No cyanosis. No joint deformity. No clubbing. Neuro: Awake. Moves all 4 extremities, non focal  Psych: Oriented x 3. No anxiety or agitation. JUDY Aranda       Scheduled Meds:   multivitamin  1 tablet Oral Daily    thiamine  100 mg Oral Daily    folic acid  1 mg Oral Daily    sodium chloride flush  5-40 mL IntraVENous 2 times per day    nicotine  1 patch TransDERmal Daily    pantoprazole (PROTONIX) 40 mg injection  40 mg IntraVENous Q12H    dilTIAZem  30 mg Oral 4 times per day    sodium chloride flush  5-40 mL IntraVENous 2 times per day    cefTRIAXone (ROCEPHIN) IV  1,000 mg IntraVENous Q24H       Continuous Infusions:   sodium chloride 100 mL/hr at 10/12/22 0256    sodium chloride      sodium chloride      sodium chloride         PRN Meds:  ondansetron **OR** ondansetron, oxyCODONE, sodium chloride flush, sodium chloride, albuterol-ipratropium, magnesium sulfate, potassium chloride, sodium chloride, sodium chloride flush, sodium chloride      Data:  CBC:   Recent Labs     10/10/22  1612 10/11/22  0446 10/11/22  0810 10/11/22  1944 10/12/22  0217 10/12/22  0836   WBC 6.8 5.0  --   --   --   --    HGB 7.8* 6.0*   < > 7.7* 7.3* 7.5*   HCT 25.1* 19.3*   < > 24.6* 22.4* 23.2*   MCV 83.7 84.0  --   --   --   --    PLT 89* 55*  --   --   --   --     < > = values in this interval not displayed.        BMP:   Recent Labs     10/10/22  1612 10/11/22  0446    141   K 3.5 3.0*   CL 95* 102   CO2 19* 29   BUN 36* 35*   CREATININE 1.0 0.7*       LIVER PROFILE:   Recent Labs 10/10/22  1612 10/11/22  0446   AST 60* 59*   ALT 28 22   BILITOT 6.6* 6.2*   ALKPHOS 102 83       PT/INR:   Recent Labs     10/11/22  0446   PROTIME 24.8*   INR 2.26*         CULTURES  COVID/Influenza: not detected    Blood Cultures: pending      RADIOLOGY  CT HEAD WO CONTRAST   Final Result      1. No acute ischemic change or other acute intracranial abnormality is   noted. No evidence of intracranial hemorrhage. 2.  Prominence of the sulci and/or CSF spaces suggests a degree of cerebral   atrophy. 3.  Right more so than left frontal and minimal bilateral maxillary chronic   sinusitis. XR CHEST PORTABLE   Final Result      1. Right AC joint is widened and an AC joint injury/separation is not   excluded though this may be nonacute, correlate clinically. Otherwise no   evidence of fracture. 2.  No acute cardiopulmonary disease noted. XR RADIUS ULNA RIGHT (2 VIEWS)   Final Result      Radiolucency and some soft tissue irregularity involving the anteromedial   forearm which may just be due to a bandage, though a soft tissue injury is   not excluded. No evidence of acute fracture. Assessment/Plan:    GI bleed, acute blood loss anemia  Hx of alcohol cirrhosis- elevated liver enzymes   Hx of esophageal varies   - pt with +occult stool  - hemoglobin on admission 7.8--previously 9.7.  - 2 units of PRBC ordered this am for hemoglobin 5.8  - cont to monitor h/h, transfuse for Hb<7   - started on octreotide and protonix drip  - NPO  - GI plan for EGD today- per note, plan for erythromycin 250 mg IV once for promotility prior to EGD  - avoid ASA and NSAIDS  Esophagogastroduodenoscopy-no esophageal or gastric varices. Erosive esophagitis. Moderate portal hypertensive gastropathy. .  3 nonbleeding AV malformation seen in duodenal bulb  DC octreotide  H/H stable.   Rocephin for SBP prophylaxis    Thrombocytopenia  - 89--55  - likely 2/2 alcohol cirrhosis  - monitor    Sinus tachycardia vs Atrial fibrillation with RVR  - cardiology consulted  - rhythm noted with MAT likely exacerbated by GIB  - transfuse PRBC as needed  - started on diltiazem 30 mg every 6 hours, stop drip.  - adjust to control heart rate  - cardiology stated no need for further cardiac testing at this time  - replace electrolytes as needed. - monitor on telemetry      Lactic acidosis  - likely 2/2 above  - 4.8 on admission, trended down to 1.7  - continue IVF    Hypomagnesemia   - 1.3, replacement given  - 2.30 today       Hypokalemia  - 3.0 today  - 40 mEq given   - monitor     Tobacco Dependence  -Recommended cessation  - nicotine patch ordered     History of hepatitis C      DVT Prophylaxis: scds  Diet: ADULT DIET; Clear Liquid  Code Status: Full Code      KEIKO Aranda.

## 2022-10-12 NOTE — ACP (ADVANCE CARE PLANNING)
Advance Care Planning     General Advance Care Planning (ACP) Conversation    Date of Conversation: 10/10/2022  Conducted with: Patient with Decision Making Capacity    Healthcare Decision Maker:    Primary Decision Maker: Jose Meeks - Spouse - [phone]    Secondary Decision Maker: Thania Hernadez - Child - [phone]  Click here to complete Healthcare Decision Makers including selection of the Healthcare Decision Maker Relationship (ie \"Primary\"). Today we documented Decision Maker(s) consistent with Legal Next of Kin hierarchy.     Content/Action Overview:    Reviewed DNR/DNI and patient elects Full Code (Attempt Resuscitation)        Length of Voluntary ACP Conversation in minutes:  <16 minutes (Non-Billable)    Fran Tello RN

## 2022-10-12 NOTE — FLOWSHEET NOTE
10/12/22 0854   Vital Signs   Temp 97.6 °F (36.4 °C)   Temp Source Oral   Heart Rate 99   Heart Rate Source Monitor   Resp 18   /71   BP Location Left upper arm   MAP (Calculated) 83   Level of Consciousness 0   MEWS Score 1   Oxygen Therapy   SpO2 98 %   O2 Device None (Room air)   O2 Flow Rate (L/min) 2 L/min   Pt. Resting in bed. Call light in reach. Shift assessment completed see flow sheet. Denies any needs at this time. Will continue to monitor.

## 2022-10-12 NOTE — FLOWSHEET NOTE
Shift assessment completed. Patient awake in bed. A/Ox4. IV's flushed. VSS. Patient denies any pain or discomfort at this time. Call light and bedside table within reach.

## 2022-10-12 NOTE — FLOWSHEET NOTE
10/12/22 0104   Vital Signs   Temp 98.2 °F (36.8 °C)   Temp Source Oral   Heart Rate 89   Heart Rate Source Monitor   Resp 18   /60   BP Location Left upper arm   BP Method Automatic   MAP (Calculated) 75   Patient Position Sitting   Level of Consciousness 0   MEWS Score 1   Oxygen Therapy   SpO2 96 %   O2 Device Nasal cannula   O2 Flow Rate (L/min) 2 L/min   Height and Weight   Weight 146 lb 7 oz (66.4 kg)   Weight Method Standing scale   BMI (Calculated) 22.3   Patient awake in bed. A/Ox4. VSS. Patient c/o pain in neck and shoulders. Rates pain 9/10. Prn Oxycodone given. Patient denies any further needs at this time. Call light within reach.

## 2022-10-12 NOTE — PLAN OF CARE
Problem: Discharge Planning  Goal: Discharge to home or other facility with appropriate resources  10/12/2022 0855 by Daysi Herrmann RN  Outcome: Progressing  10/11/2022 2204 by Sergey Harrison RN  Outcome: Progressing     Problem: Pain  Goal: Verbalizes/displays adequate comfort level or baseline comfort level  10/12/2022 0855 by Daysi Herrmann RN  Outcome: Progressing  10/11/2022 2204 by Sergey Harrison RN  Outcome: Progressing     Problem: Safety - Adult  Goal: Free from fall injury  10/12/2022 0855 by Daysi Herrmann RN  Outcome: Progressing  10/11/2022 2204 by Sergey Harrison RN  Outcome: Progressing

## 2022-10-12 NOTE — PROGRESS NOTES
Progress Note    Patient Luis Carlos Luo  MRN: [id number]  YOB: 1955 Age: 79 y.o. Sex: male  Room: [place] [id number]       Admitting Physician: Attila Gastelum MD   Date of Admission: 10/10/2022  3:49 PM   Primary Care Physician: Carlos Mireles MD     Subjective:  Luis Carlos Luo was seen and examined. We are following for GI bleed, Alcohol/HCV cirrhosis. Patient reports feeling improved. No bowel movements overnight. ROS:  Constitutional: Denies fever, no change in appetite  Respiratory: Denies cough or shortness of breath  Cardiovascular: Denies chest pain or edema    Objective:  Vital Signs:   Vitals:    10/12/22 1310   BP: 115/71   Pulse: 97   Resp: 18   Temp: 98.4 °F (36.9 °C)   SpO2: 94%         Physical Exam:  Constitutional: Jaundiced. Alert and oriented x 4. No acute distress. Respiratory: Respirations nonlabored, no crepitus  GI: Abdomen nondistended, soft, and nontender. Neurological: No focal deficits noted. No asterixis.     Intake/Output:    Intake/Output Summary (Last 24 hours) at 10/12/2022 1416  Last data filed at 10/12/2022 0902  Gross per 24 hour   Intake 60 ml   Output 400 ml   Net -340 ml        Current Medications:  Current Facility-Administered Medications   Medication Dose Route Frequency Provider Last Rate Last Admin    ondansetron (ZOFRAN-ODT) disintegrating tablet 4 mg  4 mg Oral Q8H PRN Attila Gastelum MD        Or    ondansetron [facility]) injection 4 mg  4 mg IntraVENous Q6H PRN Attila Gastelum MD        therapeutic multivitamin-minerals 1 tablet  1 tablet Oral Daily Attila Gastelum MD   1 tablet at 10/12/22 0902    thiamine tablet 100 mg  100 mg Oral Daily Attila Gastelum MD   100 mg at 23/56/73 7973    folic acid (FOLVITE) tablet 1 mg  1 mg Oral Daily Attila Gastelum MD   1 mg at 10/12/22 4223    oxyCODONE (ROXICODONE) immediate release tablet 5 mg  5 mg Oral Q12H PRN Attila Gastelum MD   5 mg at 10/12/22 0058    sodium chloride flush 0.9 % injection 5-40 mL  5-40 mL IntraVENous 2 times per day Arben Sloan MD   10 mL at 10/12/22 0902    sodium chloride flush 0.9 % injection 5-40 mL  5-40 mL IntraVENous PRN Arben Sloan MD        0.9 % sodium chloride infusion   IntraVENous PRN Arben Sloan MD        nicotine (NICODERM CQ) 21 MG/24HR 1 patch  1 patch TransDERmal Daily Arben Sloan MD   1 patch at 10/12/22 0902    pantoprazole (PROTONIX) 40 mg in sodium chloride (PF) 10 mL injection  40 mg IntraVENous Q12H Arben Sloan MD   40 mg at 10/12/22 0103    albuterol-ipratropium (COMBIVENT RESPIMAT)  MCG/ACT inhaler 1 puff  1 puff Inhalation Q4H PRN Arben Sloan MD        magnesium sulfate 1000 mg in dextrose 5% 100 mL IVPB  1,000 mg IntraVENous PRN Arben Sloan MD        potassium chloride 10 mEq/100 mL IVPB (Peripheral Line)  10 mEq IntraVENous PRN Arben Sloan MD        0.9 % sodium chloride infusion   IntraVENous PRN Arben Sloan MD        dilTIAZem (CARDIZEM) tablet 30 mg  30 mg Oral 4 times per day Sheryl Zhu MD   30 mg at 10/12/22 1123    sodium chloride flush 0.9 % injection 5-40 mL  5-40 mL IntraVENous 2 times per day Leah Jesus MD   10 mL at 10/12/22 0902    sodium chloride flush 0.9 % injection 5-40 mL  5-40 mL IntraVENous PRN Leah Jesus MD        0.9 % sodium chloride infusion   IntraVENous PRN Leah Jesus MD        cefTRIAXone (ROCEPHIN) 1,000 mg in dextrose 5 % 50 mL IVPB mini-bag  1,000 mg IntraVENous Q24H Bia Rivera MD   Stopped at 10/11/22 1641         Recent Imaging:   XR CHEST PORTABLE  Narrative: EXAM:    XR Chest, 1 View    EXAM DATE/TIME:    10/10/2022 4:25 pm    CLINICAL HISTORY:    ORDERING SYSTEM PROVIDED  tachycardia, dizzy, fall  TECHNOLOGIST PROVIDED  HISTORY:  Reason for exam:->tachycardia, dizzy, fall  Reason for Exam:  tachycardia, dizzy, fall    TECHNIQUE:    Frontal view of the chest.    COMPARISON:    09/18/2021    FINDINGS:    Lungs:  No acute findings. Small calcified granuloma in the lower lateral  left lung field. Pleural space:  No acute findings. No pneumothorax. Heart:  No acute findings. No cardiomegaly. Mediastinum:  No acute findings. Bones/joints:  Right AC joint is widened and an AC joint injury/separation is  not excluded though this may be nonacute, correlate clinically. Otherwise no  evidence of fracture. Impression: 1. Right AC joint is widened and an AC joint injury/separation is not  excluded though this may be nonacute, correlate clinically. Otherwise no  evidence of fracture. 2.  No acute cardiopulmonary disease noted. XR RADIUS ULNA RIGHT (2 VIEWS)  Narrative: EXAM:    XR Right Forearm, 2 Views    EXAM DATE/TIME:    10/10/2022 4:25 pm    CLINICAL HISTORY:    ORDERING SYSTEM PROVIDED  fall  TECHNOLOGIST PROVIDED HISTORY:  Reason for  exam:->fall  Reason for Exam: fell    TECHNIQUE:    Frontal and lateral views of the right forearm. COMPARISON:    No relevant prior studies available. FINDINGS:    Bones/joints:  No acute findings. No acute fracture. No dislocation. Soft tissues:  Radiolucency and some soft tissue irregularity involving the  anteromedial forearm which may just be due to a bandage, though a soft tissue  injury is not excluded. Impression: Radiolucency and some soft tissue irregularity involving the anteromedial  forearm which may just be due to a bandage, though a soft tissue injury is  not excluded. No evidence of acute fracture. CT HEAD WO CONTRAST  Narrative: EXAM:    CT Head Without Intravenous Contrast    EXAM DATE/TIME:    10/10/2022 4:30 pm    CLINICAL HISTORY:    ORDERING SYSTEM PROVIDED  fall  TECHNOLOGIST PROVIDED HISTORY:  Reason for  exam:->fall  Has a \"code stroke\" or \"stroke alert\" been called? ->No  Decision  Support Exception - unselect if not a suspected or confirmed emergency  medical condition->Emergency Medical Condition (MA)  Reason for Exam: pt  falling    TECHNIQUE:    Axial computed tomography images of the head/brain without intravenous  contrast.  This CT exam was performed using one or more of the following dose  reduction techniques:  automated exposure control, adjustment of the mA  and/or kV according to patient size, and/or use of iterative reconstruction  technique. COMPARISON:    07/08/2018    FINDINGS:    Brain:  No acute ischemic change or other acute intracranial abnormality is  noted. No evidence of intracranial hemorrhage. Prominence of the sulci  and/or CSF spaces suggests a degree of cerebral atrophy. Ventricles:  No acute findings. No ventriculomegaly. Bones/joints:  No acute findings. No acute fracture. Soft tissues:  No acute findings. Sinuses:  Right more so than left frontal and minimal bilateral maxillary  chronic sinusitis. Mastoid air cells:  Unremarkable as visualized. No mastoid effusion. Impression: 1. No acute ischemic change or other acute intracranial abnormality is  noted. No evidence of intracranial hemorrhage. 2.  Prominence of the sulci and/or CSF spaces suggests a degree of cerebral  atrophy. 3.  Right more so than left frontal and minimal bilateral maxillary chronic  sinusitis.        Labs:   Recent Labs     10/10/22  1612 10/11/22  0446 10/11/22  0810 10/11/22  1411 10/11/22  1944 10/12/22  0217 10/12/22  0836   HGB 7.8* 6.0* 5.8* 8.1* 7.7* 7.3* 7.5*   WBC 6.8 5.0  --   --   --   --   --    PROT 7.4 6.3*  --   --   --   --   --    LABALBU 3.7 3.1*  --   --   --   --   --    ALKPHOS 102 83  --   --   --   --   --    ALT 28 22  --   --   --   --   --    AST 60* 59*  --   --   --   --   --    BILITOT 6.6* 6.2*  --   --   --   --   --           Assessment:    79year old M with PMH significant for chronic hepatitis C and Alcohol cirrhosis, upper GI bleed from esophageal varices, Acute MI, and COPD presents with coffee ground emesis and melena. Labs reviewed with Hgb 7.8 on admission, down to 5.8 and hematocrit 18.6 on 10/11. Baseline Hgb ~ 9. S/p 2 units PRBC on 10/11 with stable Hb 7.5 g/dL. He is s/p EGD on 10/11/22 with LA grade B erosive esophagitis and coagulation of 3 duodenal nonbleeding AVMs. 10/11 EGD: Normal upper and mid esophagus. No evidence of esophageal or gastric varices. LA grade B erosive esophagitis in the distal esophagus. Irregular Z-line at 40 cm from incisors. Moderate portal hypertensive gastropathy. Three 3 to 6 mm in nonbleeding arteriovenous malformation seen in the duodenal bulb and second portion of duodenum. Coagulation for bleeding prevention using argon plasma performed. Impression:   1. Melenic stools likely due to erosive esophagtis, moderate portal hypertensive gastropathy. 2. Alcoholic hepatitis in setting of ETOH/HCV cirrhosis - MELD-Na 23, MDF 60.5    Plan:   Hb stable at 7.5 g/dL. Monitor and transfuse to keep Hgb > 7 g/dL; avoiding overtransfusion   Continue Protonix 40 mg IV BID  Monitor PT/INR and correct coagulopathy  Continue Rocephin 1 gm IV daily for sepsis prophylaxis to complete 5 days  Steroid therapy contraindicated in setting of GI bleed. Start lactulose 2 times daily titrate to 2-3 soft bowel movements daily   Monitor CBC, BMP, LFTs, PT/INR daily   Advanced to full liquid diet. HCV RNA pending  Continue MTV and thiamine daily. GI to follow    Aleisha Salgado MD    [facility]    [phone].  Also available via [employer]

## 2022-10-12 NOTE — PROGRESS NOTES
Patient's monitor removed at this time for discharge. Patient's male purewick removed and attends placed on patient. Patient taken out per squad in stable condition to [facility].

## 2022-10-13 ENCOUNTER — APPOINTMENT (OUTPATIENT)
Dept: ULTRASOUND IMAGING | Age: 67
DRG: 381 | End: 2022-10-13
Payer: MEDICARE

## 2022-10-13 VITALS
DIASTOLIC BLOOD PRESSURE: 73 MMHG | HEIGHT: 68 IN | SYSTOLIC BLOOD PRESSURE: 126 MMHG | TEMPERATURE: 99 F | WEIGHT: 151.6 LBS | BODY MASS INDEX: 22.97 KG/M2 | RESPIRATION RATE: 18 BRPM | HEART RATE: 87 BPM | OXYGEN SATURATION: 91 %

## 2022-10-13 LAB
HCT VFR BLD CALC: 23.7 % (ref 40.5–52.5)
HEMOGLOBIN: 7.6 G/DL (ref 13.5–17.5)

## 2022-10-13 PROCEDURE — 6370000000 HC RX 637 (ALT 250 FOR IP): Performed by: INTERNAL MEDICINE

## 2022-10-13 PROCEDURE — 85014 HEMATOCRIT: CPT

## 2022-10-13 PROCEDURE — 2580000003 HC RX 258: Performed by: ANESTHESIOLOGY

## 2022-10-13 PROCEDURE — 6360000002 HC RX W HCPCS: Performed by: INTERNAL MEDICINE

## 2022-10-13 PROCEDURE — 2580000003 HC RX 258: Performed by: INTERNAL MEDICINE

## 2022-10-13 PROCEDURE — C9113 INJ PANTOPRAZOLE SODIUM, VIA: HCPCS | Performed by: INTERNAL MEDICINE

## 2022-10-13 PROCEDURE — 99239 HOSP IP/OBS DSCHRG MGMT >30: CPT | Performed by: INTERNAL MEDICINE

## 2022-10-13 PROCEDURE — 36415 COLL VENOUS BLD VENIPUNCTURE: CPT

## 2022-10-13 PROCEDURE — 85018 HEMOGLOBIN: CPT

## 2022-10-13 RX ORDER — LACTULOSE 10 G/15ML
20 SOLUTION ORAL DAILY PRN
Qty: 300 ML | Refills: 1 | Status: SHIPPED | OUTPATIENT
Start: 2022-10-13

## 2022-10-13 RX ORDER — DILTIAZEM HYDROCHLORIDE 120 MG/1
120 CAPSULE, COATED, EXTENDED RELEASE ORAL DAILY
Qty: 30 CAPSULE | Refills: 0 | Status: SHIPPED | OUTPATIENT
Start: 2022-10-13

## 2022-10-13 RX ORDER — LANOLIN ALCOHOL/MO/W.PET/CERES
325 CREAM (GRAM) TOPICAL
Qty: 30 TABLET | Refills: 1 | Status: SHIPPED | OUTPATIENT
Start: 2022-10-13

## 2022-10-13 RX ADMIN — Medication 40 MG: at 01:00

## 2022-10-13 RX ADMIN — SODIUM CHLORIDE, PRESERVATIVE FREE 10 ML: 5 INJECTION INTRAVENOUS at 08:52

## 2022-10-13 RX ADMIN — Medication 10 ML: at 08:47

## 2022-10-13 RX ADMIN — POTASSIUM CHLORIDE 10 MEQ: 7.46 INJECTION, SOLUTION INTRAVENOUS at 05:33

## 2022-10-13 RX ADMIN — DILTIAZEM HYDROCHLORIDE 30 MG: 60 TABLET, FILM COATED ORAL at 11:23

## 2022-10-13 RX ADMIN — POTASSIUM CHLORIDE 10 MEQ: 7.46 INJECTION, SOLUTION INTRAVENOUS at 06:57

## 2022-10-13 RX ADMIN — POTASSIUM CHLORIDE 10 MEQ: 7.46 INJECTION, SOLUTION INTRAVENOUS at 01:06

## 2022-10-13 RX ADMIN — Medication 100 MG: at 08:47

## 2022-10-13 RX ADMIN — LACTULOSE 20 G: 10 SOLUTION ORAL at 08:47

## 2022-10-13 RX ADMIN — DILTIAZEM HYDROCHLORIDE 30 MG: 60 TABLET, FILM COATED ORAL at 06:38

## 2022-10-13 RX ADMIN — POTASSIUM CHLORIDE 10 MEQ: 7.46 INJECTION, SOLUTION INTRAVENOUS at 02:42

## 2022-10-13 RX ADMIN — FOLIC ACID 1 MG: 1 TABLET ORAL at 08:47

## 2022-10-13 RX ADMIN — MULTIPLE VITAMINS W/ MINERALS TAB 1 TABLET: TAB at 08:47

## 2022-10-13 RX ADMIN — DILTIAZEM HYDROCHLORIDE 30 MG: 60 TABLET, FILM COATED ORAL at 01:00

## 2022-10-13 RX ADMIN — POTASSIUM CHLORIDE 10 MEQ: 7.46 INJECTION, SOLUTION INTRAVENOUS at 03:56

## 2022-10-13 NOTE — DISCHARGE SUMMARY
Name:  Remi Russell  Room:  /7561-70  MRN:    [id number]    Discharge Summary      This discharge summary is in conjunction with a complete physical exam done on the day of discharge. Discharging Physician: Dr. Ahsan Allan MD     Admit: 10/10/2022  Discharge:  10/13/2022     HPI taken from admission H&P:    The patient is a 79 y.o. male with PMH below, presents with coffee ground emesis, N/V, melena, epigastric pain, falls, lightheadedness, Hx ETOH cirrhosis. Pt reports that he has not felt well the last 2 d. He fell yd which he related to feeling lightheaded. He began having N/V yd, worsened overnight and today. Changed into coffee ground emesis. He also describes dark stools. He has had epigastric pain since yd. He has hax of ETOH cirrhosis. He denies to me that he drinks anymore. However, his wife reported to ER that he may still be drinking. He was also found to be in a fib w/ RVR in the ED. Diagnoses this Admission and Hospital Course   GI bleed, acute blood loss anemia  Hx of alcohol cirrhosis- elevated liver enzymes   Hx of esophageal varies   - pt with +occult stool  - hemoglobin on admission 7.8--previously 9.7.  - 2 units of PRBC ordered this am for hemoglobin 5.8  - cont to monitor h/h, transfuse for Hb<7   - started on octreotide and protonix drip  - NPO  - GI plan for EGD today- per note, plan for erythromycin 250 mg IV once for promotility prior to EGD  - avoid ASA and NSAIDS  Esophagogastroduodenoscopy-no esophageal or gastric varices. Erosive esophagitis. Moderate portal hypertensive gastropathy. .  3 nonbleeding AV malformation seen in duodenal bulb  DC octreotide  H/H stable. Rocephin for SBP prophylaxis  H/H stable.   Dc home     Thrombocytopenia  - 89--55  - likely 2/2 alcohol cirrhosis  - monitor     Sinus tachycardia vsMAT  - cardiology consulted  - rhythm noted with MAT likely exacerbated by GIB  - transfuse PRBC as needed  - started on diltiazem 30 mg every 6 hours, stop drip.  - adjust to control heart rate  - cardiology stated no need for further cardiac testing at this time  - replace electrolytes as needed. - monitor on telemetry       Lactic acidosis  - likely 2/2 above  - 4.8 on admission, trended down to 1.7  - continue IVF     Hypokalemia  - 3.0 today  - 40 mEq given   - monitor      Tobacco Dependence  -Recommended cessation  - nicotine patch ordered      History of hepatitis C    Procedures (Please Review Full Report for Details)  Esophagogastroduodenoscopy with argon plasma coagulation 10/11    Consults    Cardiology  GI    Physical Exam at Discharge:    /73   Pulse 87   Temp 99 °F (37.2 °C) (Oral)   Resp 18   Ht 5' 8\" (1.727 m)   Wt 151 lb 9.6 oz (68.8 kg)   SpO2 91%   BMI 23.05 kg/m²   Gen: No distress. Alert. Appears chronically ill and pale   Eyes: PERRL. No sclera icterus. No conjunctival injection. ENT: No discharge. Pharynx clear. Neck: No JVD. Trachea midline. Resp: No accessory muscle use. No crackles. No wheezes. No rhonchi. CV: Regular rate. Regular rhythm. No murmur. No rub. No edema. Capillary Refill: Brisk,< 3 seconds   Peripheral Pulses: +2 palpable, equal bilaterally   GI: diffuse TTP, Non-distended. Normal bowel sounds. No ascites   Skin: Warm and dry. No nodule on exposed extremities. No rash on exposed extremities. M/S: No cyanosis. No joint deformity. No clubbing. Neuro: Awake. Grossly nonfocal    Psych: Oriented x 3. No anxiety or agitation. CBC:   Recent Labs     10/10/22  1612 10/11/22  0446 10/11/22  0810 10/12/22  0836 10/12/22  1528 10/13/22  0015   WBC 6.8 5.0  --   --   --   --    HGB 7.8* 6.0*   < > 7.5* 8.6* 7.6*   HCT 25.1* 19.3*   < > 23.2* 26.4* 23.7*   MCV 83.7 84.0  --   --   --   --    PLT 89* 55*  --   --   --   --     < > = values in this interval not displayed.      BMP:   Recent Labs     10/10/22  1612 10/11/22  0446 10/12/22  1528    141 135*   K 3.5 3.0* 3.0*   CL 95* 102 100   CO2 19* 29 23   BUN 36* 35* 20   CREATININE 1.0 0.7* 0.6*     LIVER PROFILE:   Recent Labs     10/10/22  1612 10/11/22  0446 10/12/22  1528   AST 60* 59* 69*   ALT 28 22 29   BILIDIR  --   --  3.3*   BILITOT 6.6* 6.2* 5.5*   ALKPHOS 102 83 95     PT/INR:   Recent Labs     10/11/22  0446 10/12/22  1528   PROTIME 24.8* 20.9*   INR 2.26* 1.82*     APTT:   Recent Labs     10/11/22  0446   APTT 40.2*     CULTURES  COVID/Influenza: not detected    Blood Cultures: ngtd     RADIOLOGY  CT HEAD WO CONTRAST   Final Result      1. No acute ischemic change or other acute intracranial abnormality is   noted. No evidence of intracranial hemorrhage. 2.  Prominence of the sulci and/or CSF spaces suggests a degree of cerebral   atrophy. 3.  Right more so than left frontal and minimal bilateral maxillary chronic   sinusitis. XR CHEST PORTABLE   Final Result      1. Right AC joint is widened and an AC joint injury/separation is not   excluded though this may be nonacute, correlate clinically. Otherwise no   evidence of fracture. 2.  No acute cardiopulmonary disease noted. XR RADIUS ULNA RIGHT (2 VIEWS)   Final Result      Radiolucency and some soft tissue irregularity involving the anteromedial   forearm which may just be due to a bandage, though a soft tissue injury is   not excluded. No evidence of acute fracture.          US ABDOMEN COMPLETE    (Results Pending)        Discharge Medications     Medication List        START taking these medications      dilTIAZem 120 MG extended release capsule  Commonly known as: CARDIZEM CD  Take 1 capsule by mouth daily     ferrous sulfate 325 (65 Fe) MG EC tablet  Commonly known as: Fe Tabs  Take 1 tablet by mouth daily (with breakfast)  Replaces: ferrous sulfate 325 (65 Fe) MG tablet     lactulose 10 GM/15ML solution  Commonly known as: CHRONULAC  Take 30 mLs by mouth daily as needed (constipation)            CONTINUE taking these medications oxyCODONE-acetaminophen 5-325 MG per tablet  Commonly known as: PERCOCET     pantoprazole 40 MG tablet  Commonly known as: PROTONIX  TAKE 1 TABLET BY MOUTH IN THE MORNING BEFORE BREAKFAST            STOP taking these medications      albuterol-ipratropium  MCG/ACT Aers inhaler  Commonly known as: COMBIVENT RESPIMAT     ferrous sulfate 325 (65 Fe) MG tablet  Commonly known as: IRON 325  Replaced by: ferrous sulfate 325 (65 Fe) MG EC tablet               Where to Get Your Medications        These medications were sent to [id number] [facility], [address] [place] [address], [facility] [place] [facility] [address]      Phone: [phone]   dilTIAZem 120 MG extended release capsule  ferrous sulfate 325 (65 Fe) MG EC tablet  lactulose 10 GM/15ML solution           Discharged in stable condition to home    Follow Up: Follow up with PCP in 1 week    Total time spent on discharge is 35 minutes    KEIKO Aranda.

## 2022-10-13 NOTE — PROGRESS NOTES
Progress Note    Admit Date:  10/10/2022    Pt presented to the ED with complaints of melena and coffee-ground emesis since Sunday. Pt noted to be anemic. Afib with RVR vs sinus tachycardia. Cardiology  and GI consulted. Subjective:  Mr. Maria Antonia Groves is resting in bed. On oxygen 2 liters. Stated he is having some abdominal pain. No further coffee-ground emesis. Pt denies any recent alcohol use. Objective:   Patient Vitals for the past 4 hrs:   BP Temp Temp src Pulse Resp SpO2   10/13/22 0710 126/73 99 °F (37.2 °C) Oral 87 18 91 %   10/13/22 0637 110/77 -- -- 99 -- --            Intake/Output Summary (Last 24 hours) at 10/13/2022 0838  Last data filed at 10/12/2022 0902  Gross per 24 hour   Intake 10 ml   Output --   Net 10 ml         Physical Exam:    Gen: No distress. Alert. Appears chronically ill and pale   Eyes: PERRL. No sclera icterus. No conjunctival injection. ENT: No discharge. Pharynx clear. Neck: No JVD. Trachea midline. Resp: No accessory muscle use. No crackles. No wheezes. No rhonchi. CV: Regular rate. Regular rhythm. No murmur. No rub. No edema. Capillary Refill: Brisk,< 3 seconds   Peripheral Pulses: +2 palpable, equal bilaterally   GI: diffuse TTP, Non-distended. Normal bowel sounds. No ascites   Skin: Warm and dry. No nodule on exposed extremities. No rash on exposed extremities. M/S: No cyanosis. No joint deformity. No clubbing. Neuro: Awake. Grossly nonfocal    Psych: Oriented x 3. No anxiety or agitation. John Messina MD have reviewed the chart on [address] [place] [address] [facility] and personally interviewed and examined patient, reviewed the data (labs and imaging) and after discussion with my PA formulated the plan. Agree with note with the following edits. Physical exam:    /73   Pulse 87   Temp 99 °F (37.2 °C) (Oral)   Resp 18   Ht 5' 8\" (1.727 m)   Wt 151 lb 9.6 oz (68.8 kg)   SpO2 91%   BMI 23.05 kg/m²     Gen: No distress. Alert. Eyes: PERRL.  No sclera icterus. No conjunctival injection. ENT: No discharge. Pharynx clear. Neck: Trachea midline. Normal thyroid. Resp: No accessory muscle use. No crackles. No wheezes. No rhonchi. No dullness on percussion. CV: Regular rate. Regular rhythm. No murmur or rub. No edema. GI: Non-tender. Non-distended. No masses. No organomegaly. Normal bowel sounds. No hernia. Skin: Warm and dry. No nodule on exposed extremities. No rash on exposed extremities. Lymph: No cervical LAD. No supraclavicular LAD. M/S: No cyanosis. No joint deformity. No clubbing. Neuro: Awake. Moves all 4 extremities, non focal  Psych: Oriented x 3. No anxiety or agitation. JUDY Aranda       Scheduled Meds:   lactulose  20 g Oral BID    multivitamin  1 tablet Oral Daily    thiamine  100 mg Oral Daily    folic acid  1 mg Oral Daily    sodium chloride flush  5-40 mL IntraVENous 2 times per day    nicotine  1 patch TransDERmal Daily    pantoprazole (PROTONIX) 40 mg injection  40 mg IntraVENous Q12H    dilTIAZem  30 mg Oral 4 times per day    sodium chloride flush  5-40 mL IntraVENous 2 times per day    cefTRIAXone (ROCEPHIN) IV  1,000 mg IntraVENous Q24H       Continuous Infusions:   sodium chloride      sodium chloride      sodium chloride         PRN Meds:  ondansetron **OR** ondansetron, oxyCODONE, sodium chloride flush, sodium chloride, albuterol-ipratropium, magnesium sulfate, potassium chloride, sodium chloride, sodium chloride flush, sodium chloride      Data:  CBC:   Recent Labs     10/10/22  1612 10/11/22  0446 10/11/22  0810 10/12/22  0836 10/12/22  1528 10/13/22  0015   WBC 6.8 5.0  --   --   --   --    HGB 7.8* 6.0*   < > 7.5* 8.6* 7.6*   HCT 25.1* 19.3*   < > 23.2* 26.4* 23.7*   MCV 83.7 84.0  --   --   --   --    PLT 89* 55*  --   --   --   --     < > = values in this interval not displayed.        BMP:   Recent Labs     10/10/22  1612 10/11/22  0446 10/12/22  1528    141 135*   K 3.5 3.0* 3.0*   CL 95* 102 100   CO2 19* 29 23   BUN 36* 35* 20   CREATININE 1.0 0.7* 0.6*       LIVER PROFILE:   Recent Labs     10/10/22  1612 10/11/22  0446 10/12/22  1528   AST 60* 59* 69*   ALT 28 22 29   BILIDIR  --   --  3.3*   BILITOT 6.6* 6.2* 5.5*   ALKPHOS 102 83 95       PT/INR:   Recent Labs     10/11/22  0446 10/12/22  1528   PROTIME 24.8* 20.9*   INR 2.26* 1.82*         CULTURES  COVID/Influenza: not detected    Blood Cultures: pending      RADIOLOGY  CT HEAD WO CONTRAST   Final Result      1. No acute ischemic change or other acute intracranial abnormality is   noted. No evidence of intracranial hemorrhage. 2.  Prominence of the sulci and/or CSF spaces suggests a degree of cerebral   atrophy. 3.  Right more so than left frontal and minimal bilateral maxillary chronic   sinusitis. XR CHEST PORTABLE   Final Result      1. Right AC joint is widened and an AC joint injury/separation is not   excluded though this may be nonacute, correlate clinically. Otherwise no   evidence of fracture. 2.  No acute cardiopulmonary disease noted. XR RADIUS ULNA RIGHT (2 VIEWS)   Final Result      Radiolucency and some soft tissue irregularity involving the anteromedial   forearm which may just be due to a bandage, though a soft tissue injury is   not excluded. No evidence of acute fracture. Assessment/Plan:    GI bleed, acute blood loss anemia  Hx of alcohol cirrhosis- elevated liver enzymes   Hx of esophageal varies   - pt with +occult stool  - hemoglobin on admission 7.8--previously 9.7.  - 2 units of PRBC ordered this am for hemoglobin 5.8  - cont to monitor h/h, transfuse for Hb<7   - started on octreotide and protonix drip  - NPO  - GI plan for EGD today- per note, plan for erythromycin 250 mg IV once for promotility prior to EGD  - avoid ASA and NSAIDS  Esophagogastroduodenoscopy-no esophageal or gastric varices. Erosive esophagitis. Moderate portal hypertensive gastropathy. .  3 nonbleeding AV malformation seen in duodenal bulb  DC octreotide  H/H stable. Rocephin for SBP prophylaxis  H/H stable. Dc home    Thrombocytopenia  - 89--55  - likely 2/2 alcohol cirrhosis  - monitor    Sinus tachycardia vsMAT  - cardiology consulted  - rhythm noted with MAT likely exacerbated by GIB  - transfuse PRBC as needed  - started on diltiazem 30 mg every 6 hours, stop drip.  - adjust to control heart rate  - cardiology stated no need for further cardiac testing at this time  - replace electrolytes as needed. - monitor on telemetry      Lactic acidosis  - likely 2/2 above  - 4.8 on admission, trended down to 1.7  - continue IVF    Hypokalemia  - 3.0 today  - 40 mEq given   - monitor     Tobacco Dependence  -Recommended cessation  - nicotine patch ordered     History of hepatitis C      DVT Prophylaxis: scds  Diet: ADULT DIET; Full Liquid; Low Sodium (2 gm)  Code Status: Full Code      KEIKO Aranda.

## 2022-10-13 NOTE — CARE COORDINATION
[facility]  Noted dc order. Sent request to [facility] for [facility]'S [facility] hospitals [facility] [place] coverage. [facility] is not able to cover a SOC in pt's zipcode with dc home today. Telephone call to JAYNA BELCHER. Spoke with [employer]. JAYNA BELCHER is able to service pt for [facility]. needs. [employer] has epic access and can pull HC orders. Collaborated with CM to arrange HC.       Electronically signed by Andrea Ward RN on 10/13/2022 at 9:52 AM

## 2022-10-13 NOTE — PROGRESS NOTES
Progress Note    Patient Luis Carlos Luo  MRN: [id number]  YOB: 1955 Age: 79 y.o. Sex: male  Room: [place] [address]       Admitting Physician: Attila Gastelum MD   Date of Admission: 10/10/2022  3:49 PM   Primary Care Physician: Carlos Mireles MD     Subjective:  Luis Carlos Luo was seen and examined. We are following for GI bleed. Patient is s/p EGD on 10/11/22 with   -- No acute events overnight  -- Bowel movements last night and this morning were brown, without melena. ROS:  Constitutional: Denies fever, no change in appetite  Respiratory: Denies cough or shortness of breath  Cardiovascular: Denies chest pain or edema    Objective:  Vital Signs:   Vitals:    10/13/22 0710   BP: 126/73   Pulse: 87   Resp: 18   Temp: 99 °F (37.2 °C)   SpO2: 91%         Physical Exam:  Constitutional: Alert and oriented x 4. No acute distress. Respiratory: Respirations nonlabored, no crepitus  GI: Abdomen nondistended, soft, and nontender. Neurological: No focal deficits noted. No asterixis.     Intake/Output:    Intake/Output Summary (Last 24 hours) at 10/13/2022 1220  Last data filed at 10/13/2022 0854  Gross per 24 hour   Intake 240 ml   Output --   Net 240 ml        Current Medications:  Current Facility-Administered Medications   Medication Dose Route Frequency Provider Last Rate Last Admin    lactulose (CHRONULAC) 10 GM/15ML solution 20 g  20 g Oral BID Verona Gonzales MD   20 g at 10/13/22 0847    ondansetron (ZOFRAN-ODT) disintegrating tablet 4 mg  4 mg Oral Q8H PRN Attila Gastelum MD        Or    ondansetron [facility]) injection 4 mg  4 mg IntraVENous Q6H PRN Attila Gastelum MD        therapeutic multivitamin-minerals 1 tablet  1 tablet Oral Daily Attila Gastelum MD   1 tablet at 10/13/22 0847    thiamine tablet 100 mg  100 mg Oral Daily Attila Gastelum MD   100 mg at 89/04/27 6692    folic acid (FOLVITE) tablet 1 mg  1 mg Oral Daily Attila Gastelum MD   1 mg at 10/13/22 3036 oxyCODONE (ROXICODONE) immediate release tablet 5 mg  5 mg Oral Q12H PRN Merri Fleischer, MD   5 mg at 10/12/22 1454    sodium chloride flush 0.9 % injection 5-40 mL  5-40 mL IntraVENous 2 times per day Merri Fleischer, MD   10 mL at 10/13/22 3976    sodium chloride flush 0.9 % injection 5-40 mL  5-40 mL IntraVENous PRN Merri Fleischer, MD        0.9 % sodium chloride infusion   IntraVENous PRN Merri Fleischer, MD        nicotine (NICODERM CQ) 21 MG/24HR 1 patch  1 patch TransDERmal Daily Merri Fleischer, MD   1 patch at 10/13/22 0848    pantoprazole (PROTONIX) 40 mg in sodium chloride (PF) 10 mL injection  40 mg IntraVENous Q12H Merri Fleischer, MD   40 mg at 10/13/22 0100    albuterol-ipratropium (COMBIVENT RESPIMAT)  MCG/ACT inhaler 1 puff  1 puff Inhalation Q4H PRN Merri Fleischer, MD        magnesium sulfate 1000 mg in dextrose 5% 100 mL IVPB  1,000 mg IntraVENous PRN Merri Fleischer, MD        potassium chloride 10 mEq/100 mL IVPB (Peripheral Line)  10 mEq IntraVENous PRN Merri Fleischer,  mL/hr at 10/13/22 0657 10 mEq at 10/13/22 0657    0.9 % sodium chloride infusion   IntraVENous PRN Merri Fleischer, MD        dilTIAZem (CARDIZEM) tablet 30 mg  30 mg Oral 4 times per day No Valladares MD   30 mg at 10/13/22 1123    sodium chloride flush 0.9 % injection 5-40 mL  5-40 mL IntraVENous 2 times per day Sharyn Elliott MD   10 mL at 10/13/22 0847    sodium chloride flush 0.9 % injection 5-40 mL  5-40 mL IntraVENous PRN Sharyn Elliott MD        0.9 % sodium chloride infusion   IntraVENous PRN Sharyn Elliott MD             Recent Imaging:   XR CHEST PORTABLE  Narrative: EXAM:    XR Chest, 1 View    EXAM DATE/TIME:    10/10/2022 4:25 pm    CLINICAL HISTORY:    ORDERING SYSTEM PROVIDED  tachycardia, dizzy, fall  TECHNOLOGIST PROVIDED  HISTORY:  Reason for exam:->tachycardia, dizzy, fall  Reason for Exam:  tachycardia, dizzy, fall    TECHNIQUE:    Frontal view of the chest.    COMPARISON:    09/18/2021    FINDINGS:    Lungs:  No acute findings. Small calcified granuloma in the lower lateral  left lung field. Pleural space:  No acute findings. No pneumothorax. Heart:  No acute findings. No cardiomegaly. Mediastinum:  No acute findings. Bones/joints:  Right AC joint is widened and an AC joint injury/separation is  not excluded though this may be nonacute, correlate clinically. Otherwise no  evidence of fracture. Impression: 1. Right AC joint is widened and an AC joint injury/separation is not  excluded though this may be nonacute, correlate clinically. Otherwise no  evidence of fracture. 2.  No acute cardiopulmonary disease noted. XR RADIUS ULNA RIGHT (2 VIEWS)  Narrative: EXAM:    XR Right Forearm, 2 Views    EXAM DATE/TIME:    10/10/2022 4:25 pm    CLINICAL HISTORY:    ORDERING SYSTEM PROVIDED  fall  TECHNOLOGIST PROVIDED HISTORY:  Reason for  exam:->fall  Reason for Exam: fell    TECHNIQUE:    Frontal and lateral views of the right forearm. COMPARISON:    No relevant prior studies available. FINDINGS:    Bones/joints:  No acute findings. No acute fracture. No dislocation. Soft tissues:  Radiolucency and some soft tissue irregularity involving the  anteromedial forearm which may just be due to a bandage, though a soft tissue  injury is not excluded. Impression: Radiolucency and some soft tissue irregularity involving the anteromedial  forearm which may just be due to a bandage, though a soft tissue injury is  not excluded. No evidence of acute fracture. CT HEAD WO CONTRAST  Narrative: EXAM:    CT Head Without Intravenous Contrast    EXAM DATE/TIME:    10/10/2022 4:30 pm    CLINICAL HISTORY:    ORDERING SYSTEM PROVIDED  fall  TECHNOLOGIST PROVIDED HISTORY:  Reason for  exam:->fall  Has a \"code stroke\" or \"stroke alert\" been called? ->No  Decision  Support Exception - unselect if not a suspected or confirmed emergency  medical condition->Emergency Medical Condition (MA)  Reason for Exam: pt  falling    TECHNIQUE:    Axial computed tomography images of the head/brain without intravenous  contrast.  This CT exam was performed using one or more of the following dose  reduction techniques:  automated exposure control, adjustment of the mA  and/or kV according to patient size, and/or use of iterative reconstruction  technique. COMPARISON:    07/08/2018    FINDINGS:    Brain:  No acute ischemic change or other acute intracranial abnormality is  noted. No evidence of intracranial hemorrhage. Prominence of the sulci  and/or CSF spaces suggests a degree of cerebral atrophy. Ventricles:  No acute findings. No ventriculomegaly. Bones/joints:  No acute findings. No acute fracture. Soft tissues:  No acute findings. Sinuses:  Right more so than left frontal and minimal bilateral maxillary  chronic sinusitis. Mastoid air cells:  Unremarkable as visualized. No mastoid effusion. Impression: 1. No acute ischemic change or other acute intracranial abnormality is  noted. No evidence of intracranial hemorrhage. 2.  Prominence of the sulci and/or CSF spaces suggests a degree of cerebral  atrophy. 3.  Right more so than left frontal and minimal bilateral maxillary chronic  sinusitis. Labs:   Recent Labs     10/10/22  1612 10/11/22  0446 10/11/22  0810 10/11/22  1944 10/12/22  0217 10/12/22  0836 10/12/22  1528 10/13/22  0015   HGB 7.8* 6.0*   < > 7.7* 7.3* 7.5* 8.6* 7.6*   WBC 6.8 5.0  --   --   --   --   --   --    PROT 7.4 6.3*  --   --   --   --  7.5  --    LABALBU 3.7 3.1*  --   --   --   --  3.8  --    ALKPHOS 102 83  --   --   --   --  95  --    ALT 28 22  --   --   --   --  29  --    AST 60* 59*  --   --   --   --  69*  --    BILITOT 6.6* 6.2*  --   --   --   --  5.5*  --    BILIDIR  --   --   --   --   --   --  3.3*  --    IBILI  --   --   --   --   --   --  2.2*  --     < > = values in this interval not displayed. Assessment:    79year old M with PMH significant for chronic hepatitis C and Alcohol cirrhosis, upper GI bleed from esophageal varices, Acute MI, and COPD presents with coffee ground emesis and melena. Labs reviewed with Hgb 7.8 on admission, down to 5.8 and hematocrit 18.6 on 10/11. Baseline Hgb ~ 9. S/p 2 units PRBC on 10/11 with stable Hb 7.5 g/dL. He is s/p EGD on 10/11/22 with LA grade B erosive esophagitis and coagulation of 3 duodenal nonbleeding AVMs. 10/11 EGD: Normal upper and mid esophagus. No evidence of esophageal or gastric varices. LA grade B erosive esophagitis in the distal esophagus. Irregular Z-line at 40 cm from incisors. Moderate portal hypertensive gastropathy. Three 3 to 6 mm in nonbleeding arteriovenous malformation seen in the duodenal bulb and second portion of duodenum. Coagulation for bleeding prevention using argon plasma performed. Impression:   1. Melenic stools likely due to erosive esophagtis, moderate portal hypertensive gastropathy. 2. Alcoholic hepatitis in setting of ETOH/HCV cirrhosis - MELD-Na 23, MDF 60.5    Plan:  Hb stable at 7.6 g/dL. Monitor and transfuse to keep Hgb > 7 g/dL   Continue Protonix 40 mg IV BID, transition to PO at discharge to complete 8-12 weeks  Monitor PT/INR and correct coagulopathy  Continue Rocephin 1 gm IV daily for sepsis prophylaxis  Steroid therapy contraindicated in setting of GI bleed. Continue lactulose 2 times daily titrate to 2-3 soft bowel movements daily   Monitor CBC, BMP, LFTs, PT/INR daily   HCV RNA pending  Continue MTV and thiamine daily. Due for colon cancer surveillance given family history of colon cancer and personal hx of polyps. If no evidence of ongoing bleeding this can be completed as an outpatient  Supportive care  Counseled on complete alcohol cessation. Follow up with GI in 4-6 weeks      MD Jennifer Lopez    [phone].  Also available via [employer]

## 2022-10-13 NOTE — PROGRESS NOTES
Patient educated on discharge instructions as well as new medications use, dosage, administration and possible side effects. Patient verified knowledge. IV removed without difficulty and dry dressing in place. Telemetry monitor removed and returned to [facility]. Pt left facility in stable condition to Home with all of their personal belongings.

## 2022-10-13 NOTE — FLOWSHEET NOTE
10/13/22 0710   Vital Signs   Temp 99 °F (37.2 °C)   Temp Source Oral   Heart Rate 87   Heart Rate Source Monitor   Resp 18   /73   BP Location Right upper arm   MAP (Calculated) 90.67   Level of Consciousness 0   MEWS Score 1   Oxygen Therapy   SpO2 91 %   O2 Device None (Room air)   Pt. Resting in bed. Call light in reach. Shift assessment completed see flow sheet. Denies any needs at this time. Will continue to monitor.

## 2022-10-13 NOTE — PROGRESS NOTES
Pt awake in bed. Assessment completed and medications given per MAR. A&0x4. VS as charted in flowsheet. Potassium at 15:28 was 3.0 which was never replaced. Replacement started with PRN orders. Pt refuses bed alarm. States he has been walking around the room all day. Pt educated on fall precautions and bed alarm left off. Pt denies any further needs at this time. Call light in reach and bed in lowest position.

## 2022-10-13 NOTE — DISCHARGE INSTR - COC
Continuity of Care Form    Patient Name: Leigh Magana   :  1955  MRN:  [id number]    Admit date:  10/10/2022  Discharge date:  10/13/2022    Code Status Order: Full Code   Advance Directives:   Advance Care Flowsheet Documentation       Date/Time Healthcare Directive Type of Healthcare Directive Copy in [address] Agent's Name Healthcare Agent's Phone Number    10/11/22 1413 No, patient does not have an advance directive for healthcare treatment -- -- -- -- --            Admitting Physician:  Mulu Orellana MD  PCP: Flor Penn MD    Discharging Nurse:   Leslee Khalil Unit/Room#: [address] [facility] Unit Phone Number: [phone]    Emergency Contact:   Extended Emergency Contact Information  Primary Emergency Contact: Christine [facility]  Address: [address] [place] [address]  Mobile Phone: [phone]  Relation: Spouse  Secondary Emergency Contact: Quadra Quadrpiero [address] Phone: [phone]  Work Phone: [phone]  Mobile Phone: [phone]  Relation: Child    Past Surgical History:  Past Surgical History:   Procedure Laterality Date    CARDIAC CATHETERIZATION      heart cath    COLONOSCOPY  2017    Initial Colonoscopy; Multiple Colon Polyps    ENDOSCOPY, COLON, DIAGNOSTIC      EYE SURGERY      cataract removal    SC NJX DX/THER SBST INTRLMNR CRV/THRC W/IMG GDN Right 2018    RIGHT CERVICAL SEVEN THORACIC ONE EPIDURAL STEROID INJECTION SITE CONFIRMED BY FLUOROSCOPY performed by Julia Mitchell MD at [facility] [address]  2018    Gastritis, [place]    UPPER GASTROINTESTINAL ENDOSCOPY N/A 10/11/2022    EGD CONTROL HEMORRHAGE performed by Guanaco Ireland MD at [address] [facility]       Immunization History:   Immunization History   Administered Date(s) Administered    Tdap (Boostrix, Adacel) 10/10/2022       Active Problems:  Patient Active Problem List   Diagnosis Code    Open wound of finger with complication V08.093A    Chronic dermatitis L30.9    Chest pain R07.9    Unstable angina (HCC) I20.0    Alcohol abuse F10.10    Hypokalemia E87.6    Thrombocytopenia (HCC) D69.6    Macrocytosis D75.89    Elevated transaminase level R74.01    Coronary artery disease involving native coronary artery of native heart without angina pectoris I25.10    Elevated LFTs R79.89    Hyperkalemia E87.5    Rectal mass K62.89    Proctitis K62.89    Right sided weakness R53.1    Cervical disc disease with myelopathy M50.00    Numbness R20.0    Foraminal stenosis of cervical region M48.02    Abnormal EKG K04.36    Alcoholic cirrhosis of liver without ascites (HCC) K70.30    Pancytopenia (HCC) D61.818    Hyponatremia E87.1    Smoker F17.200    Weakness R53.1    Multiple falls R29.6    History of hepatitis C Z86.19    History of cirrhosis Z87.19    Right hip pain M25.551    Coffee ground emesis K92.0    Multifocal atrial tachycardia (HCC) I47.1    Gastrointestinal hemorrhage K92.2    ABLA (acute blood loss anemia) D62    Cirrhosis of liver without ascites (HCC) K74.60       Isolation/Infection:   Isolation            No Isolation          Patient Infection Status       Infection Onset Added Last Indicated Last Indicated By Review Planned Expiration Resolved Resolved By    None active    Resolved    COVID-19 (Rule Out) 10/10/22 10/10/22 10/10/22 COVID-19 & Influenza Combo (Ordered)   10/10/22 Rule-Out Test Resulted    COVID-19 (Rule Out) 09/18/21 09/18/21 09/18/21 COVID-19 & Influenza Combo (Ordered)   09/18/21 Rule-Out Test Resulted    COVID-19 (Rule Out) 04/16/21 04/16/21 04/16/21 COVID-19, Rapid (Ordered)   04/16/21 Rule-Out Test Resulted            Nurse Assessment:  Last Vital Signs: /73   Pulse 87   Temp 99 °F (37.2 °C) (Oral)   Resp 18   Ht 5' 8\" (1.727 m)   Wt 151 lb 9.6 oz (68.8 kg)   SpO2 91%   BMI 23.05 kg/m²     Last documented pain score (0-10 scale): Pain Level: 6  Last Weight:   Wt Readings from Last 1 Encounters:   10/13/22 151 lb 9.6 oz (68.8 kg)     Mental Status:  oriented, alert, coherent, logical, thought processes intact, and able to concentrate and follow conversation    IV Access:  - None    Nursing Mobility/ADLs:  Walking   Independent  Transfer  Independent  Bathing  Independent  Dressing  Independent  [address] Delivery   whole    Wound Care Documentation and Therapy:        Elimination:  Continence: Bowel: Yes  Bladder: Yes  Urinary Catheter: None   Colostomy/Ileostomy/Ileal Conduit: No       Date of Last BM: 10/13/22      Intake/Output Summary (Last 24 hours) at 10/13/2022 0849  Last data filed at 10/12/2022 0902  Gross per 24 hour   Intake 10 ml   Output --   Net 10 ml     I/O last 3 completed shifts: In: 10 [I.V.:10]  Out: 400 [Urine:400]    Safety Concerns:     None    Impairments/Disabilities:      None    Nutrition Therapy:  Current Nutrition Therapy:   - Oral Diet:  General    Routes of Feeding: Oral  Liquids: No Restrictions  Daily Fluid Restriction: no  Last Modified Barium Swallow with Video (Video Swallowing Test): not done    Treatments at the Time of Hospital Discharge:   Respiratory Treatments: n/a  Oxygen Therapy:  is not on home oxygen therapy.   Ventilator:    - No ventilator support    Rehab Therapies: Physical Therapy and Occupational Therapy  Weight Bearing Status/Restrictions: No weight bearing restrictions  Other Medical Equipment (for information only, NOT a DME order):  N/A  Other Treatments: n/A    Patient's personal belongings (please select all that are sent with patient):  None    RN SIGNATURE:  Electronically signed by Riddhi Araujo RN on 10/13/22 at 9:01 AM EDT    CASE MANAGEMENT/SOCIAL WORK SECTION    Inpatient Status Date: 10/10/2022    Readmission Risk Assessment Score:  Readmission Risk              Risk of Unplanned Readmission:  12           Discharging to Facility/ Agency   Name: [facility].  Address:  Phone:  [phone]  Fax:    Dialysis Facility (if applicable)   Name:  Address:  Dialysis Schedule:  Phone:  Fax:    / signature: Electronically signed by Toby Pereira RN on 10/13/22 at 8:50 AM EDT    PHYSICIAN SECTION    Prognosis: Good    Condition at Discharge: Stable    Rehab Potential (if transferring to Rehab):     Recommended Labs or Other Treatments After Discharge: SN, PT/OT, MSW    Physician Certification: I certify the above information and transfer of Luis Enrique Arevalo  is necessary for the continuing treatment of the diagnosis listed and that he requires Home Care for greater 30 days.      Update Admission H&P: Changes in H&P as follows - see attached    PHYSICIAN SIGNATURE: Dr. Larisa Lynn MD/ Electronically signed by Toby Pereira RN on 10/13/22 at 8:51 AM EDT

## 2022-10-14 ENCOUNTER — CARE COORDINATION (OUTPATIENT)
Dept: CASE MANAGEMENT | Age: 67
End: 2022-10-14

## 2022-10-14 LAB
BLOOD CULTURE, ROUTINE: NORMAL
CULTURE, BLOOD 2: NORMAL

## 2022-10-15 LAB
HCV QNT BY NAAT IU/ML: NOT DETECTED IU/ML
HCV QNT BY NAAT LOG IU/ML: NOT DETECTED LOG IU/ML
INTERPRETATION: NOT DETECTED

## 2022-10-17 ENCOUNTER — TELEPHONE (OUTPATIENT)
Dept: INTERNAL MEDICINE CLINIC | Age: 67
End: 2022-10-17

## 2022-10-17 ENCOUNTER — CARE COORDINATION (OUTPATIENT)
Dept: CASE MANAGEMENT | Age: 67
End: 2022-10-17

## 2022-10-17 NOTE — TELEPHONE ENCOUNTER
Brianna lauren. Physical Therapy     Referred by: Rosemarie Avalos PA-C; Medical Diagnosis (from order):    Diagnosis Information      Diagnosis    844.2 (ICD-9-CM) - S83.511A (ICD-10-CM) - Rupture of anterior cruciate ligament of right knee, initial encounter    V45.89 (ICD-9-CM) - Z98.890 (ICD-10-CM) - S/P ACL reconstruction              Daily Treatment Note    Visit:  8     SUBJECTIVE                                                                                                             Knee is hurting today, but she is not sure why. \"It is fine.\" Driving without difficulty. Stopped wearing the brace. It is not buckling anymore. Exercises going okay, but it gets sore after doing the exercises.  Functional Change: Driving     OBJECTIVE                                                                                                                     Range of Motion (ROM)   (degrees unless noted; active unless noted; norms in ( ); negative=lacking to 0, positive=beyond 0)   Knee:    - Flexion (150):        • Right: 136      TREATMENT                                                                                                                  Therapeutic Exercise:  Recumbent bike, level 1 for 10 minutes     Supine SAQ 10x5 second holds (with 1 pound ankle weight)  Straight leg raise x15  (with 1 pound ankle weight)  Side lying hip abduction x15 (with 1 pound ankle weight)  Standing knee flexion x15 (with 1 pound ankle weight)  Prone hip extension with purple ball between feet x15  4 point hip extension with ball behind the knee x15   Bridging with legs on ball 10x5 seconds   Bridging with red theraband around knees 10x5 seconds   Sit to stand from plinth onto airex with red theraband around knees for cueing to avoid dynamic valgum x15  Side step with red theraband 10 steps x2     Single leg calf raise with 2 second isometric hold x15  Standing balance on tilt board (anterior/posterior and medial/lateral direction) x30 seconds  each     Leg press with 55 pounds x15   Squats with ball on wall x15     Skilled input: as detailed above    Writer verbally educated and received verbal consent for hand placement, positioning of patient, and techniques to be performed today from patient for clothing adjustments for techniques, hand placement and palpation for techniques and therapist position for techniques as described above and how they are pertinent to the patient's plan of care.    Home Exercise Program: 2/8/2021:  Quad sets 10x5 second holds, Straight leg raise x10, Heel slides x10, Calf stretch x30 seconds, Side-Lying hip abduction x10, Side-Lying hip adduction x10, Prone hip extension x10, Mini squats x10      ASSESSMENT                                                                                                             Good endurance with warm up on bike. Challenged stability by performing bridging with legs on ball. Progressed to resisted side step for functional strengthening. Leg press for closed chain and partial weightbearing strengthening. Demonstrates good familiarity with all previous exercises for open and chain strengthening. Patient tolerated session well. Patient would benefit from skilled therapy to treat patient's impairments to improve patient's ability to perform functional activities.      Patient Education:   Results of above outlined education: Needs reinforcement, Demonstrates understanding and Verbalizes understanding      PLAN                                                                                                                           Suggestions for next session as indicated: Progress per plan of care: continue with quad, and proximal hip strength, movement pattern retraining to avoid dynamic valgum       Therapy procedure time and total treatment time can be found documented on the Time Entry flowsheet

## 2022-10-17 NOTE — TELEPHONE ENCOUNTER
----- Message from Xander Thompson MD sent at 10/17/2022 10:52 AM EDT -----  Contact: Brianna/ [address] [place] [address] [phone]  ok  ----- Message -----  From: Enrique Harper  Sent: 10/17/2022  10:33 AM EDT  To: Xander Thompson MD    Caller is requesting for you to follow home health orders. Please advise.

## 2022-10-17 NOTE — CARE COORDINATION
[facility] Care Transitions Initial Follow Up Call    Call within 2 business days of discharge: Yes    Patient: Danya Lloyd Patient : 1955   MRN: [id number]  Reason for Admission: GIB, acute blood loss anemia, hx of alcohol cirrhosis, elevated liver enzymes, hx of esophageal varices, thrombocytopenia, sinus tach vs MAT, lactic acidosis, hypokalemia, tobacco dependence, hx of Hep C -> home with [facility] avoid ASA and NSAIDs  Discharge Date: 10/13/22 RARS: Readmission Risk Score: 13.5      Last Discharge [facility] Facility       Date Complaint Diagnosis Description Type Department Provider    10/10/22 Emesis Gastrointestinal hemorrhage, unspecified gastrointestinal hemorrhage type . .. ED to Hosp-Admission (Discharged) (ADMITTED) Chris Trinidad MD; Mi. .. Was this an external facility discharge? No Discharge Facility: NA    2nd attempt - CTN attempted follow-up outreach to patient. Message left including CTN contact information for return call. No further outreach scheduled at this time. Follow Up  No future appointments.     Noah Michel RN  Care Transition Nurse  [phone] mobile

## 2022-10-18 ENCOUNTER — TELEPHONE (OUTPATIENT)
Dept: INTERNAL MEDICINE CLINIC | Age: 67
End: 2022-10-18

## 2022-10-18 NOTE — TELEPHONE ENCOUNTER
----- Message from Nigel Romero MD sent at 10/18/2022  7:53 AM EDT -----  Contact: Dyanna Hashimoto [phone]  Do not fill. He will need to call ortho or see pain management  ----- Message -----  From: Van Arellano  Sent: 10/17/2022   4:11 PM EDT  To: Nigel Romero MD    Oxycodone has not been prescribed by you. Oxycodone HCL 5 mg was prescribed by Maya Zaidi on 9/14/22 for a 7 day supply, quantity of 14.  Please advise.  ----- Message -----  From: Court Rolling  Sent: 10/17/2022   3:49 PM EDT  To: Van Arellano    Patient is requesting refill for oxyCODONE-acetaminophen (PERCOCET) 5-325 MG per tablet and pantoprazole (PROTONIX) 40 MG tablet  Kaylin

## 2022-10-25 ENCOUNTER — TELEPHONE (OUTPATIENT)
Dept: INTERNAL MEDICINE CLINIC | Age: 67
End: 2022-10-25

## 2022-10-25 RX ORDER — FUROSEMIDE 20 MG/1
20 TABLET ORAL DAILY
Qty: 30 TABLET | Refills: 0 | Status: SHIPPED | OUTPATIENT
Start: 2022-10-25

## 2022-10-25 NOTE — TELEPHONE ENCOUNTER
----- Message from Jaja Hernandez MD sent at 10/25/2022  8:20 AM EDT -----  Contact: Chino Allen [phone]  STart Lasix 20 mg po daily # 30  ----- Message -----  From: Lucy Torres MD  Sent: 10/24/2022   1:34 PM EDT  To: Jaja Hernandez MD      ----- Message -----  From: Min Borges  Sent: 10/24/2022  12:42 PM EDT  To: MD Dr. Mariana Jerome pt- states his legs, feet, and stomach are very swollen and have been since he was discharged from the hospital. States he is not taking any medication for the swelling. Please advise.  ----- Message -----  From: Jaja Hernandez MD  Sent: 10/18/2022   7:53 AM EDT  To: Min Borges    Do not fill. He will need to call ortho or see pain management  ----- Message -----  From: Min Borges  Sent: 10/17/2022   4:11 PM EDT  To: Jaja Hernandez MD    Oxycodone has not been prescribed by you. Oxycodone HCL 5 mg was prescribed by Zarina Model on 9/14/22 for a 7 day supply, quantity of 14.  Please advise.  ----- Message -----  From: Karely Lynn  Sent: 10/17/2022   3:49 PM EDT  To: Min Borges    Patient is requesting refill for oxyCODONE-acetaminophen (PERCOCET) 5-325 MG per tablet and pantoprazole (PROTONIX) 40 MG tablet  Bevely Record

## 2022-10-25 NOTE — TELEPHONE ENCOUNTER
----- Message from Jessie Santos MD sent at 10/25/2022  8:20 AM EDT -----  Contact: [employer] [phone]  STart Lasix 20 mg po daily # 30  ----- Message -----  From: Chuck Vázquez MD  Sent: 10/24/2022   1:34 PM EDT  To: Jessie Santos MD      ----- Message -----  From: Alisa Lewis  Sent: 10/24/2022  12:42 PM EDT  To: MD Dr. Sera Velazquez pt- states his legs, feet, and stomach are very swollen and have been since he was discharged from the hospital. States he is not taking any medication for the swelling. Please advise.  ----- Message -----  From: Jessie Santos MD  Sent: 10/18/2022   7:53 AM EDT  To: Alisa Lewis    Do not fill. He will need to call ortho or see pain management  ----- Message -----  From: Alisa Lewis  Sent: 10/17/2022   4:11 PM EDT  To: Jessie Santos MD    Oxycodone has not been prescribed by you. Oxycodone HCL 5 mg was prescribed by Monica Vasques on 9/14/22 for a 7 day supply, quantity of 14.  Please advise.  ----- Message -----  From: Ellie Gayle  Sent: 10/17/2022   3:49 PM EDT  To: Alisa Lewis    Patient is requesting refill for oxyCODONE-acetaminophen (PERCOCET) 5-325 MG per tablet and pantoprazole (PROTONIX) 40 MG tablet  Elba Huddleston

## 2022-11-01 DIAGNOSIS — M79.89 RIGHT LEG SWELLING: Primary | ICD-10-CM

## 2022-11-01 DIAGNOSIS — K70.31 ALCOHOLIC CIRRHOSIS OF LIVER WITH ASCITES (HCC): ICD-10-CM

## 2022-11-18 ENCOUNTER — HOSPITAL ENCOUNTER (OUTPATIENT)
Age: 67
Discharge: HOME | End: 2022-11-18
Payer: MEDICARE

## 2022-11-18 DIAGNOSIS — K70.31 ALCOHOLIC CIRRHOSIS OF LIVER WITH ASCITES (HCC): ICD-10-CM

## 2022-11-18 LAB
INR BLD: 1.46 (ref 0.87–1.14)
PROTHROMBIN TIME: 17.5 SEC (ref 11.7–14.5)

## 2022-11-18 PROCEDURE — 85610 PROTHROMBIN TIME: CPT

## 2022-11-18 PROCEDURE — 80076 HEPATIC FUNCTION PANEL: CPT

## 2022-11-18 PROCEDURE — 80048 BASIC METABOLIC PNL TOTAL CA: CPT

## 2022-11-18 PROCEDURE — 85025 COMPLETE CBC W/AUTO DIFF WBC: CPT

## 2022-11-18 PROCEDURE — 82105 ALPHA-FETOPROTEIN SERUM: CPT

## 2022-11-18 PROCEDURE — 36415 COLL VENOUS BLD VENIPUNCTURE: CPT

## 2022-11-19 LAB
ALBUMIN SERPL-MCNC: 3.6 G/DL (ref 3.4–5)
ALP BLD-CCNC: 134 U/L (ref 40–129)
ALT SERPL-CCNC: 18 U/L (ref 10–40)
ANION GAP SERPL CALCULATED.3IONS-SCNC: 18 MMOL/L (ref 3–16)
AST SERPL-CCNC: 47 U/L (ref 15–37)
BASOPHILS ABSOLUTE: 0 K/UL (ref 0–0.2)
BASOPHILS RELATIVE PERCENT: 1.1 %
BILIRUB SERPL-MCNC: 4 MG/DL (ref 0–1)
BILIRUBIN DIRECT: 2.1 MG/DL (ref 0–0.3)
BILIRUBIN, INDIRECT: 1.9 MG/DL (ref 0–1)
BUN BLDV-MCNC: 19 MG/DL (ref 7–20)
CALCIUM SERPL-MCNC: 9.6 MG/DL (ref 8.3–10.6)
CHLORIDE BLD-SCNC: 98 MMOL/L (ref 99–110)
CO2: 21 MMOL/L (ref 21–32)
CREAT SERPL-MCNC: 0.9 MG/DL (ref 0.8–1.3)
EOSINOPHILS ABSOLUTE: 0.1 K/UL (ref 0–0.6)
EOSINOPHILS RELATIVE PERCENT: 2.6 %
GFR SERPL CREATININE-BSD FRML MDRD: >60 ML/MIN/{1.73_M2}
GLUCOSE BLD-MCNC: 86 MG/DL (ref 70–99)
HCT VFR BLD CALC: 31 % (ref 40.5–52.5)
HEMOGLOBIN: 9.4 G/DL (ref 13.5–17.5)
LYMPHOCYTES ABSOLUTE: 0.5 K/UL (ref 1–5.1)
LYMPHOCYTES RELATIVE PERCENT: 15 %
MCH RBC QN AUTO: 24.6 PG (ref 26–34)
MCHC RBC AUTO-ENTMCNC: 30.3 G/DL (ref 31–36)
MCV RBC AUTO: 81.4 FL (ref 80–100)
MONOCYTES ABSOLUTE: 0.4 K/UL (ref 0–1.3)
MONOCYTES RELATIVE PERCENT: 10.4 %
NEUTROPHILS ABSOLUTE: 2.4 K/UL (ref 1.7–7.7)
NEUTROPHILS RELATIVE PERCENT: 70.9 %
PDW BLD-RTO: 21.4 % (ref 12.4–15.4)
PLATELET # BLD: 108 K/UL (ref 135–450)
PMV BLD AUTO: 9.4 FL (ref 5–10.5)
POTASSIUM SERPL-SCNC: 3 MMOL/L (ref 3.5–5.1)
RBC # BLD: 3.81 M/UL (ref 4.2–5.9)
SODIUM BLD-SCNC: 137 MMOL/L (ref 136–145)
TOTAL PROTEIN: 8.2 G/DL (ref 6.4–8.2)
WBC # BLD: 3.5 K/UL (ref 4–11)

## 2022-12-02 ENCOUNTER — APPOINTMENT (OUTPATIENT)
Dept: CT IMAGING | Age: 67
End: 2022-12-02
Payer: MEDICARE

## 2022-12-02 ENCOUNTER — HOSPITAL ENCOUNTER (EMERGENCY)
Age: 67
Discharge: HOME | End: 2022-12-02
Attending: EMERGENCY MEDICINE
Payer: MEDICARE

## 2022-12-02 VITALS
HEIGHT: 68 IN | RESPIRATION RATE: 15 BRPM | SYSTOLIC BLOOD PRESSURE: 93 MMHG | TEMPERATURE: 97.7 F | BODY MASS INDEX: 21.98 KG/M2 | DIASTOLIC BLOOD PRESSURE: 62 MMHG | WEIGHT: 145 LBS | OXYGEN SATURATION: 100 % | HEART RATE: 94 BPM

## 2022-12-02 DIAGNOSIS — K74.60 CIRRHOSIS OF LIVER WITHOUT ASCITES, UNSPECIFIED HEPATIC CIRRHOSIS TYPE (HCC): Primary | ICD-10-CM

## 2022-12-02 DIAGNOSIS — E87.6 HYPOKALEMIA: ICD-10-CM

## 2022-12-02 DIAGNOSIS — I70.0 ATHEROSCLEROSIS OF ABDOMINAL AORTA (HCC): ICD-10-CM

## 2022-12-02 LAB
A/G RATIO: 0.8 (ref 1.1–2.2)
ALBUMIN SERPL-MCNC: 3.9 G/DL (ref 3.4–5)
ALP BLD-CCNC: 125 U/L (ref 40–129)
ALT SERPL-CCNC: 23 U/L (ref 10–40)
AMMONIA: 56 UMOL/L (ref 16–60)
ANION GAP SERPL CALCULATED.3IONS-SCNC: 14 MMOL/L (ref 3–16)
ANISOCYTOSIS: ABNORMAL
AST SERPL-CCNC: 50 U/L (ref 15–37)
ATYPICAL LYMPHOCYTE RELATIVE PERCENT: 1 % (ref 0–6)
BASOPHILS ABSOLUTE: 0 K/UL (ref 0–0.2)
BASOPHILS RELATIVE PERCENT: 0 %
BILIRUB SERPL-MCNC: 3.9 MG/DL (ref 0–1)
BILIRUBIN URINE: NEGATIVE
BLOOD, URINE: NEGATIVE
BUN BLDV-MCNC: 28 MG/DL (ref 7–20)
CALCIUM SERPL-MCNC: 10 MG/DL (ref 8.3–10.6)
CHLORIDE BLD-SCNC: 97 MMOL/L (ref 99–110)
CLARITY: CLEAR
CO2: 20 MMOL/L (ref 21–32)
COLOR: YELLOW
CREAT SERPL-MCNC: 0.9 MG/DL (ref 0.8–1.3)
EOSINOPHILS ABSOLUTE: 0.1 K/UL (ref 0–0.6)
EOSINOPHILS RELATIVE PERCENT: 2 %
GFR SERPL CREATININE-BSD FRML MDRD: >60 ML/MIN/{1.73_M2}
GLUCOSE BLD-MCNC: 93 MG/DL (ref 70–99)
GLUCOSE URINE: NEGATIVE MG/DL
HCT VFR BLD CALC: 28.9 % (ref 40.5–52.5)
HEMOGLOBIN: 9 G/DL (ref 13.5–17.5)
HYPOCHROMIA: ABNORMAL
INFLUENZA A: NOT DETECTED
INFLUENZA B: NOT DETECTED
KETONES, URINE: NEGATIVE MG/DL
LEUKOCYTE ESTERASE, URINE: NEGATIVE
LIPASE: 34 U/L (ref 13–60)
LYMPHOCYTES ABSOLUTE: 0.4 K/UL (ref 1–5.1)
LYMPHOCYTES RELATIVE PERCENT: 11 %
MCH RBC QN AUTO: 23.3 PG (ref 26–34)
MCHC RBC AUTO-ENTMCNC: 31.2 G/DL (ref 31–36)
MCV RBC AUTO: 74.6 FL (ref 80–100)
MICROSCOPIC EXAMINATION: NORMAL
MONOCYTES ABSOLUTE: 0.2 K/UL (ref 0–1.3)
MONOCYTES RELATIVE PERCENT: 7 %
NEUTROPHILS ABSOLUTE: 2.4 K/UL (ref 1.7–7.7)
NEUTROPHILS RELATIVE PERCENT: 79 %
NITRITE, URINE: NEGATIVE
PDW BLD-RTO: 22.5 % (ref 12.4–15.4)
PH UA: 7 (ref 5–8)
PLATELET # BLD: 100 K/UL (ref 135–450)
PLATELET SLIDE REVIEW: ABNORMAL
PMV BLD AUTO: 8.8 FL (ref 5–10.5)
POLYCHROMASIA: ABNORMAL
POTASSIUM SERPL-SCNC: 3.3 MMOL/L (ref 3.5–5.1)
PROTEIN UA: NEGATIVE MG/DL
RBC # BLD: 3.88 M/UL (ref 4.2–5.9)
SARS-COV-2 RNA, RT PCR: NOT DETECTED
SLIDE REVIEW: ABNORMAL
SODIUM BLD-SCNC: 131 MMOL/L (ref 136–145)
SPECIFIC GRAVITY UA: 1.01 (ref 1–1.03)
TARGET CELLS: ABNORMAL
TOTAL PROTEIN: 8.8 G/DL (ref 6.4–8.2)
TROPONIN: <0.01 NG/ML
URINE REFLEX TO CULTURE: NORMAL
URINE TYPE: NORMAL
UROBILINOGEN, URINE: 1 E.U./DL
WBC # BLD: 3.1 K/UL (ref 4–11)

## 2022-12-02 PROCEDURE — 80053 COMPREHEN METABOLIC PANEL: CPT

## 2022-12-02 PROCEDURE — 87636 SARSCOV2 & INF A&B AMP PRB: CPT

## 2022-12-02 PROCEDURE — 82140 ASSAY OF AMMONIA: CPT

## 2022-12-02 PROCEDURE — 74177 CT ABD & PELVIS W/CONTRAST: CPT

## 2022-12-02 PROCEDURE — 85025 COMPLETE CBC W/AUTO DIFF WBC: CPT

## 2022-12-02 PROCEDURE — 6360000004 HC RX CONTRAST MEDICATION: Performed by: EMERGENCY MEDICINE

## 2022-12-02 PROCEDURE — 83690 ASSAY OF LIPASE: CPT

## 2022-12-02 PROCEDURE — 36415 COLL VENOUS BLD VENIPUNCTURE: CPT

## 2022-12-02 PROCEDURE — 70450 CT HEAD/BRAIN W/O DYE: CPT

## 2022-12-02 PROCEDURE — 84484 ASSAY OF TROPONIN QUANT: CPT

## 2022-12-02 PROCEDURE — 99285 EMERGENCY DEPT VISIT HI MDM: CPT

## 2022-12-02 PROCEDURE — 6370000000 HC RX 637 (ALT 250 FOR IP): Performed by: EMERGENCY MEDICINE

## 2022-12-02 PROCEDURE — 81003 URINALYSIS AUTO W/O SCOPE: CPT

## 2022-12-02 RX ORDER — LACTULOSE 10 G/15ML
20 SOLUTION ORAL DAILY PRN
Qty: 300 ML | Refills: 1 | Status: SHIPPED | OUTPATIENT
Start: 2022-12-02

## 2022-12-02 RX ORDER — POTASSIUM CHLORIDE 20 MEQ/1
20 TABLET, EXTENDED RELEASE ORAL DAILY
Qty: 7 TABLET | Refills: 0 | Status: SHIPPED | OUTPATIENT
Start: 2022-12-02

## 2022-12-02 RX ORDER — GABAPENTIN 300 MG/1
300 CAPSULE ORAL 3 TIMES DAILY
COMMUNITY

## 2022-12-02 RX ORDER — SPIRONOLACTONE 100 MG/1
100 TABLET, FILM COATED ORAL DAILY
COMMUNITY

## 2022-12-02 RX ORDER — LACTULOSE 10 G/15ML
20 SOLUTION ORAL DAILY PRN
Qty: 300 ML | Refills: 1 | Status: SHIPPED | OUTPATIENT
Start: 2022-12-02 | End: 2022-12-02 | Stop reason: SDUPTHER

## 2022-12-02 RX ORDER — POTASSIUM CHLORIDE 20 MEQ/1
20 TABLET, EXTENDED RELEASE ORAL DAILY
Qty: 7 TABLET | Refills: 0 | Status: SHIPPED | OUTPATIENT
Start: 2022-12-02 | End: 2022-12-02 | Stop reason: SDUPTHER

## 2022-12-02 RX ORDER — POTASSIUM CHLORIDE 20 MEQ/1
20 TABLET, EXTENDED RELEASE ORAL ONCE
Status: COMPLETED | OUTPATIENT
Start: 2022-12-02 | End: 2022-12-02

## 2022-12-02 RX ADMIN — POTASSIUM CHLORIDE 20 MEQ: 1500 TABLET, EXTENDED RELEASE ORAL at 21:59

## 2022-12-02 RX ADMIN — IOPAMIDOL 75 ML: 755 INJECTION, SOLUTION INTRAVENOUS at 19:24

## 2022-12-02 ASSESSMENT — ENCOUNTER SYMPTOMS
PHOTOPHOBIA: 0
TROUBLE SWALLOWING: 0
SHORTNESS OF BREATH: 0
FACIAL SWELLING: 0
BLOOD IN STOOL: 0
BACK PAIN: 0
ABDOMINAL PAIN: 0
NAUSEA: 0
WHEEZING: 0
VOMITING: 0
STRIDOR: 0
VOICE CHANGE: 0
COLOR CHANGE: 1

## 2022-12-02 ASSESSMENT — PAIN - FUNCTIONAL ASSESSMENT
PAIN_FUNCTIONAL_ASSESSMENT: 0-10
PAIN_FUNCTIONAL_ASSESSMENT: NONE - DENIES PAIN

## 2022-12-02 ASSESSMENT — PAIN SCALES - GENERAL: PAINLEVEL_OUTOF10: 5

## 2022-12-02 ASSESSMENT — PAIN DESCRIPTION - LOCATION: LOCATION: ABDOMEN

## 2022-12-02 ASSESSMENT — PAIN DESCRIPTION - ORIENTATION: ORIENTATION: RIGHT

## 2022-12-02 NOTE — ED PROVIDER NOTES
[address] ED  eMERGENCY dEPARTMENT eNCOUnter      Pt Name: Joie Bravo  MRN: [id number]  [place] 1955  Date of evaluation: 12/2/2022  Provider: Marylu Nicole MD    CHIEF COMPLAINT       Chief Complaint   Patient presents with    Jaundice     Pt has hx of cirrhosis. Pt has increased confusion, yellow sclera, increased weakness. HISTORY OF PRESENT ILLNESS   (Location/Symptom, Timing/Onset, Context/Setting, Quality, Duration, Modifying Factors, Severity)  Note limiting factors. Joie Bravo is a 79 y.o. male with hx of cirrhosis of the liver and hepatitis C who presents due to 2 days of increased yellow sclera, confusion, and increased weakness diffusely. Patient's and her mother reports that the patient had difficulty putting on a shirt yesterday due to confusion and then also could not find his pills even though they were right in front of him. Patient's and her deny any fall, trauma, fever, cough or runny nose respiratory symptoms. Symptoms are moderate constant and unchanged with no known aggravating or alleviating factors. HPI    Nursing Notes were reviewed. REVIEW OFSYSTEMS    (2-9 systems for level 4, 10 or more for level 5)     Review of Systems   Constitutional:  Negative for appetite change, fever and unexpected weight change. HENT:  Negative for facial swelling, trouble swallowing and voice change. Eyes:  Negative for photophobia and visual disturbance. Respiratory:  Negative for shortness of breath, wheezing and stridor. Cardiovascular:  Negative for chest pain and palpitations. Gastrointestinal:  Negative for abdominal pain, blood in stool, nausea and vomiting. Genitourinary:  Negative for difficulty urinating and dysuria. Musculoskeletal:  Negative for back pain, gait problem and neck pain. Skin:  Positive for color change. Negative for wound. Neurological:  Negative for seizures, syncope and speech difficulty.    Psychiatric/Behavioral: Positive for confusion. Negative for self-injury and suicidal ideas. Except as noted above the remainder of the review of systems was reviewed and negative. PAST MEDICAL HISTORY     Past Medical History:   Diagnosis Date    Cirrhosis of liver ([address]     COPD (chronic obstructive pulmonary disease) (HCC)     Hepatitis C antibody positive in blood 11/16/2017    History of blood transfusion     MI (myocardial infarction) [facility])          SURGICAL HISTORY       Past Surgical History:   Procedure Laterality Date    CARDIAC CATHETERIZATION      heart cath    COLONOSCOPY  11/17/2017    Initial Colonoscopy; Multiple Colon Polyps    ENDOSCOPY, COLON, DIAGNOSTIC      EYE SURGERY      cataract removal    [place] NJX DX/THER SBST INTRLMNR CRV/THRC W/IMG GDN Right 8/13/2018    RIGHT CERVICAL SEVEN THORACIC ONE EPIDURAL STEROID INJECTION SITE CONFIRMED BY FLUOROSCOPY performed by Argelia Townsend MD at [address]  01/18/2018    Gastritis, [place]    UPPER GASTROINTESTINAL ENDOSCOPY N/A 10/11/2022    EGD CONTROL HEMORRHAGE performed by Andrzej Bardales MD at [address] [place]       Previous Medications    FERROUS SULFATE (FE TABS) 325 (65 FE) MG EC TABLET    Take 1 tablet by mouth daily (with breakfast)    FUROSEMIDE (LASIX) 20 MG TABLET    Take 1 tablet by mouth daily    GABAPENTIN (NEURONTIN) 300 MG CAPSULE    Take 300 mg by mouth 3 times daily.     PANTOPRAZOLE (PROTONIX) 40 MG TABLET    TAKE 1 TABLET BY MOUTH IN THE MORNING BEFORE BREAKFAST    SPIRONOLACTONE (ALDACTONE) 100 MG TABLET    Take 100 mg by mouth daily       ALLERGIES     Codeine    FAMILY HISTORY       Family History   Problem Relation Age of Onset    Other Mother     Cancer Mother     High Blood Pressure Father           SOCIAL HISTORY       Social History     Socioeconomic History    Marital status:      Spouse name: None    Number of children: None    Years of education: None    Highest education level: None   Tobacco Use    Smoking status: Every Day     Packs/day: 1.00     Years: 50.00     Pack years: 50.00     Types: Cigarettes    Smokeless tobacco: Never    Tobacco comments:     refused, is seeing primary physician. Vaping Use    Vaping Use: Never used   Substance and Sexual Activity    Alcohol use: Not Currently     Alcohol/week: 6.0 standard drinks     Types: 6 Cans of beer per week    Drug use: No         PHYSICAL EXAM    (up to 7 for level 4, 8 or more for level 5)     ED Triage Vitals [12/02/22 1445]   BP Temp Temp src Heart Rate Resp SpO2 Height Weight   122/70 97.7 °F (36.5 °C) -- (!) 107 15 99 % 5' 8\" (1.727 m) 145 lb (65.8 kg)       Physical Exam  Vitals and nursing note reviewed. Constitutional:       General: He is not in acute distress. Appearance: He is well-developed. HENT:      Head: Normocephalic and atraumatic. Right Ear: External ear normal.      Left Ear: External ear normal.   Eyes:      Conjunctiva/sclera: Conjunctivae normal.   Neck:      Vascular: No JVD. Trachea: No tracheal deviation. Cardiovascular:      Rate and Rhythm: Normal rate. Pulmonary:      Effort: Pulmonary effort is normal. No respiratory distress. Breath sounds: Normal breath sounds. No wheezing. Abdominal:      General: There is no distension. Palpations: Abdomen is soft. Tenderness: There is abdominal tenderness. There is no guarding or rebound. Comments: Mild diffuse abdominal tenderness with no focal area of tenderness. No rebound, guarding, peritoneal signs. Musculoskeletal:         General: No tenderness. Normal range of motion. Cervical back: Neck supple. Skin:     General: Skin is warm and dry. Neurological:      Mental Status: He is alert. Cranial Nerves: No cranial nerve deficit.        DIAGNOSTIC RESULTS         RADIOLOGY:     Interpretation per the Radiologist below, if available at the time of this note:    CT ABDOMEN PELVIS W IV CONTRAST Additional Contrast? None   Preliminary Result   1. No acute findings in the abdomen or pelvis. 2. Cirrhosis with gastroesophageal and splenic hilar varices. 3. Severe atherosclerotic plaque with areas of greater than 50% luminal   stenosis in the infrarenal abdominal aorta. CT HEAD WO CONTRAST   Final Result   No acute intracranial abnormality. ED BEDSIDE ULTRASOUND:   Performed by ED Physician - none    LABS:  Labs Reviewed   CBC WITH AUTO DIFFERENTIAL - Abnormal; Notable for the following components:       Result Value    WBC 3.1 (*)     RBC 3.88 (*)     Hemoglobin 9.0 (*)     Hematocrit 28.9 (*)     MCV 74.6 (*)     MCH 23.3 (*)     RDW 22.5 (*)     Platelets 043 (*)     Lymphocytes Absolute 0.4 (*)     Anisocytosis 1+ (*)     Polychromasia 1+ (*)     Hypochromia 2+ (*)     Target Cells 1+ (*)     All other components within normal limits   COMPREHENSIVE METABOLIC PANEL - Abnormal; Notable for the following components:    Sodium 131 (*)     Potassium 3.3 (*)     Chloride 97 (*)     CO2 20 (*)     BUN 28 (*)     Total Protein 8.8 (*)     Albumin/Globulin Ratio 0.8 (*)     Total Bilirubin 3.9 (*)     AST 50 (*)     All other components within normal limits   COVID-19 & INFLUENZA COMBO   TROPONIN   LIPASE   URINALYSIS WITH REFLEX TO CULTURE   AMMONIA       All otherlabs were within normal range or not returned as of this dictation. EMERGENCY DEPARTMENT COURSE and DIFFERENTIAL DIAGNOSIS/MDM:   Vitals:    Vitals:    12/02/22 2015 12/02/22 2030 12/02/22 2100 12/02/22 2115   BP:   117/79    Pulse: 98 97 97 94   Resp: 14 13 14 14   Temp:       SpO2:  98% 98%    Weight:       Height:             MDM  Patient is afebrile, nontoxic-appearing, in no acute distress. Laboratory evaluation shows mildly elevated ammonia at 56 however liver labs are fairly consistent with patient's chronic baseline. Head CT is unremarkable.   CT abdomen pelvis shows known cirrhosis with severe atherosclerotic plaque of the aortic aneurysm however does not show any acute emergent findings of the abdomen or pelvis. Patient is mild hypokalemia. I have discussed the findings with the patient and his wife and offered admission for lactulose. They report the patient is taking lactulose as needed and has only taken it once in the past week. I have recommended taking it daily to increase bowel movements and excrete ammonia. We have discussed the possibility of admission given confusion for lactulose administration however patient will status seems to have significant proved throughout his multiple hours today in the emergency department and the hospital is currently boarding me and the patient will stay the emergency room for prolonged period of time. The patient and wife politely declined admission at this time and report they feel comfortable giving the patient lactulose daily at home and bring him back to the emergency [facility] if he develops a fever or worsening mental status but right now the patient is oriented to person place time and situation and able to take p.o. medicines without any difficulty. Patient is given a refill of lactulose as well as a prescription for potassium and close primary care follow-up in the next 72 hours as recommended. Strict ER return precautions were again discussed. Patient and wife expressed understanding and agreement with this plan the patient is discharged home. Procedures    FINAL IMPRESSION      1. Cirrhosis of liver without ascites, unspecified hepatic cirrhosis type ([address]    2. Atherosclerosis of abdominal aorta ([address]    3.  Hypokalemia          DISPOSITION/PLAN   DISPOSITION Decision To Discharge 12/02/2022 09:29:34 PM      PATIENT REFERRED TO:  David Ulloa MD  [address] , [facility]  [phone]    In 3 days      [facility] ED  [address] [facility]  [phone]    If symptoms worsen    MEDICATIONS:  New Prescriptions    POTASSIUM CHLORIDE (KLOR-CON M) 20 MEQ EXTENDED RELEASE TABLET    Take 1 tablet by mouth daily          (Please note that portions of this note were completed with a voice recognition program.  Efforts were made to edit the dictations but occasionally words aremis-transcribed. )    Salina Santillan MD (electronically signed)  Attending Emergency Physician           Salina Santillan MD  12/02/22 0296

## 2022-12-05 ENCOUNTER — CARE COORDINATION (OUTPATIENT)
Dept: CARE COORDINATION | Age: 67
End: 2022-12-05

## 2022-12-06 ENCOUNTER — CARE COORDINATION (OUTPATIENT)
Dept: CARE COORDINATION | Age: 67
End: 2022-12-06

## 2022-12-12 ENCOUNTER — CARE COORDINATION (OUTPATIENT)
Dept: CARE COORDINATION | Age: 67
End: 2022-12-12

## 2022-12-12 DIAGNOSIS — K70.31 ALCOHOLIC CIRRHOSIS OF LIVER WITH ASCITES (HCC): Primary | ICD-10-CM

## 2022-12-12 NOTE — CARE COORDINATION
Patient Excluded from Care Coordination? Yes     The patient will be excluded from Care Coordination for the following reason: Patient unable to contact to enroll and no working phone numbers listed in contacts.

## 2022-12-20 ENCOUNTER — HOSPITAL ENCOUNTER (OUTPATIENT)
Age: 67
Discharge: HOME | End: 2022-12-20
Payer: MEDICAID

## 2022-12-20 DIAGNOSIS — K70.31 ALCOHOLIC CIRRHOSIS OF LIVER WITH ASCITES (HCC): ICD-10-CM

## 2022-12-20 PROCEDURE — 36415 COLL VENOUS BLD VENIPUNCTURE: CPT

## 2022-12-20 PROCEDURE — 80048 BASIC METABOLIC PNL TOTAL CA: CPT

## 2022-12-21 LAB
ANION GAP SERPL CALCULATED.3IONS-SCNC: 14 MMOL/L (ref 3–16)
BUN BLDV-MCNC: 18 MG/DL (ref 7–20)
CALCIUM SERPL-MCNC: 9.8 MG/DL (ref 8.3–10.6)
CHLORIDE BLD-SCNC: 100 MMOL/L (ref 99–110)
CO2: 21 MMOL/L (ref 21–32)
CREAT SERPL-MCNC: 0.6 MG/DL (ref 0.8–1.3)
GFR SERPL CREATININE-BSD FRML MDRD: >60 ML/MIN/{1.73_M2}
GLUCOSE BLD-MCNC: 89 MG/DL (ref 70–99)
POTASSIUM SERPL-SCNC: 3.6 MMOL/L (ref 3.5–5.1)
SODIUM BLD-SCNC: 135 MMOL/L (ref 136–145)

## 2023-01-16 DIAGNOSIS — R10.11 RUQ PAIN: Primary | ICD-10-CM

## 2023-02-08 ENCOUNTER — TELEPHONE (OUTPATIENT)
Dept: INTERNAL MEDICINE CLINIC | Age: 68
End: 2023-02-08

## 2023-02-08 RX ORDER — ALBUTEROL SULFATE 90 UG/1
2 AEROSOL, METERED RESPIRATORY (INHALATION) EVERY 6 HOURS PRN
Qty: 18 G | Refills: 0 | Status: SHIPPED | OUTPATIENT
Start: 2023-02-08

## 2023-02-08 NOTE — TELEPHONE ENCOUNTER
----- Message from Mechelle Harper MD sent at 2/8/2023  4:43 PM EST -----  Contact: Mendy Geiger [phone]  Can wait for Dr. Shad Grove    ----- Message -----  From: Moon Hugo  Sent: 2/8/2023   3:30 PM EST  To: Mechelle Harper MD    Patient requesting a refill but asking if he could try Trelegy.      albuterol-ipratropium (COMBIVENT RESPIMAT)  MCG/ACT AERS inhaler     [place] [address]

## 2023-02-08 NOTE — TELEPHONE ENCOUNTER
Pt informed. States he cannot wait until Monday. Per Dr. Sofía Roman- Albuterol inhaler 2 puffs q6h prn. Pt informed.

## 2023-02-16 DIAGNOSIS — K70.30 ALCOHOLIC CIRRHOSIS OF LIVER WITHOUT ASCITES (HCC): Primary | ICD-10-CM

## 2023-02-28 ENCOUNTER — OFFICE VISIT (OUTPATIENT)
Dept: INTERNAL MEDICINE CLINIC | Age: 68
End: 2023-02-28

## 2023-02-28 VITALS
HEART RATE: 70 BPM | BODY MASS INDEX: 21.67 KG/M2 | RESPIRATION RATE: 12 BRPM | HEIGHT: 68 IN | DIASTOLIC BLOOD PRESSURE: 80 MMHG | WEIGHT: 143 LBS | SYSTOLIC BLOOD PRESSURE: 130 MMHG

## 2023-02-28 DIAGNOSIS — D69.6 THROMBOCYTOPENIA (HCC): ICD-10-CM

## 2023-02-28 DIAGNOSIS — J44.9 CHRONIC OBSTRUCTIVE PULMONARY DISEASE, UNSPECIFIED COPD TYPE (HCC): ICD-10-CM

## 2023-02-28 DIAGNOSIS — I47.1 SUPRAVENTRICULAR TACHYCARDIA (HCC): ICD-10-CM

## 2023-02-28 DIAGNOSIS — K74.60 CIRRHOSIS OF LIVER WITHOUT ASCITES, UNSPECIFIED HEPATIC CIRRHOSIS TYPE (HCC): ICD-10-CM

## 2023-02-28 DIAGNOSIS — I70.0 ATHEROSCLEROSIS OF ABDOMINAL AORTA (HCC): ICD-10-CM

## 2023-02-28 DIAGNOSIS — R56.9 SEIZURE (HCC): Primary | ICD-10-CM

## 2023-02-28 DIAGNOSIS — D61.818 PANCYTOPENIA (HCC): ICD-10-CM

## 2023-02-28 PROCEDURE — 99215 OFFICE O/P EST HI 40 MIN: CPT | Performed by: INTERNAL MEDICINE

## 2023-02-28 PROCEDURE — 1123F ACP DISCUSS/DSCN MKR DOCD: CPT | Performed by: INTERNAL MEDICINE

## 2023-02-28 RX ORDER — TRAZODONE HYDROCHLORIDE 50 MG/1
50 TABLET ORAL NIGHTLY
Qty: 90 TABLET | Refills: 1 | Status: SHIPPED | OUTPATIENT
Start: 2023-02-28

## 2023-02-28 RX ORDER — FLUTICASONE FUROATE, UMECLIDINIUM BROMIDE AND VILANTEROL TRIFENATATE 200; 62.5; 25 UG/1; UG/1; UG/1
1 POWDER RESPIRATORY (INHALATION) DAILY
Qty: 30 EACH | Refills: 2 | Status: SHIPPED | OUTPATIENT
Start: 2023-02-28

## 2023-02-28 ASSESSMENT — ENCOUNTER SYMPTOMS
BACK PAIN: 0
ABDOMINAL PAIN: 0
RHINORRHEA: 0
WHEEZING: 0
SHORTNESS OF BREATH: 0
VOMITING: 0
NAUSEA: 0

## 2023-02-28 NOTE — PROGRESS NOTES
Subjective:      Patient ID: Mackenzie Mendoza is a 79 y.o. male. HPI    Patient is here for follow-up. I have not seen the patient since May of last year. He has a history of advanced liver disease. He has a history of hepatitis C. He has a history of alcoholic cirrhosis. He has a history of esophagitis. He has seen GI. He is taking lactulose, Lasix and Aldactone. He also has esophageal varices and takes Protonix. Patient has a history of seizure disorder. He had a seizure in September 2020. This is related to alcohol withdrawal.  He had a normal MRI of the brain and EEG. His significant other states that he has been having seizures. About 2 weeks ago she found him laying down over the dog's bed. Apparently was incontinent of urine. He does not remember this incident. Patient is not a good historian. No head trauma. He is not on any antiseizure medication. Patient has had trouble sleeping. He continues to smoke. He has COPD. He has some trouble breathing. He tried albuterol without much help. Review of Systems   Constitutional:  Negative for activity change and appetite change. HENT:  Negative for postnasal drip and rhinorrhea. Respiratory:  Negative for shortness of breath and wheezing. Cardiovascular:  Negative for chest pain, palpitations and leg swelling. Gastrointestinal:  Negative for abdominal pain, nausea and vomiting. Genitourinary:  Negative for difficulty urinating and frequency. Musculoskeletal:  Negative for back pain and joint swelling. Skin:  Negative for rash. Neurological:  Positive for seizures. Negative for light-headedness. Psychiatric/Behavioral:  Negative for sleep disturbance.         Past Medical History:   Diagnosis Date    Cirrhosis of liver (HCC)     COPD (chronic obstructive pulmonary disease) (HCC)     Hepatitis C antibody positive in blood 11/16/2017    History of blood transfusion     MI (my[facility] infarction[facility]        Past Surgical History:   Procedure Laterality Date    CARDIAC CATHETERIZATION      heart cath    COLONOSCOPY  11/17/2017    Initial Colonoscopy; Multiple Colon Polyps    ENDOSCOPY, COLON, DIAGNOSTIC      EYE SURGERY      cataract removal    [place] NJX DX/THER SBST INTRLMNR CRV/THRC W/IMG GDN Right 8/13/2018    RIGHT CERVICAL SEVEN THORACIC ONE EPIDURAL STEROID INJECTION SITE CONFIRMED BY FLUOROSCOPY performed by Moshe Coleman MD at [place] [address]  01/18/2018    Gastritis, [place]    UPPER GASTROINTESTINAL ENDOSCOPY N/A 10/11/2022    EGD CONTROL HEMORRHAGE performed by Sanchez Lee MD at [facility]         Family History   Problem Relation Age of Onset    Other Mother     Cancer Mother     High Blood Pressure Father          Social History     Tobacco Use    Smoking status: Every Day     Packs/day: 1.00     Years: 50.00     Pack years: 50.00     Types: Cigarettes    Smokeless tobacco: Never    Tobacco comments:     refused, is seeing primary physician.     Vaping Use    Vaping Use: Never used   Substance Use Topics    Alcohol use: Not Currently     Alcohol/week: 6.0 standard drinks     Types: 6 Cans of beer per week    Drug use: No         Current Outpatient Medications   Medication Instructions    albuterol sulfate HFA (VENTOLIN HFA) 108 (90 Base) MCG/ACT inhaler 2 puffs, Inhalation, EVERY 6 HOURS PRN    ferrous sulfate (FE TABS) 325 mg, Oral, DAILY WITH BREAKFAST    furosemide (LASIX) 20 mg, Oral, DAILY    Handicap Placard MISC Does not apply, Valid for five years    lactulose (CHRONULAC) 20 g, Oral, DAILY PRN    pantoprazole (PROTONIX) 40 MG tablet TAKE 1 TABLET BY MOUTH IN THE MORNING BEFORE BREAKFAST    spironolactone (ALDACTONE) 100 mg, Oral, DAILY       /80 (Site: Right Upper Arm, Position: Sitting, Cuff Size: Medium Adult)   Pulse 70   Resp 12   Ht 5' 8\" (1.727 m)   Wt 143 lb (64.9 kg)   BMI 21.74 kg/m²        Objective:   Physical Exam  Constitutional:       Appearance: He is well-developed. HENT:      Head: Normocephalic. Eyes:      Conjunctiva/sclera: Conjunctivae normal.      Pupils: Pupils are equal, round, and reactive to light. Neck:      Thyroid: No thyroid mass or thyromegaly. Vascular: No carotid bruit or JVD. Trachea: Trachea normal.   Cardiovascular:      Rate and Rhythm: Normal rate and regular rhythm. Heart sounds: Normal heart sounds. No murmur heard. No gallop. Pulmonary:      Effort: Pulmonary effort is normal. No respiratory distress. Breath sounds: Normal breath sounds. No wheezing or rales. Abdominal:      General: Bowel sounds are normal. There is no distension. Palpations: Abdomen is soft. There is no hepatomegaly, splenomegaly or mass. Tenderness: There is no abdominal tenderness. Musculoskeletal:         General: Normal range of motion. Cervical back: Normal range of motion and neck supple. Lymphadenopathy:      Cervical: No cervical adenopathy. Skin:     General: Skin is warm and dry. Findings: No rash. Neurological:      Mental Status: He is alert and oriented to person, place, and time. Cranial Nerves: No cranial nerve deficit. Deep Tendon Reflexes: Reflexes are normal and symmetric. Psychiatric:         Behavior: Behavior normal.         Thought Content: Thought content normal.         Judgment: Judgment normal.       Assessment:       Diagnosis Orders   1. Seizure ([address]  EEG awake and drowsy    MRI BRAIN W WO CONTRAST      2. Atherosclerosis of abdominal aorta (HCC)        3. Pancytopenia ([address]        4. Cirrhosis of liver without ascites, unspecified hepatic cirrhosis type ([address]        5. Supraventricular tachycardia ([address]        6. Thrombocytopenia ([address]        7. Chronic obstructive pulmonary disease, unspecified COPD type ([address]               Plan:      #Seizure disorder. He has a prior history of alcohol withdrawal seizure.   His significant other describes episodes that could be new onset seizure. I ordered an MRI of the brain and EEG. Patient is not a very good historian. He will need a neurology referral.    #Advanced alcoholic liver disease. Continue with Aldactone and Lasix. #Esophageal varices. On PPI. #COPD suspected. Start Trelegy 200 once a day. I gave him a sample. #Primary insomnia.  Start Trazodone 50 mg po qhs        Rafael Hernandez MD

## 2023-03-06 DIAGNOSIS — K70.30 ALCOHOLIC CIRRHOSIS OF LIVER WITHOUT ASCITES (HCC): ICD-10-CM

## 2023-03-07 LAB
ALBUMIN SERPL-MCNC: 3.7 G/DL (ref 3.4–5)
ALP BLD-CCNC: 107 U/L (ref 40–129)
ALT SERPL-CCNC: 17 U/L (ref 10–40)
ANION GAP SERPL CALCULATED.3IONS-SCNC: 16 MMOL/L (ref 3–16)
AST SERPL-CCNC: 33 U/L (ref 15–37)
BILIRUB SERPL-MCNC: 2 MG/DL (ref 0–1)
BILIRUBIN DIRECT: 1.1 MG/DL (ref 0–0.3)
BILIRUBIN, INDIRECT: 0.9 MG/DL (ref 0–1)
BUN BLDV-MCNC: 19 MG/DL (ref 7–20)
CALCIUM SERPL-MCNC: 9.8 MG/DL (ref 8.3–10.6)
CHLORIDE BLD-SCNC: 101 MMOL/L (ref 99–110)
CO2: 20 MMOL/L (ref 21–32)
CREAT SERPL-MCNC: 0.8 MG/DL (ref 0.8–1.3)
GFR SERPL CREATININE-BSD FRML MDRD: >60 ML/MIN/{1.73_M2}
GLUCOSE BLD-MCNC: 174 MG/DL (ref 70–99)
INR BLD: 1.42 (ref 0.87–1.14)
POTASSIUM SERPL-SCNC: 3.7 MMOL/L (ref 3.5–5.1)
PROTHROMBIN TIME: 17.3 SEC (ref 11.7–14.5)
SODIUM BLD-SCNC: 137 MMOL/L (ref 136–145)
TOTAL PROTEIN: 7.5 G/DL (ref 6.4–8.2)

## 2023-03-20 ENCOUNTER — OFFICE VISIT (OUTPATIENT)
Dept: INTERNAL MEDICINE CLINIC | Age: 68
End: 2023-03-20

## 2023-03-20 ENCOUNTER — TELEPHONE (OUTPATIENT)
Dept: INTERNAL MEDICINE CLINIC | Age: 68
End: 2023-03-20

## 2023-03-20 VITALS
HEIGHT: 68 IN | SYSTOLIC BLOOD PRESSURE: 118 MMHG | BODY MASS INDEX: 20.92 KG/M2 | RESPIRATION RATE: 14 BRPM | WEIGHT: 138 LBS | DIASTOLIC BLOOD PRESSURE: 70 MMHG | HEART RATE: 80 BPM

## 2023-03-20 DIAGNOSIS — J44.9 CHRONIC OBSTRUCTIVE PULMONARY DISEASE, UNSPECIFIED COPD TYPE (HCC): ICD-10-CM

## 2023-03-20 DIAGNOSIS — R56.9 SEIZURE (HCC): Primary | ICD-10-CM

## 2023-03-20 PROCEDURE — G8420 CALC BMI NORM PARAMETERS: HCPCS | Performed by: NURSE PRACTITIONER

## 2023-03-20 PROCEDURE — 1123F ACP DISCUSS/DSCN MKR DOCD: CPT | Performed by: NURSE PRACTITIONER

## 2023-03-20 PROCEDURE — G8427 DOCREV CUR MEDS BY ELIG CLIN: HCPCS | Performed by: NURSE PRACTITIONER

## 2023-03-20 PROCEDURE — 3023F SPIROM DOC REV: CPT | Performed by: NURSE PRACTITIONER

## 2023-03-20 PROCEDURE — 4004F PT TOBACCO SCREEN RCVD TLK: CPT | Performed by: NURSE PRACTITIONER

## 2023-03-20 PROCEDURE — 3017F COLORECTAL CA SCREEN DOC REV: CPT | Performed by: NURSE PRACTITIONER

## 2023-03-20 PROCEDURE — 99213 OFFICE O/P EST LOW 20 MIN: CPT | Performed by: NURSE PRACTITIONER

## 2023-03-20 PROCEDURE — G8484 FLU IMMUNIZE NO ADMIN: HCPCS | Performed by: NURSE PRACTITIONER

## 2023-03-20 ASSESSMENT — ENCOUNTER SYMPTOMS
COUGH: 1
ABDOMINAL PAIN: 1
SHORTNESS OF BREATH: 1
DIARRHEA: 0

## 2023-03-20 NOTE — TELEPHONE ENCOUNTER
----- Message from Mac Do MD sent at 3/20/2023  1:05 PM EDT -----  Contact: Patient [phone]  Who ordered the labs  ----- Message -----  From: Zoë Ayala  Sent: 3/20/2023  12:17 PM EDT  To: Mac Do MD    Pt wondering if he should make an appointment after his lab ob the 28th. Please advise.

## 2023-03-20 NOTE — PROGRESS NOTES
Chief Complaint:   Kinga Connolly is a 79 y.o. male who presents for   Chief Complaint   Patient presents with    Follow-up      HPI    Patient presents to the office today for follow up. Wanting to see if we can move the date up on the MRI, EEG. He gets up and falls. He gets dizzy and has seizures. Has an MRI and EEG scheduled. Saw Dr. Quintin Shah  He doesn't want to put him under until after neuro workup is done. Last weekend he had 2 seizures Sunday. Some shortness of breath, cough. Still smoking, but is cutting back  He has used Trelegy a few times. Review of Systems  Review of Systems   Respiratory:  Positive for cough and shortness of breath. Gastrointestinal:  Positive for abdominal pain. Negative for diarrhea.        Allergies  Codeine      Vitals  /70 (Site: Right Upper Arm, Position: Sitting)   Pulse 80   Resp 14   Ht 5' 8\" (1.727 m)   Wt 138 lb (62.6 kg)   BMI 20.98 kg/m²     Current Medications  Current Outpatient Medications   Medication Sig Dispense Refill    Handicap Placard MISC by Does not apply route Valid for five years 1 each 0    fluticasone-umeclidin-vilant (TRELEGY ELLIPTA) 200-62.5-25 MCG/ACT AEPB inhaler Inhale 1 puff into the lungs daily 30 each 2    traZODone (DESYREL) 50 MG tablet Take 1 tablet by mouth nightly 90 tablet 1    albuterol sulfate HFA (VENTOLIN HFA) 108 (90 Base) MCG/ACT inhaler Inhale 2 puffs into the lungs every 6 hours as needed for Wheezing or Shortness of Breath 18 g 0    spironolactone (ALDACTONE) 100 MG tablet Take 100 mg by mouth daily      lactulose (CHRONULAC) 10 GM/15ML solution Take 30 mLs by mouth daily as needed (constipation) 300 mL 1    furosemide (LASIX) 20 MG tablet Take 1 tablet by mouth daily (Patient taking differently: Take 40 mg by mouth daily) 30 tablet 0    ferrous sulfate (FE TABS) 325 (65 Fe) MG EC tablet Take 1 tablet by mouth daily (with breakfast) 30 tablet 1    pantoprazole (PROTONIX) 40 MG tablet TAKE 1 TABLET BY MOUTH

## 2023-03-21 ENCOUNTER — TELEPHONE (OUTPATIENT)
Dept: INTERNAL MEDICINE CLINIC | Age: 68
End: 2023-03-21

## 2023-03-21 NOTE — TELEPHONE ENCOUNTER
----- Message from [facility] sent at 3/21/2023  1:48 PM EDT -----  Contact: Patient [phone]  Follow up with Dr. Blanca Yeager per Dr Roque Garcia  ----- Message -----  From: [facility]  Sent: 3/21/2023  11:01 AM EDT  To: MD Dr. Blanca Mccoy. Patient wanting to know if you want to see him after his MRI on the 28th?  ----- Message -----  From: Matthew Ceja MD  Sent: 3/20/2023   1:05 PM EDT  To: [facility]    Who ordered the labs  ----- Message -----  From: Cheryl Peng  Sent: 3/20/2023  12:17 PM EDT  To: Matthew Ceja MD    Pt wondering if he should make an appointment after his lab ob the 28th. Please advise.

## 2023-03-21 NOTE — TELEPHONE ENCOUNTER
----- Message from Arelis Ordaz MD sent at 3/20/2023  1:05 PM EDT -----  Contact: Patient [phone]  Who ordered the labs  ----- Message -----  From: Gretchen Rico  Sent: 3/20/2023  12:17 PM EDT  To: Arelis Ordaz MD    Pt wondering if he should make an appointment after his lab ob the 28th. Please advise.

## 2023-03-28 ENCOUNTER — HOSPITAL ENCOUNTER (OUTPATIENT)
Dept: MRI IMAGING | Age: 68
Discharge: HOME | End: 2023-03-28
Payer: MEDICARE

## 2023-03-28 DIAGNOSIS — R56.9 SEIZURE (HCC): ICD-10-CM

## 2023-03-28 PROCEDURE — A9579 GAD-BASE MR CONTRAST NOS,1ML: HCPCS | Performed by: INTERNAL MEDICINE

## 2023-03-28 PROCEDURE — 6360000004 HC RX CONTRAST MEDICATION: Performed by: INTERNAL MEDICINE

## 2023-03-28 PROCEDURE — 70553 MRI BRAIN STEM W/O & W/DYE: CPT

## 2023-03-28 RX ADMIN — GADOTERIDOL 10 ML: 279.3 INJECTION, SOLUTION INTRAVENOUS at 15:46

## 2023-03-29 ENCOUNTER — TELEPHONE (OUTPATIENT)
Dept: INTERNAL MEDICINE CLINIC | Age: 68
End: 2023-03-29

## 2023-03-29 NOTE — TELEPHONE ENCOUNTER
----- Message from Saint Liner, MD sent at 3/29/2023  2:53 PM EDT -----  Contact: [phone]  Yes ok  ----- Message -----  From: Clearnce Riedel  Sent: 3/29/2023   2:50 PM EDT  To: Saint Liner, MD    Pt is scheduled to have an EEG on 4/7/23, he was informed by scheduling to not take any medications prior to his scan. Pt wants to know if it is okay for him not to take the lactulose until after the scan? Please advise.

## 2023-04-07 ENCOUNTER — HOSPITAL ENCOUNTER (OUTPATIENT)
Dept: NEUROLOGY | Age: 68
Discharge: HOME | End: 2023-04-07
Payer: MEDICARE

## 2023-04-07 DIAGNOSIS — R56.9 SEIZURE (HCC): ICD-10-CM

## 2023-04-07 PROCEDURE — 95816 EEG AWAKE AND DROWSY: CPT

## 2023-04-07 NOTE — PROCEDURES
Patient: Benton Caballero    MR Number: [id number]  YOB: 1955  Date of Visit: 4/7/2023    Clinical History:  The patient is a 79y.o. years old male with possible new onset seizure      Method: The EEG was performed utilizing the international 10/20 of electrode placements of both referential and bipolar montages. The patient was awake and drowsy through out the recording. Photic stimulation was performed. Findings: The background of the EEG showed normal alpha posterior background of -8- HZ and amplitude of 20-40 UV. This background was symmetric, waxing and waning, and not reactive. As the patient became drowsy, generalized diffuse slowing was seen through recording at 6-7 HZ. This generalized slowing was symmetric, non rhythmical, and continuous. No spike or sharp waves were seen. Photic stimulation did not activate EEG. Impression: This EEG  is within normal limits. There is no evidence of epileptiform discharges, focal, or lateralizing abnormalities.       Lety Fiore MD      Board certified in clinical neurophysiology

## 2023-04-08 ENCOUNTER — APPOINTMENT (OUTPATIENT)
Dept: CT IMAGING | Age: 68
End: 2023-04-08
Payer: MEDICARE

## 2023-04-08 ENCOUNTER — APPOINTMENT (OUTPATIENT)
Dept: GENERAL RADIOLOGY | Age: 68
End: 2023-04-08
Payer: MEDICARE

## 2023-04-08 ENCOUNTER — HOSPITAL ENCOUNTER (INPATIENT)
Age: 68
LOS: 1 days | Discharge: ANOTHER HOSPITAL | End: 2023-04-08
Attending: EMERGENCY MEDICINE | Admitting: HOSPITALIST
Payer: MEDICARE

## 2023-04-08 ENCOUNTER — HOSPITAL ENCOUNTER (INPATIENT)
Age: 68
LOS: 1 days | Discharge: ANOTHER HOSPITAL | DRG: 871 | End: 2023-04-08
Attending: INTERNAL MEDICINE | Admitting: INTERNAL MEDICINE
Payer: MEDICARE

## 2023-04-08 VITALS
WEIGHT: 147.71 LBS | BODY MASS INDEX: 22.39 KG/M2 | TEMPERATURE: 99.3 F | HEART RATE: 144 BPM | DIASTOLIC BLOOD PRESSURE: 86 MMHG | HEIGHT: 68 IN | RESPIRATION RATE: 27 BRPM | OXYGEN SATURATION: 100 % | SYSTOLIC BLOOD PRESSURE: 117 MMHG

## 2023-04-08 VITALS
HEART RATE: 113 BPM | DIASTOLIC BLOOD PRESSURE: 57 MMHG | SYSTOLIC BLOOD PRESSURE: 84 MMHG | OXYGEN SATURATION: 100 % | TEMPERATURE: 100.4 F | WEIGHT: 147.71 LBS | HEIGHT: 68 IN | BODY MASS INDEX: 22.39 KG/M2 | RESPIRATION RATE: 20 BRPM

## 2023-04-08 DIAGNOSIS — J18.9 COMMUNITY ACQUIRED PNEUMONIA, UNSPECIFIED LATERALITY: Primary | ICD-10-CM

## 2023-04-08 DIAGNOSIS — J96.01 ACUTE RESPIRATORY FAILURE WITH HYPOXIA (HCC): ICD-10-CM

## 2023-04-08 DIAGNOSIS — J96.01 SEPSIS WITH ACUTE HYPOXIC RESPIRATORY FAILURE WITHOUT SEPTIC SHOCK, DUE TO UNSPECIFIED ORGANISM (HCC): ICD-10-CM

## 2023-04-08 DIAGNOSIS — I48.91 ATRIAL FIBRILLATION WITH RVR (HCC): ICD-10-CM

## 2023-04-08 DIAGNOSIS — D64.9 ANEMIA, UNSPECIFIED TYPE: ICD-10-CM

## 2023-04-08 DIAGNOSIS — R60.0 HAND EDEMA: ICD-10-CM

## 2023-04-08 DIAGNOSIS — A41.9 SEPSIS WITH ACUTE HYPOXIC RESPIRATORY FAILURE WITHOUT SEPTIC SHOCK, DUE TO UNSPECIFIED ORGANISM (HCC): ICD-10-CM

## 2023-04-08 DIAGNOSIS — R65.20 SEPSIS WITH ACUTE HYPOXIC RESPIRATORY FAILURE WITHOUT SEPTIC SHOCK, DUE TO UNSPECIFIED ORGANISM (HCC): ICD-10-CM

## 2023-04-08 PROBLEM — I99.8 ISCHEMIA OF DIGITS OF HAND: Status: ACTIVE | Noted: 2023-04-08

## 2023-04-08 PROBLEM — I96 GANGRENE OF FINGER OF RIGHT HAND (HCC): Status: ACTIVE | Noted: 2023-04-08

## 2023-04-08 PROBLEM — K76.82 HEPATIC ENCEPHALOPATHY (HCC): Status: ACTIVE | Noted: 2023-04-08

## 2023-04-08 PROBLEM — I5A NONISCHEMIC NONTRAUMATIC MYOCARDIAL INJURY: Status: ACTIVE | Noted: 2023-04-08

## 2023-04-08 LAB
ABO + RH BLD: NORMAL
ABO + RH BLD: NORMAL
ALBUMIN SERPL-MCNC: 2.6 G/DL (ref 3.4–5)
ALBUMIN SERPL-MCNC: 2.7 G/DL (ref 3.4–5)
ALBUMIN SERPL-MCNC: 2.7 G/DL (ref 3.4–5)
ALBUMIN SERPL-MCNC: 3 G/DL (ref 3.4–5)
ALBUMIN/GLOB SERPL: 0.8 {RATIO} (ref 1.1–2.2)
ALBUMIN/GLOB SERPL: 0.9 {RATIO} (ref 1.1–2.2)
ALP SERPL-CCNC: 43 U/L (ref 40–129)
ALP SERPL-CCNC: 65 U/L (ref 40–129)
ALT SERPL-CCNC: 49 U/L (ref 10–40)
ALT SERPL-CCNC: 56 U/L (ref 10–40)
AMMONIA PLAS-SCNC: 23 UMOL/L (ref 16–60)
AMMONIA PLAS-SCNC: 44 UMOL/L (ref 16–60)
ANION GAP SERPL CALCULATED.3IONS-SCNC: 13 MMOL/L (ref 3–16)
ANION GAP SERPL CALCULATED.3IONS-SCNC: 15 MMOL/L (ref 3–16)
ANION GAP SERPL CALCULATED.3IONS-SCNC: 16 MMOL/L (ref 3–16)
APTT BLD: 45.2 SEC (ref 22.7–35.9)
AST SERPL-CCNC: 166 U/L (ref 15–37)
AST SERPL-CCNC: 204 U/L (ref 15–37)
BACTERIA URNS QL MICRO: ABNORMAL /HPF
BASE EXCESS BLDV CALC-SCNC: -11.8 MMOL/L (ref -3–3)
BASOPHILS # BLD: 0 K/UL (ref 0–0.2)
BASOPHILS # BLD: 0 K/UL (ref 0–0.2)
BASOPHILS NFR BLD: 0.6 %
BASOPHILS NFR BLD: 0.6 %
BILIRUB SERPL-MCNC: 3.5 MG/DL (ref 0–1)
BILIRUB SERPL-MCNC: 4.2 MG/DL (ref 0–1)
BILIRUB UR QL STRIP.AUTO: ABNORMAL
BLD GP AB SCN SERPL QL: NORMAL
BLD GP AB SCN SERPL QL: NORMAL
BLOOD BANK DISPENSE STATUS: NORMAL
BLOOD BANK DISPENSE STATUS: NORMAL
BLOOD BANK PRODUCT CODE: NORMAL
BLOOD BANK PRODUCT CODE: NORMAL
BPU ID: NORMAL
BPU ID: NORMAL
BUN SERPL-MCNC: 28 MG/DL (ref 7–20)
BUN SERPL-MCNC: 29 MG/DL (ref 7–20)
BUN SERPL-MCNC: 30 MG/DL (ref 7–20)
CALCIUM SERPL-MCNC: 7 MG/DL (ref 8.3–10.6)
CALCIUM SERPL-MCNC: 7.3 MG/DL (ref 8.3–10.6)
CALCIUM SERPL-MCNC: 8.1 MG/DL (ref 8.3–10.6)
CHLORIDE SERPL-SCNC: 102 MMOL/L (ref 99–110)
CHLORIDE SERPL-SCNC: 104 MMOL/L (ref 99–110)
CHLORIDE SERPL-SCNC: 104 MMOL/L (ref 99–110)
CHLORIDE SERPL-SCNC: 105 MMOL/L (ref 99–110)
CHLORIDE SERPL-SCNC: 99 MMOL/L (ref 99–110)
CK SERPL-CCNC: 1426 U/L (ref 39–308)
CLARITY UR: CLEAR
CO2 BLDV-SCNC: 13 MMOL/L
CO2 SERPL-SCNC: 11 MMOL/L (ref 21–32)
CO2 SERPL-SCNC: 12 MMOL/L (ref 21–32)
CO2 SERPL-SCNC: 12 MMOL/L (ref 21–32)
CO2 SERPL-SCNC: 13 MMOL/L (ref 21–32)
CO2 SERPL-SCNC: 15 MMOL/L (ref 21–32)
COHGB MFR BLDV: 9.7 % (ref 0–1.5)
COLOR UR: YELLOW
CREAT SERPL-MCNC: 1 MG/DL (ref 0.8–1.3)
CREAT SERPL-MCNC: 1 MG/DL (ref 0.8–1.3)
CREAT SERPL-MCNC: 1.1 MG/DL (ref 0.8–1.3)
CREAT SERPL-MCNC: 1.2 MG/DL (ref 0.8–1.3)
CREAT SERPL-MCNC: 1.3 MG/DL (ref 0.8–1.3)
DEPRECATED RDW RBC AUTO: 21.2 % (ref 12.4–15.4)
DEPRECATED RDW RBC AUTO: 23.6 % (ref 12.4–15.4)
DESCRIPTION BLOOD BANK: NORMAL
DESCRIPTION BLOOD BANK: NORMAL
EKG ATRIAL RATE: 127 BPM
EKG DIAGNOSIS: NORMAL
EKG Q-T INTERVAL: 288 MS
EKG QRS DURATION: 94 MS
EKG QTC CALCULATION (BAZETT): 461 MS
EKG R AXIS: -46 DEGREES
EKG T AXIS: 97 DEGREES
EKG VENTRICULAR RATE: 154 BPM
EOSINOPHIL # BLD: 0 K/UL (ref 0–0.6)
EOSINOPHIL # BLD: 0 K/UL (ref 0–0.6)
EOSINOPHIL NFR BLD: 0 %
EOSINOPHIL NFR BLD: 0.2 %
EPI CELLS #/AREA URNS HPF: ABNORMAL /HPF (ref 0–5)
ETHANOLAMINE SERPL-MCNC: NORMAL MG/DL (ref 0–0.08)
FIBRINOGEN PPP-MCNC: 281 MG/DL (ref 243–550)
GFR SERPLBLD CREATININE-BSD FMLA CKD-EPI: 60 ML/MIN/{1.73_M2}
GFR SERPLBLD CREATININE-BSD FMLA CKD-EPI: >60 ML/MIN/{1.73_M2}
GLUCOSE BLD-MCNC: 79 MG/DL (ref 70–99)
GLUCOSE BLD-MCNC: 80 MG/DL (ref 70–99)
GLUCOSE SERPL-MCNC: 100 MG/DL (ref 70–99)
GLUCOSE SERPL-MCNC: 100 MG/DL (ref 70–99)
GLUCOSE SERPL-MCNC: 103 MG/DL (ref 70–99)
GLUCOSE SERPL-MCNC: 81 MG/DL (ref 70–99)
GLUCOSE SERPL-MCNC: 92 MG/DL (ref 70–99)
GLUCOSE UR STRIP.AUTO-MCNC: 100 MG/DL
HCO3 BLDV-SCNC: 12.4 MMOL/L (ref 23–29)
HCT VFR BLD AUTO: 19.1 % (ref 40.5–52.5)
HCT VFR BLD AUTO: 20.4 % (ref 40.5–52.5)
HCT VFR BLD AUTO: 20.7 % (ref 40.5–52.5)
HCT VFR BLD AUTO: 25 % (ref 40.5–52.5)
HCT VFR BLD AUTO: 25.1 % (ref 40.5–52.5)
HEMOCCULT STL QL: NORMAL
HGB BLD-MCNC: 5.7 G/DL (ref 13.5–17.5)
HGB BLD-MCNC: 6.1 G/DL (ref 13.5–17.5)
HGB BLD-MCNC: 6.3 G/DL (ref 13.5–17.5)
HGB BLD-MCNC: 7.6 G/DL (ref 13.5–17.5)
HGB BLD-MCNC: 7.7 G/DL (ref 13.5–17.5)
HGB UR QL STRIP.AUTO: ABNORMAL
INR PPP: 2.51 (ref 0.84–1.16)
KETONES UR STRIP.AUTO-MCNC: NEGATIVE MG/DL
LACTATE BLDV-SCNC: 3.1 MMOL/L (ref 0.4–2)
LACTATE BLDV-SCNC: 3.6 MMOL/L (ref 0.4–2)
LACTATE BLDV-SCNC: 4.7 MMOL/L (ref 0.4–2)
LACTATE BLDV-SCNC: 4.9 MMOL/L (ref 0.4–2)
LACTATE BLDV-SCNC: 5 MMOL/L (ref 0.4–1.9)
LACTATE BLDV-SCNC: 7.4 MMOL/L (ref 0.4–1.9)
LEUKOCYTE ESTERASE UR QL STRIP.AUTO: NEGATIVE
LYMPHOCYTES # BLD: 0.1 K/UL (ref 1–5.1)
LYMPHOCYTES # BLD: 0.1 K/UL (ref 1–5.1)
LYMPHOCYTES NFR BLD: 1.7 %
LYMPHOCYTES NFR BLD: 1.8 %
MAGNESIUM SERPL-MCNC: 1 MG/DL (ref 1.8–2.4)
MAGNESIUM SERPL-MCNC: 1.3 MG/DL (ref 1.8–2.4)
MAGNESIUM SERPL-MCNC: 1.7 MG/DL (ref 1.8–2.4)
MCH RBC QN AUTO: 20.8 PG (ref 26–34)
MCH RBC QN AUTO: 22.8 PG (ref 26–34)
MCHC RBC AUTO-ENTMCNC: 29.9 G/DL (ref 31–36)
MCHC RBC AUTO-ENTMCNC: 30.6 G/DL (ref 31–36)
MCV RBC AUTO: 69.6 FL (ref 80–100)
MCV RBC AUTO: 74.5 FL (ref 80–100)
METHGB MFR BLDV: 0.1 %
MONOCYTES # BLD: 0.2 K/UL (ref 0–1.3)
MONOCYTES # BLD: 0.2 K/UL (ref 0–1.3)
MONOCYTES NFR BLD: 3.8 %
MONOCYTES NFR BLD: 4.2 %
NEUTROPHILS # BLD: 4.6 K/UL (ref 1.7–7.7)
NEUTROPHILS # BLD: 5.6 K/UL (ref 1.7–7.7)
NEUTROPHILS NFR BLD: 93.3 %
NEUTROPHILS NFR BLD: 93.8 %
NITRITE UR QL STRIP.AUTO: NEGATIVE
NT-PROBNP SERPL-MCNC: ABNORMAL PG/ML (ref 0–124)
O2 THERAPY: ABNORMAL
PCO2 BLDV: 22.2 MMHG (ref 40–50)
PERFORMED ON: NORMAL
PERFORMED ON: NORMAL
PH BLDV: 7.36 [PH] (ref 7.35–7.45)
PH UR STRIP.AUTO: 6 [PH] (ref 5–8)
PHOSPHATE SERPL-MCNC: 3.9 MG/DL (ref 2.5–4.9)
PHOSPHATE SERPL-MCNC: 4.3 MG/DL (ref 2.5–4.9)
PLATELET # BLD AUTO: 37 K/UL (ref 135–450)
PLATELET # BLD AUTO: 59 K/UL (ref 135–450)
PMV BLD AUTO: 8.5 FL (ref 5–10.5)
PMV BLD AUTO: 8.6 FL (ref 5–10.5)
PO2 BLDV: 135.3 MMHG (ref 25–40)
POTASSIUM SERPL-SCNC: 2.8 MMOL/L (ref 3.5–5.1)
POTASSIUM SERPL-SCNC: 3.1 MMOL/L (ref 3.5–5.1)
POTASSIUM SERPL-SCNC: 3.4 MMOL/L (ref 3.5–5.1)
PROCALCITONIN SERPL IA-MCNC: 28.32 NG/ML (ref 0–0.15)
PROCALCITONIN SERPL IA-MCNC: 31.34 NG/ML (ref 0–0.15)
PROT SERPL-MCNC: 5.7 G/DL (ref 6.4–8.2)
PROT SERPL-MCNC: 6.6 G/DL (ref 6.4–8.2)
PROT UR STRIP.AUTO-MCNC: 30 MG/DL
PROTHROMBIN TIME: 27 SEC (ref 11.5–14.8)
RBC # BLD AUTO: 2.93 M/UL (ref 4.2–5.9)
RBC # BLD AUTO: 3.36 M/UL (ref 4.2–5.9)
RBC #/AREA URNS HPF: ABNORMAL /HPF (ref 0–4)
SAO2 % BLDV: 99 %
SARS-COV-2 RDRP RESP QL NAA+PROBE: NOT DETECTED
SODIUM SERPL-SCNC: 128 MMOL/L (ref 136–145)
SODIUM SERPL-SCNC: 129 MMOL/L (ref 136–145)
SODIUM SERPL-SCNC: 129 MMOL/L (ref 136–145)
SODIUM SERPL-SCNC: 130 MMOL/L (ref 136–145)
SODIUM SERPL-SCNC: 131 MMOL/L (ref 136–145)
SP GR UR STRIP.AUTO: 1.02 (ref 1–1.03)
TROPONIN T SERPL-MCNC: 0.02 NG/ML
TROPONIN T SERPL-MCNC: 0.04 NG/ML
TROPONIN T SERPL-MCNC: 0.05 NG/ML
TROPONIN T SERPL-MCNC: 0.06 NG/ML
TSH SERPL DL<=0.005 MIU/L-ACNC: 0.56 UIU/ML (ref 0.27–4.2)
UA COMPLETE W REFLEX CULTURE PNL UR: ABNORMAL
UA DIPSTICK W REFLEX MICRO PNL UR: YES
URN SPEC COLLECT METH UR: ABNORMAL
UROBILINOGEN UR STRIP-ACNC: 1 E.U./DL
WBC # BLD AUTO: 4.9 K/UL (ref 4–11)
WBC # BLD AUTO: 5.9 K/UL (ref 4–11)
WBC #/AREA URNS HPF: ABNORMAL /HPF (ref 0–5)

## 2023-04-08 PROCEDURE — 84145 PROCALCITONIN (PCT): CPT

## 2023-04-08 PROCEDURE — 85014 HEMATOCRIT: CPT

## 2023-04-08 PROCEDURE — 82140 ASSAY OF AMMONIA: CPT

## 2023-04-08 PROCEDURE — 36415 COLL VENOUS BLD VENIPUNCTURE: CPT

## 2023-04-08 PROCEDURE — 82550 ASSAY OF CK (CPK): CPT

## 2023-04-08 PROCEDURE — 85384 FIBRINOGEN ACTIVITY: CPT

## 2023-04-08 PROCEDURE — 86923 COMPATIBILITY TEST ELECTRIC: CPT

## 2023-04-08 PROCEDURE — 2000000000 HC ICU R&B

## 2023-04-08 PROCEDURE — 86850 RBC ANTIBODY SCREEN: CPT

## 2023-04-08 PROCEDURE — 2580000003 HC RX 258: Performed by: INTERNAL MEDICINE

## 2023-04-08 PROCEDURE — 6360000002 HC RX W HCPCS: Performed by: HOSPITALIST

## 2023-04-08 PROCEDURE — 6370000000 HC RX 637 (ALT 250 FOR IP): Performed by: INTERNAL MEDICINE

## 2023-04-08 PROCEDURE — 86901 BLOOD TYPING SEROLOGIC RH(D): CPT

## 2023-04-08 PROCEDURE — 6370000000 HC RX 637 (ALT 250 FOR IP): Performed by: HOSPITALIST

## 2023-04-08 PROCEDURE — 83735 ASSAY OF MAGNESIUM: CPT

## 2023-04-08 PROCEDURE — 84484 ASSAY OF TROPONIN QUANT: CPT

## 2023-04-08 PROCEDURE — 85025 COMPLETE CBC W/AUTO DIFF WBC: CPT

## 2023-04-08 PROCEDURE — 71045 X-RAY EXAM CHEST 1 VIEW: CPT

## 2023-04-08 PROCEDURE — 82077 ASSAY SPEC XCP UR&BREATH IA: CPT

## 2023-04-08 PROCEDURE — 86900 BLOOD TYPING SEROLOGIC ABO: CPT

## 2023-04-08 PROCEDURE — 93005 ELECTROCARDIOGRAM TRACING: CPT | Performed by: EMERGENCY MEDICINE

## 2023-04-08 PROCEDURE — 6360000002 HC RX W HCPCS: Performed by: INTERNAL MEDICINE

## 2023-04-08 PROCEDURE — 6360000004 HC RX CONTRAST MEDICATION: Performed by: INTERNAL MEDICINE

## 2023-04-08 PROCEDURE — 83880 ASSAY OF NATRIURETIC PEPTIDE: CPT

## 2023-04-08 PROCEDURE — 73130 X-RAY EXAM OF HAND: CPT

## 2023-04-08 PROCEDURE — 2580000003 HC RX 258: Performed by: EMERGENCY MEDICINE

## 2023-04-08 PROCEDURE — 70450 CT HEAD/BRAIN W/O DYE: CPT

## 2023-04-08 PROCEDURE — 6360000002 HC RX W HCPCS: Performed by: EMERGENCY MEDICINE

## 2023-04-08 PROCEDURE — 83605 ASSAY OF LACTIC ACID: CPT

## 2023-04-08 PROCEDURE — 73206 CT ANGIO UPR EXTRM W/O&W/DYE: CPT

## 2023-04-08 PROCEDURE — 80053 COMPREHEN METABOLIC PANEL: CPT

## 2023-04-08 PROCEDURE — 85018 HEMOGLOBIN: CPT

## 2023-04-08 PROCEDURE — 6370000000 HC RX 637 (ALT 250 FOR IP): Performed by: EMERGENCY MEDICINE

## 2023-04-08 PROCEDURE — 2060000000 HC ICU INTERMEDIATE R&B

## 2023-04-08 PROCEDURE — 2500000003 HC RX 250 WO HCPCS: Performed by: INTERNAL MEDICINE

## 2023-04-08 PROCEDURE — 2580000003 HC RX 258: Performed by: HOSPITALIST

## 2023-04-08 PROCEDURE — 99291 CRITICAL CARE FIRST HOUR: CPT | Performed by: INTERNAL MEDICINE

## 2023-04-08 PROCEDURE — 85610 PROTHROMBIN TIME: CPT

## 2023-04-08 PROCEDURE — 84443 ASSAY THYROID STIM HORMONE: CPT

## 2023-04-08 PROCEDURE — 85730 THROMBOPLASTIN TIME PARTIAL: CPT

## 2023-04-08 PROCEDURE — P9016 RBC LEUKOCYTES REDUCED: HCPCS

## 2023-04-08 PROCEDURE — 36430 TRANSFUSION BLD/BLD COMPNT: CPT

## 2023-04-08 PROCEDURE — 71250 CT THORAX DX C-: CPT

## 2023-04-08 RX ORDER — SODIUM CHLORIDE 9 MG/ML
INJECTION, SOLUTION INTRAVENOUS PRN
Status: DISCONTINUED | OUTPATIENT
Start: 2023-04-08 | End: 2023-04-08 | Stop reason: HOSPADM

## 2023-04-08 RX ORDER — LACTULOSE 10 G/15ML
20 SOLUTION ORAL 2 TIMES DAILY
Status: DISCONTINUED | OUTPATIENT
Start: 2023-04-08 | End: 2023-04-08 | Stop reason: HOSPADM

## 2023-04-08 RX ORDER — ACETAMINOPHEN 650 MG/1
650 SUPPOSITORY RECTAL EVERY 6 HOURS PRN
Status: DISCONTINUED | OUTPATIENT
Start: 2023-04-08 | End: 2023-04-09 | Stop reason: HOSPADM

## 2023-04-08 RX ORDER — IPRATROPIUM BROMIDE AND ALBUTEROL SULFATE 2.5; .5 MG/3ML; MG/3ML
1 SOLUTION RESPIRATORY (INHALATION) EVERY 4 HOURS PRN
Status: DISCONTINUED | OUTPATIENT
Start: 2023-04-08 | End: 2023-04-08 | Stop reason: HOSPADM

## 2023-04-08 RX ORDER — ONDANSETRON 4 MG/1
4 TABLET, ORALLY DISINTEGRATING ORAL EVERY 8 HOURS PRN
Status: DISCONTINUED | OUTPATIENT
Start: 2023-04-08 | End: 2023-04-09 | Stop reason: HOSPADM

## 2023-04-08 RX ORDER — POTASSIUM CHLORIDE 7.45 MG/ML
10 INJECTION INTRAVENOUS ONCE
Status: COMPLETED | OUTPATIENT
Start: 2023-04-08 | End: 2023-04-08

## 2023-04-08 RX ORDER — POTASSIUM CHLORIDE 7.45 MG/ML
10 INJECTION INTRAVENOUS
Status: DISCONTINUED | OUTPATIENT
Start: 2023-04-08 | End: 2023-04-08 | Stop reason: HOSPADM

## 2023-04-08 RX ORDER — SODIUM CHLORIDE 0.9 % (FLUSH) 0.9 %
5-40 SYRINGE (ML) INJECTION PRN
Status: DISCONTINUED | OUTPATIENT
Start: 2023-04-08 | End: 2023-04-09 | Stop reason: HOSPADM

## 2023-04-08 RX ORDER — POLYETHYLENE GLYCOL 3350 17 G/17G
17 POWDER, FOR SOLUTION ORAL DAILY PRN
Status: DISCONTINUED | OUTPATIENT
Start: 2023-04-08 | End: 2023-04-09 | Stop reason: HOSPADM

## 2023-04-08 RX ORDER — SODIUM CHLORIDE 9 MG/ML
INJECTION, SOLUTION INTRAVENOUS CONTINUOUS
Status: DISCONTINUED | OUTPATIENT
Start: 2023-04-08 | End: 2023-04-08 | Stop reason: HOSPADM

## 2023-04-08 RX ORDER — FENTANYL CITRATE 50 UG/ML
50 INJECTION, SOLUTION INTRAMUSCULAR; INTRAVENOUS ONCE
Status: COMPLETED | OUTPATIENT
Start: 2023-04-08 | End: 2023-04-08

## 2023-04-08 RX ORDER — ACETAMINOPHEN 325 MG/1
650 TABLET ORAL EVERY 6 HOURS PRN
Status: DISCONTINUED | OUTPATIENT
Start: 2023-04-08 | End: 2023-04-09 | Stop reason: HOSPADM

## 2023-04-08 RX ORDER — POTASSIUM CHLORIDE 750 MG/1
40 TABLET, EXTENDED RELEASE ORAL ONCE
Status: COMPLETED | OUTPATIENT
Start: 2023-04-08 | End: 2023-04-08

## 2023-04-08 RX ORDER — LACTULOSE 10 G/15ML
20 SOLUTION ORAL 2 TIMES DAILY
Status: DISCONTINUED | OUTPATIENT
Start: 2023-04-08 | End: 2023-04-09 | Stop reason: HOSPADM

## 2023-04-08 RX ORDER — IPRATROPIUM BROMIDE AND ALBUTEROL SULFATE 2.5; .5 MG/3ML; MG/3ML
1 SOLUTION RESPIRATORY (INHALATION) ONCE
Status: COMPLETED | OUTPATIENT
Start: 2023-04-08 | End: 2023-04-08

## 2023-04-08 RX ORDER — MAGNESIUM SULFATE IN WATER 40 MG/ML
2000 INJECTION, SOLUTION INTRAVENOUS ONCE
Status: COMPLETED | OUTPATIENT
Start: 2023-04-08 | End: 2023-04-08

## 2023-04-08 RX ORDER — METRONIDAZOLE 500 MG/100ML
500 INJECTION, SOLUTION INTRAVENOUS EVERY 8 HOURS
Status: DISCONTINUED | OUTPATIENT
Start: 2023-04-08 | End: 2023-04-08 | Stop reason: HOSPADM

## 2023-04-08 RX ORDER — PANTOPRAZOLE SODIUM 40 MG/1
40 TABLET, DELAYED RELEASE ORAL
Status: DISCONTINUED | OUTPATIENT
Start: 2023-04-08 | End: 2023-04-08 | Stop reason: HOSPADM

## 2023-04-08 RX ORDER — MAGNESIUM SULFATE 1 G/100ML
1000 INJECTION INTRAVENOUS ONCE
Status: COMPLETED | OUTPATIENT
Start: 2023-04-08 | End: 2023-04-08

## 2023-04-08 RX ORDER — DIGOXIN 0.25 MG/ML
250 INJECTION INTRAMUSCULAR; INTRAVENOUS EVERY 4 HOURS
Status: COMPLETED | OUTPATIENT
Start: 2023-04-08 | End: 2023-04-08

## 2023-04-08 RX ORDER — ACETAMINOPHEN 325 MG/1
650 TABLET ORAL ONCE
Status: COMPLETED | OUTPATIENT
Start: 2023-04-08 | End: 2023-04-08

## 2023-04-08 RX ORDER — ACETAMINOPHEN 325 MG/1
650 TABLET ORAL EVERY 6 HOURS PRN
Status: DISCONTINUED | OUTPATIENT
Start: 2023-04-08 | End: 2023-04-08 | Stop reason: HOSPADM

## 2023-04-08 RX ORDER — 0.9 % SODIUM CHLORIDE 0.9 %
2000 INTRAVENOUS SOLUTION INTRAVENOUS ONCE
Status: COMPLETED | OUTPATIENT
Start: 2023-04-08 | End: 2023-04-08

## 2023-04-08 RX ORDER — ALBUTEROL SULFATE 90 UG/1
2 AEROSOL, METERED RESPIRATORY (INHALATION) EVERY 6 HOURS PRN
Status: DISCONTINUED | OUTPATIENT
Start: 2023-04-08 | End: 2023-04-09 | Stop reason: HOSPADM

## 2023-04-08 RX ORDER — POLYETHYLENE GLYCOL 3350 17 G/17G
17 POWDER, FOR SOLUTION ORAL DAILY PRN
Status: DISCONTINUED | OUTPATIENT
Start: 2023-04-08 | End: 2023-04-08 | Stop reason: HOSPADM

## 2023-04-08 RX ORDER — BUDESONIDE AND FORMOTEROL FUMARATE DIHYDRATE 160; 4.5 UG/1; UG/1
2 AEROSOL RESPIRATORY (INHALATION) 2 TIMES DAILY
Status: DISCONTINUED | OUTPATIENT
Start: 2023-04-08 | End: 2023-04-08 | Stop reason: HOSPADM

## 2023-04-08 RX ORDER — SODIUM CHLORIDE 0.9 % (FLUSH) 0.9 %
5-40 SYRINGE (ML) INJECTION EVERY 12 HOURS SCHEDULED
Status: DISCONTINUED | OUTPATIENT
Start: 2023-04-08 | End: 2023-04-08 | Stop reason: HOSPADM

## 2023-04-08 RX ORDER — SODIUM CHLORIDE 0.9 % (FLUSH) 0.9 %
5-40 SYRINGE (ML) INJECTION PRN
Status: DISCONTINUED | OUTPATIENT
Start: 2023-04-08 | End: 2023-04-08 | Stop reason: HOSPADM

## 2023-04-08 RX ORDER — ALBUTEROL SULFATE 90 UG/1
2 AEROSOL, METERED RESPIRATORY (INHALATION) EVERY 6 HOURS PRN
Status: DISCONTINUED | OUTPATIENT
Start: 2023-04-08 | End: 2023-04-08 | Stop reason: HOSPADM

## 2023-04-08 RX ORDER — CLINDAMYCIN PHOSPHATE 600 MG/50ML
600 INJECTION INTRAVENOUS EVERY 8 HOURS
Status: DISCONTINUED | OUTPATIENT
Start: 2023-04-08 | End: 2023-04-09 | Stop reason: HOSPADM

## 2023-04-08 RX ORDER — ONDANSETRON 2 MG/ML
4 INJECTION INTRAMUSCULAR; INTRAVENOUS EVERY 6 HOURS PRN
Status: DISCONTINUED | OUTPATIENT
Start: 2023-04-08 | End: 2023-04-09 | Stop reason: HOSPADM

## 2023-04-08 RX ORDER — DIGOXIN 0.25 MG/ML
125 INJECTION INTRAMUSCULAR; INTRAVENOUS EVERY 6 HOURS
Status: DISCONTINUED | OUTPATIENT
Start: 2023-04-08 | End: 2023-04-09 | Stop reason: HOSPADM

## 2023-04-08 RX ORDER — ONDANSETRON 4 MG/1
4 TABLET, ORALLY DISINTEGRATING ORAL EVERY 8 HOURS PRN
Status: DISCONTINUED | OUTPATIENT
Start: 2023-04-08 | End: 2023-04-08 | Stop reason: HOSPADM

## 2023-04-08 RX ORDER — ACETAMINOPHEN 650 MG/1
650 SUPPOSITORY RECTAL EVERY 6 HOURS PRN
Status: DISCONTINUED | OUTPATIENT
Start: 2023-04-08 | End: 2023-04-08 | Stop reason: HOSPADM

## 2023-04-08 RX ORDER — LACTULOSE 10 G/15ML
20 SOLUTION ORAL DAILY PRN
Status: DISCONTINUED | OUTPATIENT
Start: 2023-04-08 | End: 2023-04-08

## 2023-04-08 RX ORDER — METRONIDAZOLE 500 MG/100ML
500 INJECTION, SOLUTION INTRAVENOUS EVERY 8 HOURS
Status: DISCONTINUED | OUTPATIENT
Start: 2023-04-08 | End: 2023-04-08

## 2023-04-08 RX ORDER — POTASSIUM CHLORIDE 7.45 MG/ML
10 INJECTION INTRAVENOUS PRN
Status: DISCONTINUED | OUTPATIENT
Start: 2023-04-08 | End: 2023-04-09 | Stop reason: HOSPADM

## 2023-04-08 RX ORDER — POTASSIUM CHLORIDE 7.45 MG/ML
10 INJECTION INTRAVENOUS
Status: COMPLETED | OUTPATIENT
Start: 2023-04-08 | End: 2023-04-08

## 2023-04-08 RX ORDER — PANTOPRAZOLE SODIUM 40 MG/10ML
40 INJECTION, POWDER, LYOPHILIZED, FOR SOLUTION INTRAVENOUS DAILY
Status: DISCONTINUED | OUTPATIENT
Start: 2023-04-08 | End: 2023-04-09 | Stop reason: HOSPADM

## 2023-04-08 RX ORDER — IPRATROPIUM BROMIDE AND ALBUTEROL SULFATE 2.5; .5 MG/3ML; MG/3ML
1 SOLUTION RESPIRATORY (INHALATION) EVERY 4 HOURS PRN
Status: DISCONTINUED | OUTPATIENT
Start: 2023-04-08 | End: 2023-04-09 | Stop reason: HOSPADM

## 2023-04-08 RX ORDER — MAGNESIUM SULFATE IN WATER 40 MG/ML
2000 INJECTION, SOLUTION INTRAVENOUS PRN
Status: DISCONTINUED | OUTPATIENT
Start: 2023-04-08 | End: 2023-04-09 | Stop reason: HOSPADM

## 2023-04-08 RX ORDER — ONDANSETRON 2 MG/ML
4 INJECTION INTRAMUSCULAR; INTRAVENOUS EVERY 6 HOURS PRN
Status: DISCONTINUED | OUTPATIENT
Start: 2023-04-08 | End: 2023-04-08 | Stop reason: HOSPADM

## 2023-04-08 RX ADMIN — SODIUM CHLORIDE 2000 ML: 9 INJECTION, SOLUTION INTRAVENOUS at 02:49

## 2023-04-08 RX ADMIN — SODIUM CHLORIDE: 9 INJECTION, SOLUTION INTRAVENOUS at 09:25

## 2023-04-08 RX ADMIN — IOHEXOL 75 ML: 350 INJECTION, SOLUTION INTRAVENOUS at 09:05

## 2023-04-08 RX ADMIN — MAGNESIUM SULFATE HEPTAHYDRATE 2000 MG: 40 INJECTION, SOLUTION INTRAVENOUS at 15:19

## 2023-04-08 RX ADMIN — SODIUM BICARBONATE: 84 INJECTION, SOLUTION INTRAVENOUS at 18:37

## 2023-04-08 RX ADMIN — SODIUM BICARBONATE: 84 INJECTION, SOLUTION INTRAVENOUS at 14:11

## 2023-04-08 RX ADMIN — FENTANYL CITRATE 50 MCG: 0.05 INJECTION, SOLUTION INTRAMUSCULAR; INTRAVENOUS at 20:35

## 2023-04-08 RX ADMIN — LACTULOSE 20 G: 20 SOLUTION ORAL at 20:36

## 2023-04-08 RX ADMIN — CEFEPIME 2000 MG: 2 INJECTION, POWDER, FOR SOLUTION INTRAVENOUS at 11:10

## 2023-04-08 RX ADMIN — POTASSIUM CHLORIDE 10 MEQ: 7.46 INJECTION, SOLUTION INTRAVENOUS at 14:57

## 2023-04-08 RX ADMIN — VANCOMYCIN HYDROCHLORIDE 1000 MG: 1 INJECTION, POWDER, LYOPHILIZED, FOR SOLUTION INTRAVENOUS at 03:48

## 2023-04-08 RX ADMIN — FIDAXOMICIN 200 MG: 200 TABLET, FILM COATED ORAL at 21:02

## 2023-04-08 RX ADMIN — IPRATROPIUM BROMIDE AND ALBUTEROL SULFATE 1 AMPULE: 2.5; .5 SOLUTION RESPIRATORY (INHALATION) at 03:15

## 2023-04-08 RX ADMIN — CLINDAMYCIN PHOSPHATE 600 MG: 600 INJECTION, SOLUTION INTRAVENOUS at 20:17

## 2023-04-08 RX ADMIN — SODIUM CHLORIDE: 9 INJECTION, SOLUTION INTRAVENOUS at 09:42

## 2023-04-08 RX ADMIN — MAGNESIUM SULFATE HEPTAHYDRATE 1000 MG: 1 INJECTION, SOLUTION INTRAVENOUS at 03:14

## 2023-04-08 RX ADMIN — ACETAMINOPHEN 650 MG: 325 TABLET ORAL at 02:56

## 2023-04-08 RX ADMIN — DIGOXIN 250 MCG: 0.25 INJECTION INTRAMUSCULAR; INTRAVENOUS at 15:19

## 2023-04-08 RX ADMIN — DIGOXIN 125 MCG: 0.25 INJECTION INTRAMUSCULAR; INTRAVENOUS at 20:36

## 2023-04-08 RX ADMIN — POTASSIUM CHLORIDE 40 MEQ: 750 TABLET, EXTENDED RELEASE ORAL at 11:40

## 2023-04-08 RX ADMIN — PHYTONADIONE 10 MG: 10 INJECTION, EMULSION INTRAMUSCULAR; INTRAVENOUS; SUBCUTANEOUS at 09:43

## 2023-04-08 RX ADMIN — LACTULOSE 20 G: 10 SOLUTION ORAL at 14:57

## 2023-04-08 RX ADMIN — VANCOMYCIN HYDROCHLORIDE 1000 MG: 1 INJECTION, POWDER, LYOPHILIZED, FOR SOLUTION INTRAVENOUS at 21:34

## 2023-04-08 RX ADMIN — PIPERACILLIN AND TAZOBACTAM 3375 MG: 3; .375 INJECTION, POWDER, LYOPHILIZED, FOR SOLUTION INTRAVENOUS at 03:01

## 2023-04-08 RX ADMIN — ACETAMINOPHEN 650 MG: 325 TABLET ORAL at 18:48

## 2023-04-08 RX ADMIN — METRONIDAZOLE 500 MG: 500 INJECTION, SOLUTION INTRAVENOUS at 14:09

## 2023-04-08 RX ADMIN — POTASSIUM CHLORIDE 10 MEQ: 7.45 INJECTION INTRAVENOUS at 03:15

## 2023-04-08 RX ADMIN — POTASSIUM CHLORIDE 10 MEQ: 7.46 INJECTION, SOLUTION INTRAVENOUS at 15:57

## 2023-04-08 RX ADMIN — POTASSIUM CHLORIDE 10 MEQ: 7.46 INJECTION, SOLUTION INTRAVENOUS at 11:45

## 2023-04-08 RX ADMIN — POTASSIUM CHLORIDE 10 MEQ: 7.46 INJECTION, SOLUTION INTRAVENOUS at 12:35

## 2023-04-08 RX ADMIN — DIGOXIN 250 MCG: 0.25 INJECTION INTRAMUSCULAR; INTRAVENOUS at 11:43

## 2023-04-08 ASSESSMENT — PAIN - FUNCTIONAL ASSESSMENT: PAIN_FUNCTIONAL_ASSESSMENT: ACTIVITIES ARE NOT PREVENTED

## 2023-04-08 ASSESSMENT — PAIN SCALES - GENERAL
PAINLEVEL_OUTOF10: 9
PAINLEVEL_OUTOF10: 9

## 2023-04-08 ASSESSMENT — PAIN DESCRIPTION - ORIENTATION
ORIENTATION: RIGHT
ORIENTATION: RIGHT

## 2023-04-08 ASSESSMENT — PAIN DESCRIPTION - LOCATION
LOCATION: HAND
LOCATION: HAND

## 2023-04-08 ASSESSMENT — LIFESTYLE VARIABLES
HOW MANY STANDARD DRINKS CONTAINING ALCOHOL DO YOU HAVE ON A TYPICAL DAY: PATIENT DOES NOT DRINK
HOW OFTEN DO YOU HAVE A DRINK CONTAINING ALCOHOL: NEVER

## 2023-04-08 NOTE — PROGRESS NOTES
[facility] Pharmacokinetic Monitoring Service - Vancomycin     Aurora Siegel is a 79 y.o. male starting on vancomycin therapy for CAP. Pharmacy consulted by Dr. Yanique Hilliard for monitoring and adjustment. Target Concentration: Goal AUC/AMRIT 400-600 mg*hr/L    Additional Antimicrobials: cefepime    Pertinent Laboratory Values: Wt Readings from Last 1 Encounters:   03/20/23 138 lb (62.6 kg)     Temp Readings from Last 1 Encounters:   04/08/23 98.2 °F (36.8 °C) (Oral)     CrCl cannot be calculated (Unknown ideal weight. ). Recent Labs     04/08/23  0203   CREATININE 1.3   WBC 5.9     Procalcitonin: NA    Pertinent Cultures:  Culture Date Source Results   4/8 [facility] NA   MRSA Nasal Swab: was ordered by provider, awaiting results.     Plan:  Dosing recommendations based on Bayesian software  Start vancomycin 1000mg q24h  Anticipated AUC of 411 and trough concentration of 12.1 at steady state  Renal labs as indicated   Vancomycin concentration ordered for 4/9 @ 0300   Pharmacy will continue to monitor patient and adjust therapy as indicated    Thank you for the consult,  Kian Barker, [address]  4/8/2023 9:13 AM

## 2023-04-08 NOTE — CONSULTS
04/08/23 @ 1925 Consult placed to Infectious Disease    [facility] From: Wenceslao Mar RE: Lou Raphael 1955 RM: [id number] Routine Consult for: Nec Fasc Need Callback: NO CALLBACK REQ CCU ROUTINE

## 2023-04-08 NOTE — ED NOTES
Spoke with provider about pts hypotension. Provider gave verbal order to run blood at 999mL/hr. Writer changed blood to 999mL/hr. Vitals taken and documented at this time.       [facility]  04/08/23 9050

## 2023-04-08 NOTE — PROGRESS NOTES
GI consult called to answering service of [facility] as history noted, spoke with [place], 4/8/23 @ 9991[facility]      Cardiology consult called to answering service, 4/8/23 @ 6781[facility]

## 2023-04-08 NOTE — ED NOTES
7433 - Perfect Serve sent to Dr. Nish Lai for admission     Ashley Alaniz  04/08/23 0514    0528 - Dr. Nish Lai returned the page and spoke directly to Dr. Ryan Young.      Ashley Alaniz  04/08/23 4901

## 2023-04-08 NOTE — PROGRESS NOTES
Admission assessment completed. See flowsheet. A/O x 1, disoriented to place, time, and situation. States his hand hurts. Lungs sounds clear, diminished in bases. Afib RVR on bedside monitor. Bowel sounds active. RUE edema noted. Right hand swollen with dark blisters under skin. Pt states he is unaware of how they got there. Urinary catheter in place draining clear yellow urine. Labs reviewed. Cont to monitor.

## 2023-04-08 NOTE — ED PROVIDER NOTES
I assumed care of this patient at shift change with admission pending. While Dr. Sage Magaña was still here we were both called into the room because the patient's hand apparently looks much worse according to the nurse. Dr. Sage Magaña agrees the hand looks like the swelling and cyanosis is progressing. At this point we decided to order a CTA of the right upper extremity. Patient still states he has sensation in all digits and still has strong distal pulses in the right arm. I did reach out to orthopedic surgery, Dr. Andrea Anderson again to make them aware that the patient's hand looks significantly worse. I also reached out to the hospitalist, Dr. Rayo Singh to make him aware as well. The patient does have an ICU bed assigned. I still think he is appropriate to go to the ICU. He has multiple medical issues occurring right now. I will continue to follow the CTA result. Vascular surgery was not consulted last night but again the patient does have strong pulses and if he has an occlusion it would be something distal in the hand. I am more concerned for an infectious source in the hand. Patient has been given antibiotics.      Dada Arrington MD  04/08/23 6831
MICROSCOPIC URINALYSIS - Abnormal; Notable for the following components:    RBC, UA 5-10 (*)     Epithelial Cells, UA 6-10 (*)     Bacteria, UA 2+ (*)     All other components within normal limits   COVID-19, RAPID   CULTURE, BLOOD 1   CULTURE, BLOOD 2   ETHANOL    Narrative:     Unique Rogers  SCEDEANN tel. [phone],  Chemistry results called to and read back by Briseida Noble RN, 04/08/2023  02:53, by [id number] Baird    Narrative:     ORDER#: [id number]                          ORDERED BY: Bernie Servin  SOURCE: Stool                              COLLECTED:  04/08/23 03:12  ANTIBIOTICS AT HODA.:                      RECEIVED :  04/08/23 03:19   TYPE AND SCREEN   PREPARE RBC (CROSSMATCH)   TYPE AND SCREEN       Interpretation per the Radiologist below, if obtained/available at the time of this note:    CT CHEST ABDOMEN PELVIS WO CONTRAST Additional Contrast? None   Preliminary Result   1. Nondisplaced left posterior 12th rib fracture. 2. Focal consolidation in the posterior left lower lobe may reflect pneumonia   or atelectasis. Radiographic follow-up to resolution is recommended. 3. Evidence of cirrhosis and portal hypertension. 4. Motion degrades the quality of the exam.   5. Diffuse colonic wall thickening most consistent with an infectious or   inflammatory colitis. Alternately this may be due to the liver disease. 6. Atherosclerosis. XR HAND RIGHT (MIN 3 VIEWS)   Preliminary Result   Dorsal soft tissue swelling. No acute osseous abnormality. XR CHEST PORTABLE   Final Result   No evidence of acute cardiopulmonary disease. CT Head W/O Contrast   Preliminary Result   No acute intracranial abnormality. Acute right maxillary sinus disease. All other labs/imaging were within normal range or not returned as of this dictation.     EMERGENCY DEPARTMENT COURSE and DIFFERENTIAL DIAGNOSIS/MDM:   Vitals:    Vitals:    04/08/23 8762 04/08/23

## 2023-04-08 NOTE — PROGRESS NOTES
Spoke with Dr. Shannan Carrera. He request Vitamin K to be stopped as pt is in Afib. Plan is to recheck PT/INR tomorrow morning.

## 2023-04-08 NOTE — DISCHARGE INSTR - COC
Test Resulted            Nurse Assessment:  Last Vital Signs: /81   Pulse (!) 129   Temp 99.3 °F (37.4 °C) (Temporal)   Resp 26   Ht 5' 8\" (1.727 m)   Wt 147 lb 11.3 oz (67 kg)   SpO2 99%   BMI 22.46 kg/m²     Last documented pain score (0-10 scale):    Last Weight:   Wt Readings from Last 1 Encounters:   04/08/23 147 lb 11.3 oz (67 kg)     Mental Status:  A/Ox1    IV Access:  - Peripheral IV - site  R Antecubital, insertion date: 4/8/23                       L AC, L FA  Nursing Mobility/ADLs:  Walking   Assisted  Transfer  Assisted  Bathing  Assisted  Dressing  Assisted  Toileting  Assisted  Feeding  Independent  Med Admin  Assisted  Med Delivery   whole    Wound Care Documentation and Therapy:        Elimination:  Continence: Bowel: No  Bladder: FC  Urinary Catheter: Insertion Date: 4/8/23    Colostomy/Ileostomy/Ileal Conduit: No       Date of Last BM: 4/8/23    Intake/Output Summary (Last 24 hours) at 4/8/2023 1547  Last data filed at 4/8/2023 1501  Gross per 24 hour   Intake 2154.52 ml   Output 750 ml   Net 1404.52 ml     I/O last 3 completed shifts: In: 789.9 [I.V.:75.8; Blood:500; IV Piggyback:214.1]  Out: 300 [Urine:300]    Safety Concerns: At Risk for Falls    Impairments/Disabilities:      None    Nutrition Therapy:  Current Nutrition Therapy:   - currently NPO    Routes of Feeding: None  Liquids: Thin Liquids  Daily Fluid Restriction: no  Last Modified Barium Swallow with Video (Video Swallowing Test): not done    Treatments at the Time of Hospital Discharge:   Respiratory Treatments: RA  Oxygen Therapy:  is not on home oxygen therapy.   Ventilator:    - No ventilator support    Rehab Therapies: NA  Weight Bearing Status/Restrictions: No weight bearing restrictions  Other Medical Equipment (for information only, NOT a DME order):  wheelchair  Other Treatments: ***    Patient's personal belongings (please select all that are sent with patient):  None    RN SIGNATURE:  Electronically

## 2023-04-08 NOTE — PROGRESS NOTES
Report given to Vesta Sanabria RN at [facility] ICU. Pt was picked up and is being transferred to [facility] via EMS. Report to EMS. Care transferred at this time.

## 2023-04-08 NOTE — ED NOTES
0795 - Perfect Serve sent to Dr. Ramirez Okeefe with Orthopedic Surgery      Orlando Saleh  04/08/23 [address] - Dr. Ramirez Okeefe returned the page and spoke directly to Dr. Carlos Ivey  04/08/23 2636

## 2023-04-08 NOTE — H&P
resolution is recommended. 3. Evidence of cirrhosis and portal hypertension. 4. Motion degrades the quality of the exam.   5. Diffuse colonic wall thickening most consistent with an infectious or   inflammatory colitis. Alternately this may be due to the liver disease. 6. Atherosclerosis. XR HAND RIGHT (MIN 3 VIEWS)   Preliminary Result   Dorsal soft tissue swelling. No acute osseous abnormality. XR CHEST PORTABLE   Final Result   No evidence of acute cardiopulmonary disease. CT Head W/O Contrast   Preliminary Result   No acute intracranial abnormality. Acute right maxillary sinus disease. ASSESSMENT:    Active Hospital Problems    Diagnosis Date Noted    Sepsis ([facility] [address] [A41.9] 04/08/2023         PLAN:    Sepsis  - LLL pna  - IV vanc / cefepime  - follow up blood cutlures, trend lactic acid    R hand swelling/ischemia  - Ortho consulted, to see this am    Anemia  - history of variceal bleed, guiac neg in ER, no recent history concerning for gib  - transfuse, repeat h/h    MAT vs afib  - Cardiology consultation    CAD  - elev trop, no chest pain, suspect demand / type II nstemi    COPD  - continue trelegy, prn duoneb hhn    Cirrhosis  - continue lactulose     Diet: No diet orders on file  Code Status: Prior       Dispo - icu  Tot crit care time > 35 min       Alley Gan MD    Thank you Manjit Sinha MD for the opportunity to be involved in this patient's care. If you have any questions or concerns please feel free to contact me at [phone].

## 2023-04-08 NOTE — CONSULTS
Reviewed CTA and spoke with staff at [facility]  Palpable pulses and CTA demonstrates radial and ulnar arteries are indeed patent with flow demonstrated beyond the wrist  Etiology of discoloration and swelling unknown  Consider venous thrombosis or septic emboli  Already auto-anticoagulated  No acute vascular surgical need based on reported exams and current CTA unless there is an abrupt change in exam such as loss of pulse which would necessitate repeat imaging  Please call with questions or concerns    Abby Hennessy DO, FACS, FSVS, [address] [facility]
lungs  RUE CTA:   Limited exam as described with suboptimal visualization of the arteries in   the distal forearm and hand. No obvious intraluminal filling defect or   thrombus. Cirrhosis       Distended gallbladder. Right upper quadrant gallbladder ultrasound may be   helpful for further evaluation if clinically indicated. Chest CT  1. Nondisplaced left posterior 12th rib fracture. 2. Focal consolidation in the posterior left lower lobe may reflect pneumonia   or atelectasis. Radiographic follow-up to resolution is recommended. 3. Evidence of cirrhosis and portal hypertension. 4. Motion degrades the quality of the exam.   5. Diffuse colonic wall thickening most consistent with an infectious or   inflammatory colitis. Alternately this may be due to the liver disease. 6. Atherosclerosis. ASSESSMENT:  Acute metabolic encephalopathy  Metabolic and lactic acidosis  Sepsis: possibly pneumonia and possibly right hand infection  Afib RVR  Right hand and 2nd digit swelling and cyanosis: unclear etiology based on tests so far: consider venous occlusion or small arterial emboli or septic emboli or cellulitis or Buerger's disease  COPD not in exacerbation  CAD, eleavted troponin likley demand type ischemia  ESLD with alcoholic cirrhosis and HCV   CT ABD with  \"Diffuse colonic wall thickening most consistent with an infectious or   inflammatory colitis. Alternately this may be due to the liver disease. Acute on chronic anemia with no s/o active bleed    PLAN:  MIVF with bicarb  Vanc and Cefepime. Add flagyl for anaerobic coverage  Cardiolgy following and has ordered digoxin; INR elevated so no additional AC a this time  Ortho and vascular surgery consulted.  No immediate plans for intervention but the pt would be better served where these services are immediately available  Blood and sputum cx  Continue inhalers  Transfuse 2nd u PRBC and monitor H/H q6 today  GI consulted  Start lactulose  Replace K
Results   Component Value Date    CKTOTAL 1,426 (H) 04/08/2023    TROPONINI 0.04 (H) 04/08/2023   CK 1426  INR 2.5  EKG:  I have reviewed EKG with the following interpretation:  Impression:  Atrial fibrillation with rapid ventricular responseLeft anterior fascicular blockSeptal infarct , age undeterminedAbnormal ECGWhen compared with ECG of 11-OCT-2022 06:25,afib is now present. Confirmed by Quynh Perez MD, [address] (1986) on 4/8/2023 7:52:50   Chest xray  ordered by me today  Echo 9.21.21   Summary   Ectopy throughout the study. Left ventricular systolic function is normal with ejection fraction   estimated at 50-55 %. No regional wall motion abnormalities are noted. Grade I diastolic dysfunction with normal filling pressure. The aortic root is mildly dilated at 3.9cm. The right atrium is mildly dilated. Mild mitral regurgitation. Aortic valve sclerosis without aortic stenosis. Mild tricuspid regurgitation. Normal systolic pulmonary artery pressure (SPAP) estimated at 21 mmHg (RA   pressure 8 mmHg). Mild pulmonic regurgitation present.   7-9-2018 55-60% EF, DD1, AV sclerosis, negative bubble study      Signature      ------------------------------------------------------------------   Electronically signed by Ny Laureano MD, [facility] (Interpreting   physician) on 09/21/2021 at 11:16 AM    Assessment  Atrial fibrillations with rapid ventricular response  Acute myocardial injury  Right hand ischemic injury possible small thrombi going to digits he seems to have gangrene of little finger right hand. Remaining fingers have good capillary refill are warm pink and good sensation.   Hypokalemia   Cirrhosis of liver  Portal hypertension  Probable hepatic encephalopathy  Anemia due to chronic blood loss in past had varices and erosive esophagitis    Recommend  Will not reverse INR he is autoanticoagulated   Will monitor INR and start heparin if no active bleed and INR drops below 2  Replace Packed cells Keep hb
valid for 3 days - nurse to verify valid specimen     Standing Status:   Standing     Number of Occurrences:   1    TYPE AND SCREEN     Specimen is valid for 3 days - nurse to verify valid specimen     Standing Status:   Standing     Number of Occurrences:   1    PREPARE RBC (CROSSMATCH), 1 Units     Standing Status:   Standing     Number of Occurrences:   1     Order Specific Question:   When Needed? Answer:   When Available    PREPARE RBC (CROSSMATCH), 1 Units     Standing Status:   Standing     Number of Occurrences:   1     Order Specific Question:   When Needed? Answer:   When Available    ADMIT TO INPATIENT     Standing Status:   Standing     Number of Occurrences:   1     Order Specific Question:   Discharge Plan:     Answer:   Home with Office Follow-up     Order Specific Question:   Telemetry/Cardiac Monitoring Required? Answer:   Yes    Discharge patient     Standing Status:   Standing     Number of Occurrences:   1     Order Specific Question:   Discharge Disposition     Answer: Other Acute Facility       Treatment Plan: I had a lengthy discussion with my supervising physician Dr. Grisel Medina concerning this patient. We have also discussed the case with orthopedic hand specialist.  The likely cause of this embolic disease versus Buerger's disease. No immediate surgical intervention is necessary. Observation and waiting for demarcation is appropriate. Elevation should be limited and should only be at heart level. Consideration for vascular consultation.
base.  There is mild right dependent atelectasis. Soft Tissues/Bones: There is a nondisplaced left posterior 12th rib fracture. A fusion anomaly is noted at the left 1st and 2nd ribs. There is a subacute  left lateral 7th rib fracture. Abdomen/Pelvis:    Organs: Motion and streak artifact limit the exam.  The gallbladder is mildly  distended. The liver has a nodular contour. The spleen is enlarged  measuring 14.3 cm in length. A focal pancreatic abnormality is not  identified. Varices are noted in the splenic hilum. The adrenal glands are  unremarkable. The kidneys are not obstructed. GI/Bowel: There is thickening of the colonic wall diffusely. The bowel is  not obstructed. The appendix is within normal limits. Pelvis: A Putnam catheter is noted in the urinary bladder. There is no free  fluid in the pelvis. Peritoneum/Retroperitoneum: There is dense calcification of the abdominal  aorta. There is no free intraperitoneal air. Bones/Soft Tissues: An acute process is not identified. Impression: 1. Nondisplaced left posterior 12th rib fracture. 2. Focal consolidation in the posterior left lower lobe may reflect pneumonia  or atelectasis. Radiographic follow-up to resolution is recommended. 3. Evidence of cirrhosis and portal hypertension. 4. Motion degrades the quality of the exam.  5. Diffuse colonic wall thickening most consistent with an infectious or  inflammatory colitis. Alternately this may be due to the liver disease. 6. Atherosclerosis. XR HAND RIGHT (MIN 3 VIEWS)  Narrative: EXAMINATION:  THREE X-RAY VIEWS OF THE RIGHT HAND    4/8/2023 2:18 am    COMPARISON:  None. HISTORY:  ORDERING SYSTEM PROVIDED HISTORY: swelling, bruising, edema  TECHNOLOGIST PROVIDED HISTORY:  Reason for exam:->swelling, bruising, edema  Reason for Exam: rt hand swelling, bruising, edema, ams, unable to give hx    FINDINGS:  There is no evidence of acute fracture or dislocation.   Dorsal soft

## 2023-04-08 NOTE — CONSULTS
Pharmacy Note  Vancomycin Consult    Joie Bravo is a 79 y.o. male started on Vancomycin for NATARAJAN; consult received from Dr. Echo Callaway to manage therapy. Also receiving the following antibiotics: Cefepime, Flagyl. Allergies:  Codeine     Recent Labs     04/08/23  0952 04/08/23  1350   CREATININE 1.2 1.1  1.0       Recent Labs     04/08/23  0203   WBC 5.9       Estimated Creatinine Clearance: 68 mL/min (based on SCr of 1 mg/dL). No intake or output data in the 24 hours ending 04/08/23 1839    Wt Readings from Last 1 Encounters:   04/08/23 147 lb 11.3 oz (67 kg)         Body mass index is 22.46 kg/m². Loading dose: 1000 mg at 04:01 04/08/2023. Regimen: 1000 mg IV every 18 hours. Start time: 2200 on 04/08/2023  Exposure target: AUC24 (range)400-600 mg/L.hr   AUC24,ss: 450 mg/L.hr  Probability of AUC24 > 400: 64 %  Ctrough,ss: 13.5 mg/L  Probability of Ctrough,ss > 20: 17 %  Probability of nephrotoxicity (Lodise BHAVESH 2009): 9 %  Vanc level tomorrow at noon  Thank you for the consult.    Marcela Jeong, [address] [place] [address]  4/8/2023 at 6:44 PM

## 2023-04-08 NOTE — PROGRESS NOTES
Patient arrived via direct admit from [facility]. Patient hooked up to monitors, VS charted. Patient is in AFib RVR and Febrile. Right Hand appears worsening from documented pictures at [facility]. Dr. Kathryn Wilkinson at bedside to assess patient. Consults ordered and placed. Malone at bedside to assess patient. Recommendation to transfer to .

## 2023-04-08 NOTE — PROGRESS NOTES
Blood bank called and they are out of pt's blood type. They are having more delivered in \"a few hours\".

## 2023-04-08 NOTE — PROGRESS NOTES
Transfer center called. Pt was accepted to ICU at [facility]. They are waiting for downgrades to move before assigning a bed.

## 2023-04-09 LAB
BACTERIA BLD CULT ORG #2: ABNORMAL
BACTERIA BLD CULT: ABNORMAL
BACTERIA BLD CULT: ABNORMAL
ORGANISM: ABNORMAL
REPORT: NORMAL

## 2023-04-09 NOTE — DISCHARGE SUMMARY
Rales/Wheezes/Rhonchi. Cardiovascular:  Regular rate and rhythm with normal S1/S2 without murmurs, rubs or gallops. Abdomen: Soft, non-tender, mildly distended with normal bowel sounds. No fluid thrill or shifting dullness  Musculoskeletal:  No clubbing, cyanosis or edema bilaterally. Full range of motion without deformity. Skin: Skin color, texture, turgor normal.    Right hand exam-diffuse swelling of right hand dorsal and ventral surface. Blackish discoloration of distal half of fifth digit. Blackish discoloration of almost entire palm with fluid-filled blisters present diffusely on multiple parts of the hand, severely tender to palpation. See pictures for more details  Right radial/ulnar pulses well felt  Cap refill >2 seconds  Minimal movement of right hand fingers due to severe swelling and pain       Media Information      Document Information    Wound Care Image:  Wound      04/08/2023 21:55   Attached To: Hospital Encounter on 4/8/23     Source Information    Loren Johnson DO  [facility]     Labs:  For convenience and continuity at follow-up the following most recent labs are provided:      CBC:    Lab Results   Component Value Date/Time    WBC 4.9 04/08/2023 06:35 PM    HGB 7.6 04/08/2023 06:35 PM    HCT 25.0 04/08/2023 06:35 PM    PLT 37 04/08/2023 06:35 PM       Renal:    Lab Results   Component Value Date/Time     04/08/2023 06:35 PM    K 3.1 04/08/2023 06:35 PM     04/08/2023 06:35 PM    CO2 15 04/08/2023 06:35 PM    BUN 28 04/08/2023 06:35 PM    CREATININE 1.0 04/08/2023 06:35 PM    CALCIUM 7.0 04/08/2023 06:35 PM    PHOS 3.9 04/08/2023 01:50 PM       Consults:     IP CONSULT TO PHARMACY  IP CONSULT TO CARDIOLOGY  IP CONSULT TO GI  IP CONSULT TO ORTHOPEDIC SURGERY  IP CONSULT TO INFECTIOUS DISEASES    Disposition: Transfer to  MICU    Condition at Discharge: Stable    Discharge Instructions/Follow-up: Given    Code Status: Full code    Activity: activity as

## 2023-04-09 NOTE — ED NOTES
Micro called with 4 blood culture bottles positive for group A strep. Called  MICU and spoke with Bismark Flowers RN, patient's nurse and reported the positive blood cultures to him.      Peyton Mcfarlane RN  04/09/23 6329

## 2023-04-09 NOTE — PROGRESS NOTES
Patient transferred to [facility] by ambulance. Gave report to nurse on site. Patient stable upon discharge.      Angelina Hernández RN

## 2023-04-09 NOTE — PROGRESS NOTES
Cooling blanket turned off as per Dr. Edwin Kilpatrick. Patient current temp is 36.9 C on the cooler and 99.4 from bladder temp.      Agustín Weber RN

## 2023-04-09 NOTE — SIGNIFICANT EVENT
Paged to evaluate patient      At bedside patient was complaining of 9/10 pain in the right arm. Right arm noted to be ischemic, cyanotic pictures attached. Progressed to this within 2 hours          Noted crepitus also pain with minimal palpation. Patient does have extensive history of smoking. 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 Currently on IV Vanco cefepime, with addition of clindamycin  Noted patient is in A-fib with RVR this is given infection. Patient already takes digoxin, will give one-time bolus of amiodarone. I also talked in detail with family numbers at bedside. Transfer center initiated, I spoke with Dr. Marychuy Mendoza. Patient was accepted to the MICU            Due to the immediate potential for life-threatening deterioration due to acute deep tissue infection, I spent 34 minutes providing critical care. This time is excluding time spent performing procedures.

## 2025-06-10 NOTE — TELEPHONE ENCOUNTER
RETURNED PATIENTS CALL REGARDING PAIN MEDIATION AND LET HIM KNOW THAT THE PROVIDERS WILL NOT GIVE ANYTHING OTHER THAN THE GABAPENTIN THAT HE WAS ALREADY GIVEN. SUGGESTED THAT HE CALL HIS PCP FOR SOMETHING OTHER THAN THE GABAPENTINI FOR THE PAIN AND HE STATED THAT HE DOES NOT HAVE A PCP.     HE IS SHCEDULED FOR A CERVICAL FABIENNE ON 8/13/18 IF APPROVED BY THE INSURANCE
No bruits; no thyromegaly or nodules

## (undated) DEVICE — GAUZE,SPONGE,4"X4",16PLY,STRL,LF,10/TRAY: Brand: MEDLINE

## (undated) DEVICE — ENDOSCOPIC KIT 2 12 FT OP4 DE2 GWN SYR

## (undated) DEVICE — GROUNDING PAD /C 9FT CORD  PK/25

## (undated) DEVICE — YANKAUER,BULB TIP,W/O VENT,RIGID,STERILE: Brand: MEDLINE

## (undated) DEVICE — ALCOHOL RUBBING 16OZ 70% ISO

## (undated) DEVICE — STERILE POLYISOPRENE POWDER-FREE SURGICAL GLOVES: Brand: PROTEXIS

## (undated) DEVICE — CONMED SCOPE SAVER BITE BLOCK, 20X27 MM: Brand: SCOPE SAVER

## (undated) DEVICE — FIAPC® PROBE W/ FILTER 2200 C OD 2.3MM/6.9FR; L 2.2M/7.2FT: Brand: ERBE

## (undated) DEVICE — UNIVERSAL BLOCK TRAY: Brand: MEDLINE INDUSTRIES, INC.

## (undated) DEVICE — CANNULA,OXY,ADULT,SUPERSOFT,W/7'TUB,SC: Brand: MEDLINE INDUSTRIES, INC.

## (undated) DEVICE — CHLORAPREP 26ML ORANGE

## (undated) DEVICE — TOWEL OR BLUEE 16X26IN ST 8 PACK ORB08 16X26ORTWL